# Patient Record
Sex: FEMALE | Race: WHITE | NOT HISPANIC OR LATINO | Employment: FULL TIME | URBAN - METROPOLITAN AREA
[De-identification: names, ages, dates, MRNs, and addresses within clinical notes are randomized per-mention and may not be internally consistent; named-entity substitution may affect disease eponyms.]

---

## 2019-02-05 ENCOUNTER — TRANSFERRED RECORDS (OUTPATIENT)
Dept: HEALTH INFORMATION MANAGEMENT | Facility: CLINIC | Age: 59
End: 2019-02-05

## 2020-06-03 ENCOUNTER — TRANSFERRED RECORDS (OUTPATIENT)
Dept: HEALTH INFORMATION MANAGEMENT | Facility: CLINIC | Age: 60
End: 2020-06-03

## 2022-05-09 ENCOUNTER — APPOINTMENT (OUTPATIENT)
Dept: OCCUPATIONAL MEDICINE | Facility: OTHER | Age: 62
End: 2022-05-09

## 2022-05-16 ENCOUNTER — HOSPITAL ENCOUNTER (OUTPATIENT)
Dept: GENERAL RADIOLOGY | Facility: HOSPITAL | Age: 62
Discharge: HOME | End: 2022-05-16
Attending: FAMILY MEDICINE | Admitting: FAMILY MEDICINE

## 2022-05-16 ENCOUNTER — APPOINTMENT (OUTPATIENT)
Dept: OCCUPATIONAL MEDICINE | Facility: OTHER | Age: 62
End: 2022-05-16

## 2022-05-16 DIAGNOSIS — R76.12 POSITIVE QUANTIFERON-TB GOLD TEST: ICD-10-CM

## 2022-05-16 PROCEDURE — 999N000029 XR CHEST 1 VIEW, JOB CARE

## 2022-06-22 ENCOUNTER — APPOINTMENT (OUTPATIENT)
Dept: OCCUPATIONAL MEDICINE | Facility: OTHER | Age: 62
End: 2022-06-22

## 2022-07-28 ENCOUNTER — TELEPHONE (OUTPATIENT)
Dept: FAMILY MEDICINE | Facility: OTHER | Age: 62
End: 2022-07-28

## 2022-07-28 NOTE — TELEPHONE ENCOUNTER
Pt is est care with Provider Monty.  Called to request  Med refills.  Writer instructed pt to contact pharmacy to request current orders be sent to [facility] clinic for review & Providers approval to refill  Pt relayed understanding.  No further concerns att the end of this call.

## 2022-08-02 DIAGNOSIS — I10 HTN (HYPERTENSION): ICD-10-CM

## 2022-08-02 DIAGNOSIS — I10 HYPERTENSION, UNSPECIFIED TYPE: ICD-10-CM

## 2022-08-02 DIAGNOSIS — F41.9 ANXIETY: Primary | ICD-10-CM

## 2022-08-02 NOTE — TELEPHONE ENCOUNTER
Will be establishing care with you 10/3/22.  In need of Elavil, Lisinopril, and Norvasc.  Elavil filled 6.30.33 #30, Lisinopril 7.14.22 #30, Norvasc 7.14.22 #30.

## 2022-08-03 RX ORDER — AMLODIPINE BESYLATE 10 MG/1
10 TABLET ORAL DAILY
Qty: 30 TABLET | Refills: 2 | Status: SHIPPED | OUTPATIENT
Start: 2022-08-03 | End: 2022-10-03

## 2022-08-03 RX ORDER — LISINOPRIL 40 MG/1
40 TABLET ORAL DAILY
Qty: 30 TABLET | Refills: 2 | Status: SHIPPED | OUTPATIENT
Start: 2022-08-03 | End: 2022-10-03 | Stop reason: DRUGHIGH

## 2022-08-12 DIAGNOSIS — I10 HYPERTENSION, UNSPECIFIED TYPE: ICD-10-CM

## 2022-08-12 DIAGNOSIS — F41.9 ANXIETY: ICD-10-CM

## 2022-08-16 RX ORDER — LISINOPRIL 40 MG/1
TABLET ORAL
Qty: 30 TABLET | Refills: 0 | OUTPATIENT
Start: 2022-08-16

## 2022-08-16 NOTE — TELEPHONE ENCOUNTER
Pt has establish care with Chandrika October 2022    Pharmacy request for Toprol XL and Hygroton.No on current [facility] med list.    Please advise.    Ann Marie Llanos RN

## 2022-08-17 RX ORDER — CHLORTHALIDONE 25 MG/1
TABLET ORAL
Qty: 15 TABLET | Refills: 0 | Status: SHIPPED | OUTPATIENT
Start: 2022-08-17 | End: 2022-09-16

## 2022-08-17 RX ORDER — METOPROLOL SUCCINATE 100 MG/1
TABLET, EXTENDED RELEASE ORAL
Qty: 30 TABLET | Refills: 0 | Status: SHIPPED | OUTPATIENT
Start: 2022-08-17 | End: 2022-09-16

## 2022-09-14 DIAGNOSIS — I10 HYPERTENSION, UNSPECIFIED TYPE: ICD-10-CM

## 2022-09-16 RX ORDER — CHLORTHALIDONE 25 MG/1
TABLET ORAL
Qty: 15 TABLET | Refills: 0 | Status: SHIPPED | OUTPATIENT
Start: 2022-09-16 | End: 2022-10-03

## 2022-09-16 RX ORDER — METOPROLOL SUCCINATE 100 MG/1
TABLET, EXTENDED RELEASE ORAL
Qty: 30 TABLET | Refills: 0 | Status: SHIPPED | OUTPATIENT
Start: 2022-09-16 | End: 2022-10-03

## 2022-09-16 NOTE — TELEPHONE ENCOUNTER
chlorthalidone      Last Written Prescription Date:  8/17/22  Last Fill Quantity: 15,   # refills: 0  Last Office Visit: 5/20/22  Future Office visit:       Routing refill request to provider for review/approval because:      Metoprolol succinate ER      Last Written Prescription Date:  8/17/22  Last Fill Quantity: 30,   # refills: 0  Last Office Visit: 5/20/22  Future Office visit:       Routing refill request to provider for review/approval because:

## 2022-09-30 NOTE — PROGRESS NOTES
Assessment & Plan   ESTABLISH CARE VISIT    ACCEPT INTO PRACTICE.     Visit for screening mammogram  She is due.    - MA SCREENING DIGITAL BILAT - Future  (s+30); Future    Screen for colon cancer  Due for this.   - Colonoscopy Screening  Referral; Future    Screening for HIV (human immunodeficiency virus)  She is due for this.   - HIV Antigen Antibody Combo; Future    Need for hepatitis C screening test  Never been screened.   - Hepatitis C Screen Reflex to HCV RNA Quant and Genotype; Future    Screening for hyperlipidemia  Due for fasting labs.   - Lipid panel reflex to direct LDL Non-fasting; Future    Hypertension, unspecified type  She is going to take Elavil for sleep and this does help bring her blood pressure down.  She is very diligent about blood pressure control.  DASH dietary changes are followed.  Needing a refill of all medications.  Weight loss discussion.   - amitriptyline (ELAVIL) 25 MG tablet; Take 1 tablet (25 mg) by mouth At Bedtime  - chlorthalidone (HYGROTON) 25 MG tablet; TAKE ONE-HALF TABLET (12.5MG) BY MOUTH DAILY  - metoprolol succinate ER (TOPROL XL) 100 MG 24 hr tablet; TAKE 1 TABLET BY MOUTH DAILY. DO NOT CRUSH OR CHEW.  - amLODIPine (NORVASC) 10 MG tablet; Take 1 tablet (10 mg) by mouth daily  - lisinopril (ZESTRIL) 40 MG tablet; Take 1 tablet (40 mg) by mouth daily  - CBC with platelets and differential; Future  - Basic metabolic panel; Future    Anxiety  Longstanding.     Mixed hyperlipidemia  She is going to be given refill. Needing labs. Has calcium score that needs to be done.  Had some changes on a long CT with arthrosclerosis .  This was done in [place] years ago.  We will recheck and do assessment for risk with other chronic disease.   - atorvastatin (LIPITOR) 20 MG tablet; Take 1 tablet (20 mg) by mouth At Bedtime    Pure hypercholesterolemia    - atorvastatin (LIPITOR) 20 MG tablet; Take 1 tablet (20 mg) by mouth At Bedtime    Primary insomnia  Elavil as well.   -  "hydrOXYzine (ATARAX) 25 MG tablet; Take 1 tablet (25 mg) by mouth 3 times daily as needed for anxiety    Allergic conjunctivitis, bilateral    - fluticasone (FLONASE) 50 MCG/ACT nasal spray; Spray 2 sprays in nostril daily  - olopatadine (PATANOL) 0.1 % ophthalmic solution; Place 1 drop into both eyes 2 times daily    Prediabetes    - metFORMIN (GLUCOPHAGE XR) 500 MG 24 hr tablet; Take 1 tablet (500 mg) by mouth daily (with dinner)    Gout, unspecified cause, unspecified chronicity, unspecified site  Hx of this in her big toe.   - Uric acid; Future      10 minutes spent on the date of the encounter doing chart review, history and exam, documentation and further activities per the note       BMI:   Estimated body mass index is 32.39 kg/m  as calculated from the following:    Height as of this encounter: 1.727 m (5' 8\").    Weight as of this encounter: 96.6 kg (213 lb).       See Patient Instructions    No follow-ups on file.    ISMAEL Van  [facility] - AMANDA Meneses is a 62 year old, presenting for the following health issues:  Establish Care      HPI     Hypertension Follow-up      Do you check your blood pressure regularly outside of the clinic? No     Are you following a low salt diet? Yes    Are your blood pressures ever more than 140 on the top number (systolic) OR more   than 90 on the bottom number (diastolic), for example 140/90? No    Hyperlipidemia Follow-Up      Are you regularly taking any medication or supplement to lower your cholesterol?   Yes- Lipitor    Are you having muscle aches or other side effects that you think could be caused by your cholesterol lowering medication?  No    Anxiety Follow-Up    How are you doing with your anxiety since your last visit? Improved    Are you having other symptoms that might be associated with anxiety? No    Have you had a significant life event? Relationship Concerns -  has HF    Are you feeling depressed? No    Do " "you have any concerns with your use of alcohol or other drugs? No    Social History     Tobacco Use     Smoking status: Former Smoker     Quit date: 1995     Years since quittin.7     Smokeless tobacco: Never Used   Substance Use Topics     Alcohol use: Yes     Comment: ocassion     Drug use: Never     No flowsheet data found.  No flowsheet data found.  No flowsheet data found.  No flowsheet data found.      How many servings of fruits and vegetables do you eat daily?  2-3    On average, how many sweetened beverages do you drink each day (Examples: soda, juice, sweet tea, etc.  Do NOT count diet or artificially sweetened beverages)?   0    How many days per week do you exercise enough to make your heart beat faster? 5    How many minutes a day do you exercise enough to make your heart beat faster? 30 - 60    How many days per week do you miss taking your medication? 0           Review of Systems   CONSTITUTIONAL: NEGATIVE for fever, chills, change in weight  INTEGUMENTARY/SKIN: NEGATIVE for worrisome rashes, moles or lesions  EYES: NEGATIVE for vision changes or irritation  ENT/MOUTH: NEGATIVE for ear, mouth and throat problems  RESP: NEGATIVE for significant cough or SOB  BREAST: NEGATIVE for masses, tenderness or discharge  CV: NEGATIVE for chest pain, palpitations or peripheral edema  GI: NEGATIVE for nausea, abdominal pain, heartburn, or change in bowel habits  : NEGATIVE for frequency, dysuria, or hematuria  MUSCULOSKELETAL: she has too many aches to mention.   NEURO: NEGATIVE for weakness, dizziness or paresthesias  ENDOCRINE: NEGATIVE for temperature intolerance, skin/hair changes  HEME: NEGATIVE for bleeding problems  PSYCHIATRIC: NEGATIVE for changes in mood or affect      Objective    /58 (BP Location: Right arm, Patient Position: Sitting, Cuff Size: Adult Regular)   Pulse 80   Temp 97.7  F (36.5  C) (Tympanic)   Ht 1.727 m (5' 8\")   Wt 96.6 kg (213 lb)   SpO2 96%   BMI 32.39 kg/m  "   Body mass index is 32.39 kg/m .  Physical Exam   GENERAL: healthy, alert and no distress  EYES: Eyes grossly normal to inspection, PERRL and conjunctivae and sclerae normal  HENT: ear canals and TM's normal, nose and mouth without ulcers or lesions  NECK: no adenopathy, no asymmetry, masses, or scars and thyroid normal to palpation  RESP: lungs clear to auscultation - no rales, rhonchi or wheezes  BREAST: normal without masses, tenderness or nipple discharge and no palpable axillary masses or adenopathy  CV: regular rate and rhythm, normal S1 S2, no S3 or S4, no murmur, click or rub, no peripheral edema and peripheral pulses strong  ABDOMEN: soft, nontender, no hepatosplenomegaly, no masses and bowel sounds normal   (female): normal female external genitalia, normal urethral meatus, vaginal mucosa pink, moist, well rugated, and normal cervix/adnexa/uterus without masses or discharge  MS: no gross musculoskeletal defects noted, no edema  SKIN: no suspicious lesions or rashes  NEURO: Normal strength and tone, mentation intact and speech normal  PSYCH: mentation appears normal, affect normal/bright  LYMPH: no cervical, supraclavicular, axillary, or inguinal adenopathy    No results found for any previous visit.

## 2022-10-03 ENCOUNTER — OFFICE VISIT (OUTPATIENT)
Dept: FAMILY MEDICINE | Facility: OTHER | Age: 62
End: 2022-10-03
Attending: PHYSICIAN ASSISTANT
Payer: COMMERCIAL

## 2022-10-03 VITALS
BODY MASS INDEX: 32.28 KG/M2 | HEART RATE: 80 BPM | HEIGHT: 68 IN | SYSTOLIC BLOOD PRESSURE: 102 MMHG | TEMPERATURE: 97.7 F | OXYGEN SATURATION: 96 % | WEIGHT: 213 LBS | DIASTOLIC BLOOD PRESSURE: 58 MMHG

## 2022-10-03 DIAGNOSIS — Z11.59 NEED FOR HEPATITIS C SCREENING TEST: ICD-10-CM

## 2022-10-03 DIAGNOSIS — E78.2 MIXED HYPERLIPIDEMIA: ICD-10-CM

## 2022-10-03 DIAGNOSIS — Z22.7 LATENT TUBERCULOSIS: ICD-10-CM

## 2022-10-03 DIAGNOSIS — H10.13 ALLERGIC CONJUNCTIVITIS, BILATERAL: ICD-10-CM

## 2022-10-03 DIAGNOSIS — M10.9 GOUT, UNSPECIFIED CAUSE, UNSPECIFIED CHRONICITY, UNSPECIFIED SITE: ICD-10-CM

## 2022-10-03 DIAGNOSIS — Z11.4 SCREENING FOR HIV (HUMAN IMMUNODEFICIENCY VIRUS): ICD-10-CM

## 2022-10-03 DIAGNOSIS — E78.00 PURE HYPERCHOLESTEROLEMIA: ICD-10-CM

## 2022-10-03 DIAGNOSIS — Z76.89 ESTABLISHING CARE WITH NEW DOCTOR, ENCOUNTER FOR: ICD-10-CM

## 2022-10-03 DIAGNOSIS — Z13.220 SCREENING FOR HYPERLIPIDEMIA: ICD-10-CM

## 2022-10-03 DIAGNOSIS — I10 HYPERTENSION, UNSPECIFIED TYPE: ICD-10-CM

## 2022-10-03 DIAGNOSIS — F51.01 PRIMARY INSOMNIA: ICD-10-CM

## 2022-10-03 DIAGNOSIS — Z12.31 VISIT FOR SCREENING MAMMOGRAM: ICD-10-CM

## 2022-10-03 DIAGNOSIS — F41.9 ANXIETY: Primary | ICD-10-CM

## 2022-10-03 DIAGNOSIS — R73.03 PREDIABETES: ICD-10-CM

## 2022-10-03 DIAGNOSIS — Z12.11 SCREEN FOR COLON CANCER: ICD-10-CM

## 2022-10-03 PROBLEM — G89.4: Status: ACTIVE | Noted: 2021-05-21

## 2022-10-03 PROBLEM — K58.9 IRRITABLE BOWEL SYNDROME: Status: ACTIVE | Noted: 2020-12-11

## 2022-10-03 PROBLEM — D86.0 PULMONARY SARCOIDOSIS (H): Status: ACTIVE | Noted: 2019-12-23

## 2022-10-03 PROBLEM — N60.19 DIFFUSE CYSTIC MASTOPATHY: Status: ACTIVE | Noted: 2020-12-11

## 2022-10-03 PROBLEM — D86.1 MEDIASTINAL LYMPHADENOPATHY DUE TO SARCOIDOSIS: Status: ACTIVE | Noted: 2019-11-05

## 2022-10-03 PROBLEM — M79.18: Status: ACTIVE | Noted: 2021-05-21

## 2022-10-03 PROBLEM — M54.12 CERVICAL RADICULOPATHY: Status: ACTIVE | Noted: 2021-03-08

## 2022-10-03 LAB
ALBUMIN SERPL-MCNC: 4.1 G/DL (ref 3.4–5)
ALP SERPL-CCNC: 82 U/L (ref 40–150)
ALT SERPL W P-5'-P-CCNC: 49 U/L (ref 0–50)
ANION GAP SERPL CALCULATED.3IONS-SCNC: 5 MMOL/L (ref 3–14)
AST SERPL W P-5'-P-CCNC: 32 U/L (ref 0–45)
BASOPHILS # BLD AUTO: 0.1 10E3/UL (ref 0–0.2)
BASOPHILS NFR BLD AUTO: 1 %
BILIRUB SERPL-MCNC: 0.4 MG/DL (ref 0.2–1.3)
BUN SERPL-MCNC: 21 MG/DL (ref 7–30)
CALCIUM SERPL-MCNC: 9.1 MG/DL (ref 8.5–10.1)
CHLORIDE BLD-SCNC: 102 MMOL/L (ref 94–109)
CO2 SERPL-SCNC: 30 MMOL/L (ref 20–32)
CREAT SERPL-MCNC: 0.98 MG/DL (ref 0.52–1.04)
EOSINOPHIL # BLD AUTO: 0.2 10E3/UL (ref 0–0.7)
EOSINOPHIL NFR BLD AUTO: 4 %
ERYTHROCYTE [DISTWIDTH] IN BLOOD BY AUTOMATED COUNT: 12.4 % (ref 10–15)
GFR SERPL CREATININE-BSD FRML MDRD: 65 ML/MIN/1.73M2
GLUCOSE BLD-MCNC: 108 MG/DL (ref 70–99)
HCT VFR BLD AUTO: 41.1 % (ref 35–47)
HGB BLD-MCNC: 14 G/DL (ref 11.7–15.7)
IMM GRANULOCYTES # BLD: 0 10E3/UL
IMM GRANULOCYTES NFR BLD: 0 %
LYMPHOCYTES # BLD AUTO: 2 10E3/UL (ref 0.8–5.3)
LYMPHOCYTES NFR BLD AUTO: 29 %
MCH RBC QN AUTO: 31.3 PG (ref 26.5–33)
MCHC RBC AUTO-ENTMCNC: 34.1 G/DL (ref 31.5–36.5)
MCV RBC AUTO: 92 FL (ref 78–100)
MONOCYTES # BLD AUTO: 0.6 10E3/UL (ref 0–1.3)
MONOCYTES NFR BLD AUTO: 9 %
NEUTROPHILS # BLD AUTO: 4 10E3/UL (ref 1.6–8.3)
NEUTROPHILS NFR BLD AUTO: 57 %
NRBC # BLD AUTO: 0 10E3/UL
NRBC BLD AUTO-RTO: 0 /100
PLATELET # BLD AUTO: 253 10E3/UL (ref 150–450)
POTASSIUM BLD-SCNC: 3.7 MMOL/L (ref 3.4–5.3)
PROT SERPL-MCNC: 8 G/DL (ref 6.8–8.8)
RBC # BLD AUTO: 4.47 10E6/UL (ref 3.8–5.2)
SODIUM SERPL-SCNC: 137 MMOL/L (ref 133–144)
URATE SERPL-MCNC: 8.3 MG/DL (ref 2.6–6)
WBC # BLD AUTO: 6.9 10E3/UL (ref 4–11)

## 2022-10-03 PROCEDURE — 87389 HIV-1 AG W/HIV-1&-2 AB AG IA: CPT | Performed by: PHYSICIAN ASSISTANT

## 2022-10-03 PROCEDURE — 85025 COMPLETE CBC W/AUTO DIFF WBC: CPT | Performed by: PHYSICIAN ASSISTANT

## 2022-10-03 PROCEDURE — 84550 ASSAY OF BLOOD/URIC ACID: CPT | Performed by: PHYSICIAN ASSISTANT

## 2022-10-03 PROCEDURE — 80053 COMPREHEN METABOLIC PANEL: CPT | Performed by: PHYSICIAN ASSISTANT

## 2022-10-03 PROCEDURE — 86803 HEPATITIS C AB TEST: CPT | Performed by: PHYSICIAN ASSISTANT

## 2022-10-03 PROCEDURE — 90472 IMMUNIZATION ADMIN EACH ADD: CPT | Performed by: PHYSICIAN ASSISTANT

## 2022-10-03 PROCEDURE — 36415 COLL VENOUS BLD VENIPUNCTURE: CPT | Performed by: PHYSICIAN ASSISTANT

## 2022-10-03 PROCEDURE — 90471 IMMUNIZATION ADMIN: CPT | Performed by: PHYSICIAN ASSISTANT

## 2022-10-03 PROCEDURE — 80061 LIPID PANEL: CPT | Performed by: PHYSICIAN ASSISTANT

## 2022-10-03 PROCEDURE — 90682 RIV4 VACC RECOMBINANT DNA IM: CPT | Performed by: PHYSICIAN ASSISTANT

## 2022-10-03 PROCEDURE — 90750 HZV VACC RECOMBINANT IM: CPT | Performed by: PHYSICIAN ASSISTANT

## 2022-10-03 PROCEDURE — 99204 OFFICE O/P NEW MOD 45 MIN: CPT | Mod: 25 | Performed by: PHYSICIAN ASSISTANT

## 2022-10-03 RX ORDER — OLOPATADINE HYDROCHLORIDE 1 MG/ML
1 SOLUTION/ DROPS OPHTHALMIC 2 TIMES DAILY
Qty: 5 ML | Refills: 1 | Status: SHIPPED | OUTPATIENT
Start: 2022-10-03 | End: 2023-08-28

## 2022-10-03 RX ORDER — METFORMIN HCL 500 MG
500 TABLET, EXTENDED RELEASE 24 HR ORAL
Qty: 30 TABLET | Refills: 3 | Status: SHIPPED | OUTPATIENT
Start: 2022-10-03 | End: 2022-11-10

## 2022-10-03 RX ORDER — CETIRIZINE HYDROCHLORIDE 10 MG/1
1 CAPSULE, LIQUID FILLED ORAL DAILY
COMMUNITY

## 2022-10-03 RX ORDER — CHLORTHALIDONE 25 MG/1
TABLET ORAL
Qty: 15 TABLET | Refills: 0 | Status: SHIPPED | OUTPATIENT
Start: 2022-10-03 | End: 2022-11-10

## 2022-10-03 RX ORDER — ATORVASTATIN CALCIUM 20 MG/1
20 TABLET, FILM COATED ORAL AT BEDTIME
Qty: 90 TABLET | Refills: 1 | Status: SHIPPED | OUTPATIENT
Start: 2022-10-03 | End: 2022-11-10

## 2022-10-03 RX ORDER — METOPROLOL SUCCINATE 100 MG/1
TABLET, EXTENDED RELEASE ORAL
Qty: 30 TABLET | Refills: 0 | Status: SHIPPED | OUTPATIENT
Start: 2022-10-03 | End: 2022-11-10

## 2022-10-03 RX ORDER — ATORVASTATIN CALCIUM 20 MG/1
1 TABLET, FILM COATED ORAL AT BEDTIME
COMMUNITY
Start: 2022-06-01 | End: 2022-10-03

## 2022-10-03 RX ORDER — HYDROXYZINE HYDROCHLORIDE 25 MG/1
25 TABLET, FILM COATED ORAL 3 TIMES DAILY PRN
COMMUNITY
End: 2022-10-03

## 2022-10-03 RX ORDER — OLOPATADINE HYDROCHLORIDE 1 MG/ML
1 SOLUTION/ DROPS OPHTHALMIC 2 TIMES DAILY
COMMUNITY
End: 2022-10-03

## 2022-10-03 RX ORDER — AMLODIPINE BESYLATE 10 MG/1
10 TABLET ORAL DAILY
Qty: 30 TABLET | Refills: 2 | Status: SHIPPED | OUTPATIENT
Start: 2022-10-03 | End: 2022-11-10

## 2022-10-03 RX ORDER — FLUTICASONE PROPIONATE 50 MCG
2 SPRAY, SUSPENSION (ML) NASAL
COMMUNITY
Start: 2022-05-20 | End: 2022-10-03

## 2022-10-03 RX ORDER — LISINOPRIL 40 MG/1
40 TABLET ORAL
COMMUNITY
Start: 2022-08-03 | End: 2022-10-03

## 2022-10-03 RX ORDER — LISINOPRIL 40 MG/1
40 TABLET ORAL DAILY
Qty: 90 TABLET | Refills: 1 | Status: SHIPPED | OUTPATIENT
Start: 2022-10-03 | End: 2022-11-10

## 2022-10-03 RX ORDER — HYDROXYZINE HYDROCHLORIDE 25 MG/1
25 TABLET, FILM COATED ORAL 3 TIMES DAILY PRN
Qty: 30 TABLET | Refills: 3 | Status: SHIPPED | OUTPATIENT
Start: 2022-10-03 | End: 2022-11-10

## 2022-10-03 RX ORDER — FLUTICASONE PROPIONATE 50 MCG
2 SPRAY, SUSPENSION (ML) NASAL DAILY
Qty: 18.2 ML | Refills: 11 | Status: SHIPPED | OUTPATIENT
Start: 2022-10-03 | End: 2023-08-28

## 2022-10-03 ASSESSMENT — PAIN SCALES - GENERAL: PAINLEVEL: NO PAIN (0)

## 2022-10-03 NOTE — PATIENT INSTRUCTIONS
Thank you for choosing [facility].   I have office hours 8:00 am to 4:30 pm on Monday's, Wednesday's, Thursday's and Friday's. My nurse and I are out of the office every Tuesday.    Following your visit, when your labs and diagnostic testing have returned, I will review then and you will be contacted by my nurse.  If you are on My Chart, you can also view results there.    For refills, notify your pharmacy regarding what you need and the pharmacy will generate a refill request. Do not call my nurse as she is unable to process refill request. Please plan ahead and allow 3-5 days for refill requests.    You will generally receive a reminder call the day prior to your appointment.  If you cannot attend your appointment, please cancel your appointment with as much notice as possible.  If there is a pattern of failure to present for your appointments, I cannot provide consistent, meaningful, ongoing care for you. It is very important to me that you come in for your care, so we can best assist you with your health care needs.    IMPORTANT:  Please note that it is my standard of practice to NOT participate in prescribing ongoing requested Narcotic Analgesic therapy, and/or participate in the prescribing of other controlled substances.  My nurse and I am happy to assist you with the process of referral for alternative pain management as needed, and other treatment modalities including but not limited to:  Physical Therapy, Physical Medicine and Rehab, Counseling, Chiropractic Care, Orthopedic Care, and non-narcotic medication management.     In the event that you need to be seen for emergent concerns and I am out of office,  please see one of my colleagues for acute concerns.  You may also present to  or ER.  I appreciate the opportunity to serve you and look forward to supporting your healthcare needs in the future. Please contact me with any questions or concerns that you may  have.    Sincerely,      Yecenia Millan RN, PA-C

## 2022-10-03 NOTE — NURSING NOTE
"Chief Complaint   Patient presents with     Establish Care       Initial /58 (BP Location: Right arm, Patient Position: Sitting, Cuff Size: Adult Regular)   Pulse 80   Temp 97.7  F (36.5  C) (Tympanic)   Ht 1.727 m (5' 8\")   Wt 96.6 kg (213 lb)   SpO2 96%   BMI 32.39 kg/m   Estimated body mass index is 32.39 kg/m  as calculated from the following:    Height as of this encounter: 1.727 m (5' 8\").    Weight as of this encounter: 96.6 kg (213 lb).  Medication Reconciliation: complete  Lorie Maloney LPN  "

## 2022-10-04 LAB
CHOLEST SERPL-MCNC: 189 MG/DL
HDLC SERPL-MCNC: 52 MG/DL
LDLC SERPL CALC-MCNC: 86 MG/DL
NONHDLC SERPL-MCNC: 137 MG/DL
TRIGL SERPL-MCNC: 257 MG/DL

## 2022-10-05 ENCOUNTER — E-CONSULT (OUTPATIENT)
Dept: INFECTIOUS DISEASES | Facility: CLINIC | Age: 62
End: 2022-10-05
Payer: COMMERCIAL

## 2022-10-05 DIAGNOSIS — Z22.7 LATENT TUBERCULOSIS INFECTION: Primary | ICD-10-CM

## 2022-10-05 LAB
HCV AB SERPL QL IA: NONREACTIVE
HIV 1+2 AB+HIV1 P24 AG SERPL QL IA: NONREACTIVE

## 2022-10-05 PROCEDURE — 99451 NTRPROF PH1/NTRNET/EHR 5/>: CPT | Performed by: INTERNAL MEDICINE

## 2022-10-05 NOTE — RESULT ENCOUNTER NOTE
LDL and HDL are good but the TGL are higher than we like.   Could be a glass of wine the night before. Ok uric acid is too high will be hard on your kidneys. Can start taking Uloric or Allopurinol.  I do not see this on your list of medications.  If you have this and currently not taking let me know.    And your glucose is prediabetic. So start on the Metformin.

## 2022-10-06 NOTE — PROGRESS NOTES
10/5/2022     E-Consult has been accepted.    Interprofessional consultation requested by:  Yecenia Millan PA      Clinical Question/Purpose:  If treatment of latent TB is needed?     Patient assessment and information reviewed:   There us a note that she had positive TB quantiferon 5/12/22 but I can't find the result. 5/16/22 CXR clear. PMH includes HTN, anxiety, mixed hyperlipidemia, prediabetes and gout.     Recommendations:   The decision to treat latent TB involves risk/benefit conversations with the patient regarding the medication side effect and risk for reactivation.  If latent TB is untreated, approximately 5%-10% of persons with LTBI progress to tuberculosis (TB) disease during their lifetime.   This is a calculator to determine % chance of reactivation. Https://www.txrbo2p.[employer]/en/calc.html.   This takes into account where she was born, BCG status, known contact, health history. Based on my review her risk of reactivation is relatively low. This would need to be considered when comparing it to the risk of side effects of the medication.   Immunosuppression would increase the risk of reactivation. If there are plans for increased immunosuppression for any reason then you would want to treat.  Asking your patient why she was tested (? Occupational test) and her risk factors for TB is important to determine the validity of the test. If there are no risk factors you may want to consider repeating the TB quantiferon. I can't find the result so you may want to reconsider checking it for this reason as well if you don't have access to result. Another consideration is if there is ongoing travel to countries with high rates of TB the decision to not treat may be based on the risk of re-exposure.  Higher risk factors for reactivation include HIV, transplant, lymphoma, renal failure, TNF alpha blockers; intermediate risk factors are diabetes, prolonged steroids; lower risk smoking tobacco, granuloma on CXR,  and individuals born in countries with high incidence of of TB. HIV negative.  If the decision is made to treat after conversation with the patient then the optimal regimen options include (in order):   1. Weekly Rifapentine 900 mg (>50 kg) + weekly Isoniazid (15 mg/kg, max 900 mg) + pyridoxine 50 mg daily. Duration is 12 weeks. Please check for drug drug interactions.  2. Rifampin daily (10 mg/kg, max 600 mg) x 4 months   3. If further alternate regimens are considered then may want to consider referral to ID.     You may find this reference helpful when discussing treatment side effects. Https://www.cdc.gov/tb/publications/ltbi/pdf/QNZUzcjsxsr830.pdf.    LFTs are normal at baseline but CBC with diff and CMP should be checked monthly while on treatment.     The recommendations provided in this E-Consult are based on a review of clinical data pertinent to the clinical question presented, without a review of the patient's complete medical record or, the benefit of a comprehensive in-person or virtual patient evaluation. This consultation should not replace the clinical judgement and evaluation of the provider ordering this E-Consult. Any new clinical issues, or changes in patient status since the filing of this E-Consult will need to be taken into account when assessing these recommendations. Please contact me if you have further questions.    My total time spent reviewing clinical information and formulating assessment was 20 minutes.        Dionna Dhillon DO

## 2022-10-28 ENCOUNTER — HOSPITAL ENCOUNTER (OUTPATIENT)
Facility: HOSPITAL | Age: 62
End: 2022-10-28
Attending: SURGERY | Admitting: SURGERY
Payer: COMMERCIAL

## 2022-10-28 ENCOUNTER — VIRTUAL VISIT (OUTPATIENT)
Dept: SURGERY | Facility: OTHER | Age: 62
End: 2022-10-28
Attending: PHYSICIAN ASSISTANT
Payer: COMMERCIAL

## 2022-10-28 ENCOUNTER — TELEPHONE (OUTPATIENT)
Dept: SURGERY | Facility: OTHER | Age: 62
End: 2022-10-28

## 2022-10-28 ENCOUNTER — PREP FOR PROCEDURE (OUTPATIENT)
Dept: SURGERY | Facility: OTHER | Age: 62
End: 2022-10-28

## 2022-10-28 DIAGNOSIS — Z12.11 SCREENING FOR COLON CANCER: Primary | ICD-10-CM

## 2022-10-28 DIAGNOSIS — Z12.11 SCREEN FOR COLON CANCER: ICD-10-CM

## 2022-10-28 RX ORDER — BISACODYL 5 MG/1
TABLET, DELAYED RELEASE ORAL
Qty: 4 TABLET | Refills: 0 | Status: SHIPPED | OUTPATIENT
Start: 2022-10-28 | End: 2022-12-14

## 2022-10-28 NOTE — PATIENT INSTRUCTIONS
Thank you for allowing our surgical team to participate in your care. Please call our health unit coordinator at [phone] with scheduling questions or the nurse at [phone] with any other questions or concerns.        You have been scheduled for Colonoscopy with  on 12/16/22.   You will use [facility] bowel prep.  Please see handout for additional instruction.  You don't need a pre-operative appointment with your primary care provider.  You may call [phone] or [phone] with any questions.     COVID-19 test is needed prior to procedure, even if you've been vaccinated.   If you are going home on the same day as your procedure (surgery):    1-2 days before your procedure, take an at-home, rapid antigen test. You can buy these at many stores. If you can't find an at-home antigen test, please schedule a PCR test with [facility] by calling [phone], or visiting [email or web address]. We can't accept tests that are more than 4 days old.    If your test is NEGATIVE, please take a photo of the test. Show the photo to the nurse when you come in for your procedure (surgery)    If your test is POSITIVE, please contact the pre-Admission office at [phone]. If you are calling after 5 PM on weekdays, and on the weekend, please call [phone] and ask to speak with the House Supervisor.   If you are staying overnight in the hospital you will need to get a PCR covid test 2-4 days prior to procedure (surgery).

## 2022-10-28 NOTE — PROGRESS NOTES
Referral received for colonoscopy for screening for colon cancer.   This patient was approved by Angeli, surgery education RN for meet and miguelinaet colonoscopy without preop appointment.   Patient scheduled for colonoscopy on 12/16/22 with Dr. Lr at [facility]   Instructions given via phone and sent to patient via mail, upper registration desk.   COVID-19 test: at home  Preop: not needed  Sangeetha Gold LPN

## 2022-11-09 ENCOUNTER — HOSPITAL ENCOUNTER (OUTPATIENT)
Dept: CT IMAGING | Facility: HOSPITAL | Age: 62
Discharge: HOME | End: 2022-11-09
Attending: PHYSICIAN ASSISTANT
Payer: COMMERCIAL

## 2022-11-09 ENCOUNTER — TELEPHONE (OUTPATIENT)
Dept: MAMMOGRAPHY | Facility: OTHER | Age: 62
End: 2022-11-09

## 2022-11-09 ENCOUNTER — ANCILLARY PROCEDURE (OUTPATIENT)
Dept: MAMMOGRAPHY | Facility: OTHER | Age: 62
End: 2022-11-09
Attending: PHYSICIAN ASSISTANT
Payer: COMMERCIAL

## 2022-11-09 DIAGNOSIS — Z12.31 VISIT FOR SCREENING MAMMOGRAM: ICD-10-CM

## 2022-11-09 DIAGNOSIS — E78.2 MIXED HYPERLIPIDEMIA: ICD-10-CM

## 2022-11-09 PROCEDURE — 75571 CT HRT W/O DYE W/CA TEST: CPT

## 2022-11-09 PROCEDURE — 77063 BREAST TOMOSYNTHESIS BI: CPT | Mod: TC | Performed by: RADIOLOGY

## 2022-11-09 PROCEDURE — 77067 SCR MAMMO BI INCL CAD: CPT | Mod: TC | Performed by: RADIOLOGY

## 2022-11-09 NOTE — TELEPHONE ENCOUNTER
Attempted to call with results, straight to voicemail and unable to leave voicemail at this time. RC

## 2022-11-09 NOTE — RESULT ENCOUNTER NOTE
Aggressive weight , cholesterol and glucose and blood pressure management due to moderate risk on her coronary calcium score.   She has an appointment in December.  Recommend she look up sources to help her with her improved exercise and diet.   Return to Work letter prepped per Dr. Trejo's verbal request, approved by Dr. Trejo, and given to patient.

## 2022-11-10 DIAGNOSIS — R73.03 PREDIABETES: ICD-10-CM

## 2022-11-10 DIAGNOSIS — E78.2 MIXED HYPERLIPIDEMIA: ICD-10-CM

## 2022-11-10 DIAGNOSIS — I10 HYPERTENSION, UNSPECIFIED TYPE: ICD-10-CM

## 2022-11-10 DIAGNOSIS — F51.01 PRIMARY INSOMNIA: ICD-10-CM

## 2022-11-10 DIAGNOSIS — E78.00 PURE HYPERCHOLESTEROLEMIA: ICD-10-CM

## 2022-11-10 RX ORDER — METOPROLOL SUCCINATE 100 MG/1
TABLET, EXTENDED RELEASE ORAL
Qty: 30 TABLET | Refills: 0 | Status: SHIPPED | OUTPATIENT
Start: 2022-11-10 | End: 2022-12-08

## 2022-11-10 RX ORDER — CHLORTHALIDONE 25 MG/1
TABLET ORAL
Qty: 15 TABLET | Refills: 0 | Status: SHIPPED | OUTPATIENT
Start: 2022-11-10 | End: 2022-12-08

## 2022-11-10 RX ORDER — HYDROXYZINE HYDROCHLORIDE 25 MG/1
25 TABLET, FILM COATED ORAL 3 TIMES DAILY PRN
Qty: 30 TABLET | Refills: 3 | Status: SHIPPED | OUTPATIENT
Start: 2022-11-10 | End: 2023-12-22

## 2022-11-10 RX ORDER — ATORVASTATIN CALCIUM 20 MG/1
20 TABLET, FILM COATED ORAL AT BEDTIME
Qty: 90 TABLET | Refills: 1 | Status: SHIPPED | OUTPATIENT
Start: 2022-11-10 | End: 2023-02-01

## 2022-11-10 RX ORDER — AMLODIPINE BESYLATE 10 MG/1
10 TABLET ORAL DAILY
Qty: 30 TABLET | Refills: 2 | Status: SHIPPED | OUTPATIENT
Start: 2022-11-10 | End: 2023-03-01

## 2022-11-10 RX ORDER — LISINOPRIL 40 MG/1
40 TABLET ORAL DAILY
Qty: 90 TABLET | Refills: 1 | Status: SHIPPED | OUTPATIENT
Start: 2022-11-10 | End: 2023-08-28

## 2022-11-10 RX ORDER — METFORMIN HCL 500 MG
500 TABLET, EXTENDED RELEASE 24 HR ORAL
Qty: 30 TABLET | Refills: 3 | Status: SHIPPED | OUTPATIENT
Start: 2022-11-10 | End: 2022-12-14

## 2022-11-18 ENCOUNTER — APPOINTMENT (OUTPATIENT)
Dept: OCCUPATIONAL MEDICINE | Facility: OTHER | Age: 62
End: 2022-11-18

## 2022-11-28 ENCOUNTER — OFFICE VISIT (OUTPATIENT)
Dept: FAMILY MEDICINE | Facility: OTHER | Age: 62
End: 2022-11-28
Attending: NURSE PRACTITIONER
Payer: COMMERCIAL

## 2022-11-28 VITALS
DIASTOLIC BLOOD PRESSURE: 70 MMHG | TEMPERATURE: 98.3 F | HEART RATE: 94 BPM | OXYGEN SATURATION: 97 % | WEIGHT: 210 LBS | SYSTOLIC BLOOD PRESSURE: 120 MMHG | BODY MASS INDEX: 31.93 KG/M2

## 2022-11-28 DIAGNOSIS — J06.9 VIRAL URI WITH COUGH: Primary | ICD-10-CM

## 2022-11-28 LAB
FLUAV RNA SPEC QL NAA+PROBE: NEGATIVE
FLUBV RNA RESP QL NAA+PROBE: NEGATIVE
GROUP A STREP BY PCR: NOT DETECTED
RSV RNA SPEC NAA+PROBE: NEGATIVE
SARS-COV-2 RNA RESP QL NAA+PROBE: NEGATIVE

## 2022-11-28 PROCEDURE — 87637 SARSCOV2&INF A&B&RSV AMP PRB: CPT | Performed by: NURSE PRACTITIONER

## 2022-11-28 PROCEDURE — 99213 OFFICE O/P EST LOW 20 MIN: CPT | Performed by: NURSE PRACTITIONER

## 2022-11-28 PROCEDURE — 87651 STREP A DNA AMP PROBE: CPT | Performed by: NURSE PRACTITIONER

## 2022-11-28 ASSESSMENT — PAIN SCALES - GENERAL: PAINLEVEL: MODERATE PAIN (5)

## 2022-11-28 NOTE — PROGRESS NOTES
Assessment & Plan     (J06.9) Viral URI with cough  (primary encounter diagnosis)  Comment: She has had viral URI symptoms for 10 days. Likely lingering viral illness, but will test for strep, influenza, and Covid and call patient with results. She was advised to rest, hydrate, and call office if any symptoms worsen, new fever, or with any difficulty staying hydrated.   Plan: Group A Streptococcus PCR Throat Swab ([place]         ONLY), Symptomatic; Unknown Influenza A/B &         SARS-CoV2 (COVID-19) Virus PCR Multiplex          Negative Strep   COVID/influenza and RSV pending    Ordering of each unique test         See Patient Instructions    No follow-ups on file.    JAMIE Granados  [facility]      I was present with the nurse practitioner student who participated in the service and in the documentation of the note. I have verified the history and personally performed the physical exam and medical decision making. I agree with the assessment and plan of care as documented in the note.     Evy CARLIN [facility]  Family Nurse Practitioner          Saritha Meneses is a 62 year old accompanied by her self, presenting for the following health issues:  Cough      HPI     Acute Illness  Acute illness concerns: cough sore throat  Onset/Duration: sore throat for over 10 days cough for couple days  Symptoms:  Fever: No  Chills/Sweats: YES  Headache (location?): YES  Sinus Pressure: No  Conjunctivitis:  No  Ear Pain: YES: Right ear   Rhinorrhea: No  Congestion: No  Sore Throat: YES  Cough: YES-non-productive  Wheeze: No  Decreased Appetite: yes  Nausea: No  Vomiting: No  Diarrhea: No  Dysuria/Freq.: No  Dysuria or Hematuria: No  Fatigue/Achiness: No  Sick/Strep Exposure: No  Therapies tried and outcome: None, ibuprofen this morning. No cough or flu medications.       Review of Systems   CONSTITUTIONAL:Hot and cold feeling. Not sleeping well due to symptoms  ENT/MOUTH: Sore throat for  10 days. Pain in right ear. No congestion, post nasal drip or difficulty swallowing.   RESP:NEGATIVE for hemoptysis, SOB/dyspnea and wheezing and POSITIVE for  cough-non productive  CV: NEGATIVE for chest pain, palpitations or peripheral edema  GI: POSITIVE for 2-3 days of abdominal pain generalized and poor appetite and diarrhea and NEGATIVE for nausea and vomiting      Objective    /70   Pulse 94   Temp 98.3  F (36.8  C) (Tympanic)   Wt 95.3 kg (210 lb)   SpO2 97%   BMI 31.93 kg/m    Body mass index is 31.93 kg/m .  Physical Exam   GENERAL: alert and no distress, appears tired.   EYES: Eyes grossly normal to inspection, PERRL and conjunctivae and sclerae normal  HENT: ear canals and TM's normal, nose and mouth without ulcers or lesions. Tonsils have erythema and swelling bilaterally.   NECK: no adenopathy, no asymmetry, masses, or scars.  RESP: lungs clear to auscultation - no rales, rhonchi or wheezes  CV: regular rate and rhythm, normal S1 S2, no S3 or S4, no murmur, click or rub  ABDOMEN: soft, nontender, no hepatosplenomegaly, no masses and bowel sounds normal  SKIN: no suspicious lesions or rashes    Results for orders placed or performed in visit on 11/28/22   Group A Streptococcus PCR Throat Swab ([place] ONLY)     Status: Normal    Specimen: Throat; Swab   Result Value Ref Range    Group A strep by PCR Not Detected Not Detected    Narrative    The Xpert Xpress Strep A test, performed on the [employer]ument Systems, is a rapid, qualitative in vitro diagnostic test for the detection of Streptococcus pyogenes (Group A ß-hemolytic Streptococcus, Strep A) in throat swab specimens from patients with signs and symptoms of pharyngitis. The Xpert Xpress Strep A test can be used as an aid in the diagnosis of Group A Streptococcal pharyngitis. The assay is not intended to monitor treatment for Group A Streptococcus infections. The Xpert Xpress Strep A test utilizes an automated real-time polymerase  chain reaction (PCR) to detect Streptococcus pyogenes DNA.

## 2022-11-29 ENCOUNTER — TELEPHONE (OUTPATIENT)
Dept: FAMILY MEDICINE | Facility: OTHER | Age: 62
End: 2022-11-29

## 2022-11-29 NOTE — TELEPHONE ENCOUNTER
----- Message from TESFAYE Amos CNP sent at 11/29/2022  7:05 AM CST -----  Negative for strep, influenza, RSV and COVID

## 2022-12-08 ENCOUNTER — MYC REFILL (OUTPATIENT)
Dept: FAMILY MEDICINE | Facility: OTHER | Age: 62
End: 2022-12-08

## 2022-12-08 DIAGNOSIS — I10 HYPERTENSION, UNSPECIFIED TYPE: ICD-10-CM

## 2022-12-08 RX ORDER — SODIUM CHLORIDE, SODIUM LACTATE, POTASSIUM CHLORIDE, CALCIUM CHLORIDE 600; 310; 30; 20 MG/100ML; MG/100ML; MG/100ML; MG/100ML
INJECTION, SOLUTION INTRAVENOUS CONTINUOUS
Status: CANCELLED | OUTPATIENT
Start: 2022-12-08

## 2022-12-08 RX ORDER — METOPROLOL SUCCINATE 100 MG/1
TABLET, EXTENDED RELEASE ORAL
Qty: 30 TABLET | Refills: 3 | Status: SHIPPED | OUTPATIENT
Start: 2022-12-08 | End: 2023-02-10

## 2022-12-08 RX ORDER — MEPERIDINE HYDROCHLORIDE 25 MG/ML
12.5 INJECTION INTRAMUSCULAR; INTRAVENOUS; SUBCUTANEOUS
Status: CANCELLED | OUTPATIENT
Start: 2022-12-08

## 2022-12-08 RX ORDER — CHLORTHALIDONE 25 MG/1
TABLET ORAL
Qty: 15 TABLET | Refills: 0 | Status: SHIPPED | OUTPATIENT
Start: 2022-12-08 | End: 2023-02-10

## 2022-12-08 RX ORDER — HYDRALAZINE HYDROCHLORIDE 20 MG/ML
5-10 INJECTION INTRAMUSCULAR; INTRAVENOUS EVERY 10 MIN PRN
Status: CANCELLED | OUTPATIENT
Start: 2022-12-08

## 2022-12-08 RX ORDER — FENTANYL CITRATE 50 UG/ML
25 INJECTION, SOLUTION INTRAMUSCULAR; INTRAVENOUS
Status: CANCELLED | OUTPATIENT
Start: 2022-12-08

## 2022-12-08 RX ORDER — CHLORTHALIDONE 25 MG/1
TABLET ORAL
Qty: 15 TABLET | Refills: 0 | Status: SHIPPED | OUTPATIENT
Start: 2022-12-08 | End: 2022-12-14

## 2022-12-08 RX ORDER — ONDANSETRON 4 MG/1
4 TABLET, ORALLY DISINTEGRATING ORAL EVERY 30 MIN PRN
Status: CANCELLED | OUTPATIENT
Start: 2022-12-08

## 2022-12-08 RX ORDER — HYDROMORPHONE HYDROCHLORIDE 1 MG/ML
0.2 INJECTION, SOLUTION INTRAMUSCULAR; INTRAVENOUS; SUBCUTANEOUS EVERY 5 MIN PRN
Status: CANCELLED | OUTPATIENT
Start: 2022-12-08

## 2022-12-08 RX ORDER — FENTANYL CITRATE 50 UG/ML
50 INJECTION, SOLUTION INTRAMUSCULAR; INTRAVENOUS EVERY 5 MIN PRN
Status: CANCELLED | OUTPATIENT
Start: 2022-12-08

## 2022-12-08 RX ORDER — ONDANSETRON 2 MG/ML
4 INJECTION INTRAMUSCULAR; INTRAVENOUS EVERY 30 MIN PRN
Status: CANCELLED | OUTPATIENT
Start: 2022-12-08

## 2022-12-08 RX ORDER — LIDOCAINE 40 MG/G
CREAM TOPICAL
Status: CANCELLED | OUTPATIENT
Start: 2022-12-08

## 2022-12-08 RX ORDER — METOPROLOL SUCCINATE 100 MG/1
TABLET, EXTENDED RELEASE ORAL
Qty: 30 TABLET | Refills: 0 | Status: SHIPPED | OUTPATIENT
Start: 2022-12-08 | End: 2022-12-14

## 2022-12-08 RX ORDER — FENTANYL CITRATE 50 UG/ML
25 INJECTION, SOLUTION INTRAMUSCULAR; INTRAVENOUS EVERY 5 MIN PRN
Status: CANCELLED | OUTPATIENT
Start: 2022-12-08

## 2022-12-08 RX ORDER — HYDROMORPHONE HYDROCHLORIDE 1 MG/ML
0.4 INJECTION, SOLUTION INTRAMUSCULAR; INTRAVENOUS; SUBCUTANEOUS EVERY 5 MIN PRN
Status: CANCELLED | OUTPATIENT
Start: 2022-12-08

## 2022-12-08 RX ORDER — HALOPERIDOL 5 MG/ML
0.5 INJECTION INTRAMUSCULAR
Status: CANCELLED | OUTPATIENT
Start: 2022-12-08

## 2022-12-08 NOTE — TELEPHONE ENCOUNTER
chlorthalidone      Last Written Prescription Date:  11/10/22  Last Fill Quantity: 15,   # refills: 0  Last Office Visit: 11/28/22  Future Office visit:    Next 5 appointments (look out 90 days)    Dec 14, 2022  3:30 PM  (Arrive by 3:15 PM)  SHORT with ISMAEL Jeffries  [facility] [place] ([facility] [place] ) [address]  [place] [address]  [phone]           Routing refill request to provider for review/approval because:

## 2022-12-08 NOTE — TELEPHONE ENCOUNTER
chlorthalidone      Last Written Prescription Date:  11/10/22  Last Fill Quantity: 15,   # refills: 0  Last Office Visit: 11/28/22  Future Office visit:    Next 5 appointments (look out 90 days)    Dec 14, 2022  3:30 PM  (Arrive by 3:15 PM)  SHORT with ISMAEL Jeffries  [facility] [place] ([facility] [place] ) [address]  [place] [address]  [phone]           Routing refill request to provider for review/approval because:    Metoprolol succinate ER      Last Written Prescription Date:  11/10/22  Last Fill Quantity: 30,   # refills: 0  Last Office Visit: 11/28/22  Future Office visit:    Next 5 appointments (look out 90 days)    Dec 14, 2022  3:30 PM  (Arrive by 3:15 PM)  SHORT with ISMAEL Jeffries  [facility] [place] ([facility] [place] ) [address]  [place] [address]  [phone]           Routing refill request to provider for review/approval because:

## 2022-12-14 ENCOUNTER — OFFICE VISIT (OUTPATIENT)
Dept: FAMILY MEDICINE | Facility: OTHER | Age: 62
End: 2022-12-14
Attending: PHYSICIAN ASSISTANT
Payer: COMMERCIAL

## 2022-12-14 VITALS
SYSTOLIC BLOOD PRESSURE: 98 MMHG | DIASTOLIC BLOOD PRESSURE: 60 MMHG | TEMPERATURE: 98.7 F | OXYGEN SATURATION: 96 % | HEART RATE: 70 BPM | RESPIRATION RATE: 16 BRPM | WEIGHT: 210 LBS | BODY MASS INDEX: 31.93 KG/M2

## 2022-12-14 DIAGNOSIS — I10 ESSENTIAL HYPERTENSION: ICD-10-CM

## 2022-12-14 DIAGNOSIS — R73.03 PREDIABETES: Primary | ICD-10-CM

## 2022-12-14 DIAGNOSIS — Z23 ENCOUNTER FOR IMMUNIZATION: ICD-10-CM

## 2022-12-14 DIAGNOSIS — E78.2 MIXED HYPERLIPIDEMIA: ICD-10-CM

## 2022-12-14 DIAGNOSIS — M1A.09X0 IDIOPATHIC CHRONIC GOUT OF MULTIPLE SITES WITHOUT TOPHUS: ICD-10-CM

## 2022-12-14 PROCEDURE — 90471 IMMUNIZATION ADMIN: CPT | Performed by: PHYSICIAN ASSISTANT

## 2022-12-14 PROCEDURE — 99214 OFFICE O/P EST MOD 30 MIN: CPT | Mod: 25 | Performed by: PHYSICIAN ASSISTANT

## 2022-12-14 PROCEDURE — 90750 HZV VACC RECOMBINANT IM: CPT | Performed by: PHYSICIAN ASSISTANT

## 2022-12-14 RX ORDER — METFORMIN HCL 500 MG
500 TABLET, EXTENDED RELEASE 24 HR ORAL 2 TIMES DAILY WITH MEALS
Qty: 60 TABLET | Refills: 3 | Status: SHIPPED | OUTPATIENT
Start: 2022-12-14 | End: 2024-01-16

## 2022-12-14 RX ORDER — ALLOPURINOL 100 MG/1
100 TABLET ORAL DAILY
Qty: 90 TABLET | Refills: 3 | Status: SHIPPED | OUTPATIENT
Start: 2022-12-14 | End: 2023-08-28

## 2022-12-14 ASSESSMENT — PAIN SCALES - GENERAL: PAINLEVEL: NO PAIN (0)

## 2022-12-14 NOTE — PROGRESS NOTES
Assessment & Plan     Prediabetes  Doing well on metformin. Increased dose.   - metFORMIN (GLUCOPHAGE XR) 500 MG 24 hr tablet; Take 1 tablet (500 mg) by mouth 2 times daily (with meals)    Essential hypertension  Blood pressure was good today. Working on low salt diet and increasing physical activity     Mixed hyperlipidemia  Tolerating statin. Increasing physical activity.    Idiopathic chronic gout of multiple sites without tophus  Had elevated uric acid on last labs. Going to start allopurinol  - allopurinol (ZYLOPRIM) 100 MG tablet; Take 1 tablet (100 mg) by mouth daily    Encounter for immunization  Received today.   - ZOSTER VACCINE RECOMBINANT ADJUVANTED IM NJX    Ordering of each unique test  10 minutes spent on the date of the encounter doing chart review, history and exam, documentation and further activities per the note       See Patient Instructions    No follow-ups on file.  ISMAEL Granados-S  [facility]   I saw and examined this patient.  I have read through the note and made appropriate changes and agree with the assessment and plan.     ISMAEL Van  [facility] - AMANDA Meneses is a 62 year old, presenting for the following health issues:  Follow Up      HPI     Medication Followup of metformin     Taking Medication as prescribed: yes    Side Effects:  None    Medication Helping Symptoms:  Not sure     Tolerating the metformin well, would like to discuss increasing the dose.     Increasing physical activity and watching diet, really motivated to lower cardiac risks.         Review of Systems   CONSTITUTIONAL:NEGATIVE for fever, chills, change in weight  EYES: vision changes going to see eye doctor, has had floaters in left eye   ENT/MOUTH: NEGATIVE for ear, mouth and throat problems  RESP:NEGATIVE for significant cough POSTIVE SOB with exertion, going up flight of stairs  CV: NEGATIVE for chest pain, palpitations or peripheral edema  GI:  NEGATIVE for nausea, abdominal pain, heartburn, or change in bowel habits  ENDOCRINE: NEGATIVE for temperature intolerance, skin/hair changes  SKIN: two skin lesions on back on bra line      Objective    BP 98/60 (BP Location: Right arm, Patient Position: Sitting, Cuff Size: Adult Large)   Pulse 70   Temp 98.7  F (37.1  C) (Tympanic)   Resp 16   Wt 95.3 kg (210 lb)   SpO2 96%   BMI 31.93 kg/m    Body mass index is 31.93 kg/m .  Physical Exam   GENERAL: healthy, alert and no distress  HENT: ear canals and TM's normal, nose and mouth without ulcers or lesions  NECK: no adenopathy, no asymmetry, masses, or scars and thyroid normal to palpation  RESP: lungs clear to auscultation - no rales, rhonchi or wheezes  CV: regular rate and rhythm, normal S1 S2, no S3 or S4, no murmur, click or rub, no peripheral edema and peripheral pulses strong  ABDOMEN: soft, nontender, no hepatosplenomegaly, no masses and bowel sounds normal  MS: no gross musculoskeletal defects noted, no edema  SKIN: two lesions on back, benign in appearance   NEURO: Normal strength and tone, mentation intact and speech normal  PSYCH: mentation appears normal, affect normal/bright

## 2023-02-01 ENCOUNTER — MYC MEDICAL ADVICE (OUTPATIENT)
Dept: FAMILY MEDICINE | Facility: OTHER | Age: 63
End: 2023-02-01

## 2023-02-01 DIAGNOSIS — E78.00 PURE HYPERCHOLESTEROLEMIA: ICD-10-CM

## 2023-02-01 DIAGNOSIS — I10 HYPERTENSION, UNSPECIFIED TYPE: ICD-10-CM

## 2023-02-01 DIAGNOSIS — E78.2 MIXED HYPERLIPIDEMIA: ICD-10-CM

## 2023-02-01 RX ORDER — ATORVASTATIN CALCIUM 20 MG/1
20 TABLET, FILM COATED ORAL AT BEDTIME
Qty: 90 TABLET | Refills: 1 | Status: SHIPPED | OUTPATIENT
Start: 2023-02-01 | End: 2023-08-28

## 2023-02-07 DIAGNOSIS — I10 HYPERTENSION, UNSPECIFIED TYPE: ICD-10-CM

## 2023-02-10 RX ORDER — CHLORTHALIDONE 25 MG/1
TABLET ORAL
Qty: 15 TABLET | Refills: 9 | Status: SHIPPED | OUTPATIENT
Start: 2023-02-10 | End: 2023-08-17

## 2023-02-10 RX ORDER — METOPROLOL SUCCINATE 100 MG/1
TABLET, EXTENDED RELEASE ORAL
Qty: 30 TABLET | Refills: 9 | Status: SHIPPED | OUTPATIENT
Start: 2023-02-10 | End: 2023-08-17

## 2023-02-10 NOTE — TELEPHONE ENCOUNTER
metoprolol succinate ER (TOPROL XL) 100 MG 24 hr tablet 30 tablet 3 12/8/2022     chlorthalidone (HYGROTON) 25 MG tablet 15 tablet 0 12/8/2022     lov 12/14/22

## 2023-02-12 ENCOUNTER — HEALTH MAINTENANCE LETTER (OUTPATIENT)
Age: 63
End: 2023-02-12

## 2023-02-21 DIAGNOSIS — I10 HYPERTENSION, UNSPECIFIED TYPE: ICD-10-CM

## 2023-02-24 NOTE — TELEPHONE ENCOUNTER
metoprolol succinate ER (TOPROL XL) 100 MG 24 hr tablet   Last Written Prescription Date:  2/10/2023  Last Fill Quantity: 30,   # refills: 9  Last Office Visit: 12/14/2022          amLODIPine (NORVASC) 10 MG tablet      Last Written Prescription Date:  11/10/2022  Last Fill Quantity: 30,   # refills: 2

## 2023-03-01 RX ORDER — METOPROLOL SUCCINATE 100 MG/1
TABLET, EXTENDED RELEASE ORAL
Qty: 30 TABLET | Refills: 0 | OUTPATIENT
Start: 2023-03-01

## 2023-03-01 RX ORDER — AMLODIPINE BESYLATE 10 MG/1
TABLET ORAL
Qty: 30 TABLET | Refills: 5 | Status: SHIPPED | OUTPATIENT
Start: 2023-03-01 | End: 2023-08-28

## 2023-08-03 DIAGNOSIS — K21.9 GASTROESOPHAGEAL REFLUX DISEASE: Primary | ICD-10-CM

## 2023-08-07 NOTE — TELEPHONE ENCOUNTER
Omeprazole      Last Written Prescription Date:  Patient Reported  Last Fill Quantity: NA,   # refills: NA  Last Office Visit: 12/14/22  Future Office visit:    Next 5 appointments (look out 90 days)      Aug 08, 2023  1:00 PM  (Arrive by 12:45 PM)  Office Visit with Shady Meyers DC  [facility] ([facility] ) [address]  [place] [address]  [phone]     Aug 28, 2023  9:00 AM  (Arrive by 8:45 AM)  SHORT with TESFAYE Amos CNP  [facility] ([facility] ) [address]  [place] [address]  [phone]             Routing refill request to provider for review/approval because:  Drug not active on patient's medication list

## 2023-08-08 ENCOUNTER — OFFICE VISIT (OUTPATIENT)
Dept: FAMILY MEDICINE | Facility: OTHER | Age: 63
End: 2023-08-08
Attending: CHIROPRACTOR
Payer: COMMERCIAL

## 2023-08-08 ENCOUNTER — ANCILLARY PROCEDURE (OUTPATIENT)
Dept: GENERAL RADIOLOGY | Facility: OTHER | Age: 63
End: 2023-08-08
Attending: CHIROPRACTOR
Payer: COMMERCIAL

## 2023-08-08 VITALS
OXYGEN SATURATION: 96 % | WEIGHT: 215.1 LBS | TEMPERATURE: 98.3 F | BODY MASS INDEX: 32.71 KG/M2 | DIASTOLIC BLOOD PRESSURE: 75 MMHG | SYSTOLIC BLOOD PRESSURE: 114 MMHG | HEART RATE: 83 BPM

## 2023-08-08 DIAGNOSIS — M50.30 DDD (DEGENERATIVE DISC DISEASE), CERVICAL: ICD-10-CM

## 2023-08-08 DIAGNOSIS — M50.30 DDD (DEGENERATIVE DISC DISEASE), CERVICAL: Primary | ICD-10-CM

## 2023-08-08 DIAGNOSIS — M51.369 DDD (DEGENERATIVE DISC DISEASE), LUMBAR: ICD-10-CM

## 2023-08-08 PROCEDURE — 99215 OFFICE O/P EST HI 40 MIN: CPT | Performed by: CHIROPRACTOR

## 2023-08-08 PROCEDURE — 72100 X-RAY EXAM L-S SPINE 2/3 VWS: CPT | Mod: TC | Performed by: RADIOLOGY

## 2023-08-08 PROCEDURE — 72170 X-RAY EXAM OF PELVIS: CPT | Mod: TC | Performed by: RADIOLOGY

## 2023-08-08 PROCEDURE — 72040 X-RAY EXAM NECK SPINE 2-3 VW: CPT | Mod: TC | Performed by: RADIOLOGY

## 2023-08-08 ASSESSMENT — PAIN SCALES - GENERAL: PAINLEVEL: MODERATE PAIN (5)

## 2023-08-08 NOTE — PROGRESS NOTES
CHIEF COMPLAINT:   Chief Complaint   Patient presents with    Back Pain       HISTORY OF PRESENTING INJURY     Zaira is a new patient to me, self-referred, for consideration of our Spine Program. She has a long history of chronic neck and low back pain.      Cervical Spine: fusion performed 2006 in [place], [place].  She mentions C6-7.  She has chronic headaches, sub-occipital/upper cervical tightness worse in the morning, tingling sensation at the base of the neck.  She takes 600 mg of ibuprofen daily for pain.  No current treatment plan, but she states quite active with different activities.  She works in [employer] here at [employer] and sits primarily most of her working day.  This severely affects her sleep.    Lumbar spine: microdiscectomy performed 2000 in [place], [place].  Her lower back was good for many years and she stayed active with exercise.  Around start of Covid, her back started to worsen and it is very painful today, mostly at the left groin to hip region.  This affects her leg strength and prevents her from sleeping.  She will have a good night of sleep on occasion, but this is rare.  Denies bowel/bladder changes or saddle anesthesia.      Oswestry (TONO) Questionnaire        8/8/2023    12:43 PM   OSWESTRY DISABILITY INDEX   Count 6   Sum 13   Oswestry Score (%) 43.33 %        PAST MEDICAL HISTORY:  No past medical history on file.    PAST SURGICAL HISTORY:  No past surgical history on file.    ALLERGIES:  Allergies   Allergen Reactions    Cats     Seasonal Allergies     Thimerosal     Hydromorphone Nausea    Ketorolac Tromethamine Nausea       CURRENT MEDICATIONS:  Current Outpatient Medications   Medication Sig Dispense Refill    allopurinol (ZYLOPRIM) 100 MG tablet Take 1 tablet (100 mg) by mouth daily 90 tablet 3    amitriptyline (ELAVIL) 25 MG tablet Take 1 tablet (25 mg) by mouth At Bedtime 90 tablet 1    amLODIPine (NORVASC) 10 MG tablet TAKE 1 TABLET BY MOUTH DAILY 30 tablet 5    atorvastatin (LIPITOR)  20 MG tablet Take 1 tablet (20 mg) by mouth At Bedtime 90 tablet 1    cetirizine HCl (ZYRTEC ALLERGY) 10 MG CAPS Take 1 tablet by mouth daily      chlorthalidone (HYGROTON) 25 MG tablet TAKE 1/2 TABLET (12.5MG) BY MOUTH DAILY 15 tablet 9    fluticasone (FLONASE) 50 MCG/ACT nasal spray Spray 2 sprays in nostril daily 18.2 mL 11    lisinopril (ZESTRIL) 40 MG tablet Take 1 tablet (40 mg) by mouth daily 90 tablet 1    metFORMIN (GLUCOPHAGE XR) 500 MG 24 hr tablet Take 1 tablet (500 mg) by mouth 2 times daily (with meals) 60 tablet 3    metoprolol succinate ER (TOPROL XL) 100 MG 24 hr tablet TAKE 1 TABLET BY MOUTH DAILY. DO NOT CRUSH OR CHEW. 30 tablet 9    omeprazole (PRILOSEC) 20 MG DR capsule TAKE 1 CAPSULE BY MOUTH DAILY BEFORE A MEAL. DO NOT CRUSH.      hydrOXYzine (ATARAX) 25 MG tablet Take 1 tablet (25 mg) by mouth 3 times daily as needed for anxiety (Patient not taking: Reported on 8/8/2023) 30 tablet 3    mupirocin (BACTROBAN) 2 % nasal ointment  (Patient not taking: Reported on 8/8/2023)      olopatadine (PATANOL) 0.1 % ophthalmic solution Place 1 drop into both eyes 2 times daily (Patient not taking: Reported on 8/8/2023) 5 mL 1       SOCIAL HISTORY:  Unremarkable     FAMILY HISTORY:  No family history on file.    REVIEW OF SYSTEMS:    Unremarkable other than chief complaint      PHYSICAL EXAM:   /75 (BP Location: Left arm, Patient Position: Sitting, Cuff Size: Adult Large)   Pulse 83   Temp 98.3  F (36.8  C) (Tympanic)   Wt 97.6 kg (215 lb 1.6 oz)   SpO2 96%   BMI 32.71 kg/m   Body mass index is 32.71 kg/m . General Appearance: No acute distress, very pleasant.    Cervical Spine:  Range of motion using Dual Inclinometers:   Flexion (50): 43   Extension (60): 51   Right Lateral Flexion (45): 31   Left Lateral Flexion (45): 44   Right Rotation (80): 63   Left Rotation (80): 80    Lumbar Spine:   L2: Hip Flexion: 5/5 bilaterally   L3: Knee Extension: 5/5 bilaterally   L4: Ankle Dorsiflexion: 5/5  bilaterally   L5: Great Toe Extension: 5/5 bilaterally   S1: Ankle Plantar Flexion, Ankle Eversion, Hip Extension: 5/5 bilaterally   S2: Knee Flexion: 5/5 bilaterally  Reflexes:   L3-L4: Patellar: 2+ right, 1+ left  Straight Leg Raiser Test: negative to 45 degrees bilaterally  Fabere Test: positive left  Ely's Test: positive left  Nachlas Test: positive left    New imaging performed for further analysis:        IMPRESSION/PLAN:    She is not a candidate for our Spine Program for her low back.  I am ordering an MRI and consult to be performed with Dr. Henderson of [facility].  Though I believe most of her pain is originating from her L5-S1, she does have evidence of left hip osteoarthritis that is contributing to her groin pain and that may need consult with ortho, should Dr. Henderson forego any management on this case after receiving MR results.    With regard to her cervical spine, there is retrolisthesis above her fusion and hypolordosis of the cervical spine that is contributing to her sub-occipital spasm and headaches.  She is a candidate for therapeutic management for this.  Order placed for our program for this.  Follow up with me in approximately 4-6 weeks.     She may continue her zoomba and yoga classes, but I advised no overhead activity or heavy lifting.    Total time spent today in chart review/preparation: 16 minutes  Total time spent today in face to face evaluation: 37 minutes  Total time spent today in documentation and care coordination: 11 minutes.        Shady Meyers DC, LA, MISTY  Director - Occupational Medicine Department  Diplomate of the American Board of Forensic Professionals  Board Certified - American Board of Independent Medical Examiners    1:23 PM 8/8/2023

## 2023-08-08 NOTE — NURSING NOTE
Patient presents to clinic today with chronic low back pain that radiates into the left hip. She had low back surgery (L5S1) in 2000. Patient states that the pain is gradually worse over the last couple years. Patient saw advertisement on Facebook page and called to schedule appointment.

## 2023-08-16 DIAGNOSIS — I10 HYPERTENSION, UNSPECIFIED TYPE: ICD-10-CM

## 2023-08-17 ENCOUNTER — MYC MEDICAL ADVICE (OUTPATIENT)
Dept: FAMILY MEDICINE | Facility: OTHER | Age: 63
End: 2023-08-17

## 2023-08-17 RX ORDER — METOPROLOL SUCCINATE 100 MG/1
TABLET, EXTENDED RELEASE ORAL
Qty: 90 TABLET | Refills: 0 | Status: SHIPPED | OUTPATIENT
Start: 2023-08-17 | End: 2023-08-28

## 2023-08-17 RX ORDER — CHLORTHALIDONE 25 MG/1
TABLET ORAL
Qty: 45 TABLET | Refills: 0 | Status: SHIPPED | OUTPATIENT
Start: 2023-08-17 | End: 2023-08-28

## 2023-08-17 NOTE — TELEPHONE ENCOUNTER
Toprol      Last Written Prescription Date:  02/10/23  Last Fill Quantity: 30,   # refills: 9  Last Office Visit: 12/14/22  Future Office visit:    Next 5 appointments (look out 90 days)      Aug 28, 2023  9:00 AM  (Arrive by 8:45 AM)  SHORT with TESFAYE Amos CNP  [facility] [place] ([facility] [place] ) [address]  [place] [address]  [phone]     Sep 19, 2023  1:00 PM  (Arrive by 12:45 PM)  Office Visit with Shady Meyers DC  [facility] [place] ([facility] [place] ) [address]  [place] [address]  [phone]             Chlorthalidone      Last Written Prescription Date:  02/10/23  Last Fill Quantity: 15,   # refills: 9  Last Office Visit: 12/14/22  Future Office visit:    Next 5 appointments (look out 90 days)      Aug 28, 2023  9:00 AM  (Arrive by 8:45 AM)  SHORT with TESFAYE Amos CNP  [facility] [place] ([facility] [place] ) [address]  [place] [address]  [phone]     Sep 19, 2023  1:00 PM  (Arrive by 12:45 PM)  Office Visit with Shady Meyers DC  [facility] [place] ([facility] [place] ) [address]  [place] [address]  [phone]

## 2023-08-23 DIAGNOSIS — I10 HYPERTENSION, UNSPECIFIED TYPE: ICD-10-CM

## 2023-08-24 NOTE — TELEPHONE ENCOUNTER
amitriptyline (ELAVIL) 25 MG tablet       Last Written Prescription Date:  2/01/2023  Last Fill Quantity: 90,   # refills: 1  Last Office Visit: 12/14/2022  Future Office visit:    Next 5 appointments (look out 90 days)      Aug 28, 2023  9:00 AM  (Arrive by 8:45 AM)  SHORT with TESFAYE Amos CNP  [facility] [place] ([facility] [place] ) [address]  [place] [address]  [phone]     Sep 19, 2023  1:00 PM  (Arrive by 12:45 PM)  Office Visit with Shady Meyers DC  [facility] [place] ([facility] [place] ) [address]  [place] [address]  [phone]

## 2023-08-25 NOTE — PROGRESS NOTES
Assessment & Plan     Prediabetes  A1c normal - does not have prediabetes at this time   Glucose slightly elevated possible some mild insulin resistance   Work on eating a healthy diet, staying active and work on weight loss  Can continue with metformin to help with insulin resistance     Essential hypertension  - continue current plan of care   -sodium is slightly low may need to consider decreasing chlorthalidone if remains low     Mixed hyperlipidemia  Encouraged to continue statin due to moderate risk CT calcium score of 170  - atorvastatin (LIPITOR) 20 MG tablet; Take 1 tablet (20 mg) by mouth At Bedtime    Idiopathic chronic gout of multiple sites without tophus  Increase allopurinol due to continued elevated uric acid level  Can repeat in a month or at next visit   - Uric acid; Future  - allopurinol (ZYLOPRIM) 100 MG tablet; Take 2 tablet (100 mg) by mouth daily    Hypertension, unspecified type  Stable blood pressure continue current medication   - amLODIPine (NORVASC) 10 MG tablet; Take 1 tablet (10 mg) by mouth daily  - chlorthalidone (HYGROTON) 25 MG tablet; 1/2 tablet daily  - lisinopril (ZESTRIL) 40 MG tablet; Take 1 tablet (40 mg) by mouth daily  - metoprolol succinate ER (TOPROL XL) 100 MG 24 hr tablet; TAKE 1 TABLET BY MOUTH DAILY. DO NOT CRUSH OR CHEW  - TSH with free T4 reflex; Future    Pure hypercholesterolemia  Refilled   - atorvastatin (LIPITOR) 20 MG tablet; Take 1 tablet (20 mg) by mouth At Bedtime    Allergic conjunctivitis, bilateral  Refilled   - fluticasone (FLONASE) 50 MCG/ACT nasal spray; Spray 2 sprays in nostril daily  - olopatadine (PATANOL) 0.1 % ophthalmic solution; Place 1 drop into both eyes 2 times daily    Gastroesophageal reflux disease without esophagitis  Refilled  She does continue to have some breath through acid reflux   - omeprazole (PRILOSEC) 20 MG DR capsule; TAKE 1 CAPSULE BY MOUTH DAILY BEFORE A MEAL. DO NOT CRUSH.    Screen for colon cancer  Due for screening   -  "Colonoscopy Screening  Referral; Future    Cervical cancer screening  Will schedule     Pulmonary sarcoidosis (H)  Due for updated chest CT   - CT Chest w Contrast; Future    Skin lesion  Warty like stuck on lesion under bra  She would like area removed   She will schedule removal.    Consider shaving off due to irritation.  We did discuss this will mostly return     Intermittent chest pain  Zaira has had intermittent chest pain with a moderate risk on CT coronary calcium   Normal TSH   Plan to check stress test   Consider referral to cardiology   - NM Lexiscan stress test; Future  - TSH with free T4 reflex; Future    Female stress incontinence  Zaira has stress incontinence. She has tried home pelvic therapy without any improvement.  She would like to try pelvic therapy   - Physical Therapy Referral; Future    Neck pain  Chronic bilateral low back pain with left-sided sciatica  Chronic neck pain work ing with Shady Meyers with occupational medicine   She has started a program for her neck and was referred to neurosurgery for her lower back       Ordering of each unique test  I spent a total of 44 minutes on the day of the visit.   Time spent by me doing chart review, history and exam, documentation and further activities per the note       BMI:   Estimated body mass index is 32.27 kg/m  as calculated from the following:    Height as of this encounter: 1.727 m (5' 8\").    Weight as of this encounter: 96.3 kg (212 lb 4 oz).   Weight management plan: Discussed healthy diet and exercise guidelines    See Patient Instructions    No follow-ups on file.    TESFAYE [employer] [employer] - AMANDA    Subjective   Zaira is a 63 year old, presenting for the following health issues:  Lesion, Arthritis, Hypertension, and Lipids        8/28/2023     8:48 AM   Additional Questions   Roomed by Lakeisha   Accompanied by self       HPI       Colon screening due for screening   PAP will schedule "     Skin Lesion     Duration: About a year ago   Description (location/character/radiation): On back. Red, and itchy.   Intensity:  moderate  Accompanying signs and symptoms: Itching   History (similar episodes/previous evaluation): Had the problem before, but it was removed an came back abut a year ago.   Precipitating or alleviating factors: None  Therapies tried and outcome: None        Prediabetes   Treatment with metformin for prediabetes   She is having a hard time losing weight   80% of weight loss is diet.  Encouraged to evaluate her diet     Hyperlipidemia Follow-Up    Are you regularly taking any medication or supplement to lower your cholesterol?   Yes- Atorvastatin   Are you having muscle aches or other side effects that you think could be caused by your cholesterol lowering medication?  Yes- Joints ache. Unsure why     Hypertension Follow-up    Do you check your blood pressure regularly outside of the clinic? No   Are you following a low salt diet? Yes  Are your blood pressures ever more than 140 on the top number (systolic) OR more   than 90 on the bottom number (diastolic), for example 140/90? No    Gout  No resent gout flair up chronic swelling to proximal joint right great toe  Continues with daily allopurinol           Review of Systems   CONSTITUTIONAL: NEGATIVE for fever, chills, change in weight  INTEGUMENTARY/SKIN: raised lesion under bra  EYES: worsening vision   ENT/MOUTH: NEGATIVE for ear, mouth and throat problems  RESP: NEGATIVE for significant cough or SOB  CV: has had some intermittent chest pain and has an elevated calcium score and fatigues easier   GI: loose stools and heartburn or reflux  : denies dysuria, stress incontinence   MUSCULOSKELETAL: multiple joint pain - going to chiropractor - chronic back pain   NEURO: NEGATIVE for weakness, dizziness or paresthesias  ENDOCRINE: NEGATIVE for temperature intolerance, skin/hair changes  PSYCHIATRIC: NEGATIVE for changes in mood or  "affect      Objective    /62 (BP Location: Left arm, Patient Position: Sitting, Cuff Size: Adult Large)   Pulse 76   Temp 98.6  F (37  C) (Tympanic)   Ht 1.727 m (5' 8\")   Wt 96.3 kg (212 lb 4 oz)   SpO2 96%   BMI 32.27 kg/m    Body mass index is 32.27 kg/m .  Physical Exam   GENERAL: alert, no distress, and obese  NECK: no adenopathy, no asymmetry, masses, or scars and thyroid normal to palpation  RESP: lungs clear to auscultation - no rales, rhonchi or wheezes  CV: regular rate and rhythm, normal S1 S2, no S3 or S4, no murmur, click or rub, no peripheral edema and peripheral pulses strong  ABDOMEN: soft, nontender, no hepatosplenomegaly, no masses and bowel sounds normal  SKIN: wartly like suck on lesion under bra causing irritation   PSYCH: mentation appears normal, affect normal/bright    Results for orders placed or performed in visit on 08/28/23 (from the past 24 hour(s))   Uric acid   Result Value Ref Range    Uric Acid 7.0 (H) 2.4 - 5.7 mg/dL     Lab Results   Component Value Date    A1C 5.4 08/28/2023     Last Comprehensive Metabolic Panel:  Sodium   Date Value Ref Range Status   08/28/2023 135 (L) 136 - 145 mmol/L Final     Potassium   Date Value Ref Range Status   08/28/2023 4.1 3.4 - 5.3 mmol/L Final   10/03/2022 3.7 3.4 - 5.3 mmol/L Final     Chloride   Date Value Ref Range Status   08/28/2023 100 98 - 107 mmol/L Final   10/03/2022 102 94 - 109 mmol/L Final     Carbon Dioxide (CO2)   Date Value Ref Range Status   08/28/2023 25 22 - 29 mmol/L Final   10/03/2022 30 20 - 32 mmol/L Final     Anion Gap   Date Value Ref Range Status   08/28/2023 10 7 - 15 mmol/L Final   10/03/2022 5 3 - 14 mmol/L Final     Glucose   Date Value Ref Range Status   08/28/2023 107 (H) 70 - 99 mg/dL Final   10/03/2022 108 (H) 70 - 99 mg/dL Final     Urea Nitrogen   Date Value Ref Range Status   08/28/2023 14.9 8.0 - 23.0 mg/dL Final   10/03/2022 21 7 - 30 mg/dL Final     Creatinine   Date Value Ref Range Status "   08/28/2023 0.88 0.51 - 0.95 mg/dL Final     GFR Estimate   Date Value Ref Range Status   08/28/2023 73 >60 mL/min/1.73m2 Final     Calcium   Date Value Ref Range Status   08/28/2023 9.4 8.8 - 10.2 mg/dL Final     Bilirubin Total   Date Value Ref Range Status   08/28/2023 0.5 <=1.2 mg/dL Final     Alkaline Phosphatase   Date Value Ref Range Status   08/28/2023 82 35 - 104 U/L Final     ALT   Date Value Ref Range Status   08/28/2023 43 0 - 50 U/L Final     Comment:     Reference intervals for this test were updated on 6/12/2023 to more accurately reflect our healthy population. There may be differences in the flagging of prior results with similar values performed with this method. Interpretation of those prior results can be made in the context of the updated reference intervals.       AST   Date Value Ref Range Status   08/28/2023 38 0 - 45 U/L Final     Comment:     Reference intervals for this test were updated on 6/12/2023 to more accurately reflect our healthy population. There may be differences in the flagging of prior results with similar values performed with this method. Interpretation of those prior results can be made in the context of the updated reference intervals.

## 2023-08-28 ENCOUNTER — OFFICE VISIT (OUTPATIENT)
Dept: FAMILY MEDICINE | Facility: OTHER | Age: 63
End: 2023-08-28
Attending: NURSE PRACTITIONER
Payer: COMMERCIAL

## 2023-08-28 ENCOUNTER — LAB (OUTPATIENT)
Dept: LAB | Facility: OTHER | Age: 63
End: 2023-08-28
Payer: COMMERCIAL

## 2023-08-28 VITALS
SYSTOLIC BLOOD PRESSURE: 110 MMHG | OXYGEN SATURATION: 96 % | HEART RATE: 76 BPM | WEIGHT: 212.25 LBS | BODY MASS INDEX: 32.17 KG/M2 | TEMPERATURE: 98.6 F | DIASTOLIC BLOOD PRESSURE: 62 MMHG | HEIGHT: 68 IN

## 2023-08-28 DIAGNOSIS — H10.13 ALLERGIC CONJUNCTIVITIS, BILATERAL: ICD-10-CM

## 2023-08-28 DIAGNOSIS — R07.9 INTERMITTENT CHEST PAIN: ICD-10-CM

## 2023-08-28 DIAGNOSIS — R73.03 PREDIABETES: Primary | ICD-10-CM

## 2023-08-28 DIAGNOSIS — M1A.09X0 IDIOPATHIC CHRONIC GOUT OF MULTIPLE SITES WITHOUT TOPHUS: ICD-10-CM

## 2023-08-28 DIAGNOSIS — D86.0 PULMONARY SARCOIDOSIS (H): ICD-10-CM

## 2023-08-28 DIAGNOSIS — Z12.11 SCREEN FOR COLON CANCER: ICD-10-CM

## 2023-08-28 DIAGNOSIS — K21.9 GASTROESOPHAGEAL REFLUX DISEASE WITHOUT ESOPHAGITIS: ICD-10-CM

## 2023-08-28 DIAGNOSIS — I10 HYPERTENSION, UNSPECIFIED TYPE: ICD-10-CM

## 2023-08-28 DIAGNOSIS — M54.2 NECK PAIN: ICD-10-CM

## 2023-08-28 DIAGNOSIS — L98.9 SKIN LESION: ICD-10-CM

## 2023-08-28 DIAGNOSIS — G89.29 CHRONIC BILATERAL LOW BACK PAIN WITH LEFT-SIDED SCIATICA: ICD-10-CM

## 2023-08-28 DIAGNOSIS — M54.42 CHRONIC BILATERAL LOW BACK PAIN WITH LEFT-SIDED SCIATICA: ICD-10-CM

## 2023-08-28 DIAGNOSIS — E78.2 MIXED HYPERLIPIDEMIA: ICD-10-CM

## 2023-08-28 DIAGNOSIS — I10 ESSENTIAL HYPERTENSION: ICD-10-CM

## 2023-08-28 DIAGNOSIS — Z12.4 CERVICAL CANCER SCREENING: ICD-10-CM

## 2023-08-28 DIAGNOSIS — N39.3 FEMALE STRESS INCONTINENCE: ICD-10-CM

## 2023-08-28 DIAGNOSIS — E78.00 PURE HYPERCHOLESTEROLEMIA: ICD-10-CM

## 2023-08-28 LAB — URATE SERPL-MCNC: 7 MG/DL (ref 2.4–5.7)

## 2023-08-28 PROCEDURE — 99215 OFFICE O/P EST HI 40 MIN: CPT | Performed by: NURSE PRACTITIONER

## 2023-08-28 PROCEDURE — 84550 ASSAY OF BLOOD/URIC ACID: CPT

## 2023-08-28 RX ORDER — LISINOPRIL 40 MG/1
40 TABLET ORAL DAILY
Qty: 90 TABLET | Refills: 3 | Status: SHIPPED | OUTPATIENT
Start: 2023-08-28 | End: 2024-09-16

## 2023-08-28 RX ORDER — OLOPATADINE HYDROCHLORIDE 1 MG/ML
1 SOLUTION/ DROPS OPHTHALMIC 2 TIMES DAILY
Qty: 5 ML | Refills: 1 | Status: SHIPPED | OUTPATIENT
Start: 2023-08-28

## 2023-08-28 RX ORDER — AMLODIPINE BESYLATE 10 MG/1
10 TABLET ORAL DAILY
Qty: 90 TABLET | Refills: 3 | Status: SHIPPED | OUTPATIENT
Start: 2023-08-28 | End: 2024-09-16

## 2023-08-28 RX ORDER — METOPROLOL SUCCINATE 100 MG/1
TABLET, EXTENDED RELEASE ORAL
Qty: 90 TABLET | Refills: 3 | Status: SHIPPED | OUTPATIENT
Start: 2023-08-28 | End: 2024-09-16

## 2023-08-28 RX ORDER — ALLOPURINOL 100 MG/1
200 TABLET ORAL DAILY
Qty: 180 TABLET | Refills: 3 | Status: SHIPPED | OUTPATIENT
Start: 2023-08-28 | End: 2024-01-16

## 2023-08-28 RX ORDER — ALLOPURINOL 100 MG/1
100 TABLET ORAL DAILY
Qty: 90 TABLET | Refills: 3 | Status: SHIPPED | OUTPATIENT
Start: 2023-08-28 | End: 2023-08-28

## 2023-08-28 RX ORDER — CHLORTHALIDONE 25 MG/1
TABLET ORAL
Qty: 45 TABLET | Refills: 3 | Status: SHIPPED | OUTPATIENT
Start: 2023-08-28 | End: 2024-09-16

## 2023-08-28 RX ORDER — ATORVASTATIN CALCIUM 20 MG/1
20 TABLET, FILM COATED ORAL AT BEDTIME
Qty: 90 TABLET | Refills: 1 | Status: SHIPPED | OUTPATIENT
Start: 2023-08-28 | End: 2024-04-02

## 2023-08-28 RX ORDER — FLUTICASONE PROPIONATE 50 MCG
2 SPRAY, SUSPENSION (ML) NASAL DAILY
Qty: 18.2 ML | Refills: 11 | Status: SHIPPED | OUTPATIENT
Start: 2023-08-28 | End: 2024-09-16

## 2023-08-28 ASSESSMENT — PAIN SCALES - GENERAL: PAINLEVEL: NO PAIN (0)

## 2023-08-28 NOTE — PATIENT INSTRUCTIONS
LIFESTYLE CHANGES    Eat a Healthy diet  Eat more vegetables/plants in your diet  Eat health fats  [place] oil  avocados  Eat healthy proteins  Poultry without the skin  Fish  Limit red meat  Nuts - limit to 1/4 cup per day   Increase physical activity  Slowly work up to 30 minutes of physical activity most days of the week  Aerobic activity 150 minute a week  2 days of resistance exercising       Try daily yoga and meditation

## 2023-08-31 ENCOUNTER — TELEPHONE (OUTPATIENT)
Dept: FAMILY MEDICINE | Facility: OTHER | Age: 63
End: 2023-08-31

## 2023-09-26 ENCOUNTER — TELEPHONE (OUTPATIENT)
Dept: NUCLEAR MEDICINE | Facility: HOSPITAL | Age: 63
End: 2023-09-26

## 2023-09-26 NOTE — TELEPHONE ENCOUNTER
Attempted to perform appointment reminder    General voicemail left with my direct phone number, scheduling phone number, and our hours.

## 2023-10-04 ENCOUNTER — HOSPITAL ENCOUNTER (OUTPATIENT)
Dept: NUCLEAR MEDICINE | Facility: HOSPITAL | Age: 63
Setting detail: NUCLEAR MEDICINE
Discharge: HOME | End: 2023-10-04
Attending: NURSE PRACTITIONER
Payer: COMMERCIAL

## 2023-10-04 ENCOUNTER — HOSPITAL ENCOUNTER (OUTPATIENT)
Dept: CARDIOLOGY | Facility: HOSPITAL | Age: 63
Setting detail: NUCLEAR MEDICINE
Discharge: HOME | End: 2023-10-04
Attending: NURSE PRACTITIONER
Payer: COMMERCIAL

## 2023-10-04 DIAGNOSIS — R07.9 INTERMITTENT CHEST PAIN: ICD-10-CM

## 2023-10-04 LAB
CV BLOOD PRESSURE: 69 MMHG
CV STRESS MAX HR HE: 101
NUC STRESS EJECTION FRACTION: 75 %
RATE PRESSURE PRODUCT: NORMAL
STRESS ECHO BASELINE DIASTOLIC HE: 76
STRESS ECHO BASELINE HR: 74 BPM
STRESS ECHO BASELINE SYSTOLIC BP: 132
STRESS ECHO CALCULATED PERCENT HR: 64 %
STRESS ECHO LAST STRESS DIASTOLIC BP: 74
STRESS ECHO LAST STRESS SYSTOLIC BP: 128
STRESS ECHO TARGET HR: 157

## 2023-10-04 PROCEDURE — 93017 CV STRESS TEST TRACING ONLY: CPT

## 2023-10-04 PROCEDURE — A9500 TC99M SESTAMIBI: HCPCS | Performed by: RADIOLOGY

## 2023-10-04 PROCEDURE — 93018 CV STRESS TEST I&R ONLY: CPT | Performed by: INTERNAL MEDICINE

## 2023-10-04 PROCEDURE — 343N000001 HC RX 343: Performed by: RADIOLOGY

## 2023-10-04 PROCEDURE — 78452 HT MUSCLE IMAGE SPECT MULT: CPT

## 2023-10-04 PROCEDURE — 250N000011 HC RX IP 250 OP 636: Performed by: INTERNAL MEDICINE

## 2023-10-04 PROCEDURE — 93016 CV STRESS TEST SUPVJ ONLY: CPT | Performed by: INTERNAL MEDICINE

## 2023-10-04 RX ORDER — REGADENOSON 0.08 MG/ML
0.4 INJECTION, SOLUTION INTRAVENOUS ONCE
Status: COMPLETED | OUTPATIENT
Start: 2023-10-04 | End: 2023-10-04

## 2023-10-04 RX ADMIN — REGADENOSON 0.4 MG: 0.08 INJECTION, SOLUTION INTRAVENOUS at 09:20

## 2023-10-04 RX ADMIN — Medication 10.7 MILLICURIE: at 07:21

## 2023-10-04 RX ADMIN — Medication 32.1 MILLICURIE: at 09:20

## 2023-11-01 ENCOUNTER — APPOINTMENT (OUTPATIENT)
Dept: OCCUPATIONAL MEDICINE | Facility: OTHER | Age: 63
End: 2023-11-01

## 2023-11-09 ENCOUNTER — OFFICE VISIT (OUTPATIENT)
Dept: FAMILY MEDICINE | Facility: OTHER | Age: 63
End: 2023-11-09
Attending: FAMILY MEDICINE
Payer: COMMERCIAL

## 2023-11-09 VITALS
SYSTOLIC BLOOD PRESSURE: 111 MMHG | OXYGEN SATURATION: 95 % | HEIGHT: 68 IN | BODY MASS INDEX: 32.58 KG/M2 | DIASTOLIC BLOOD PRESSURE: 72 MMHG | HEART RATE: 81 BPM | WEIGHT: 215 LBS | TEMPERATURE: 97.8 F

## 2023-11-09 DIAGNOSIS — R05.8 PRODUCTIVE COUGH: ICD-10-CM

## 2023-11-09 DIAGNOSIS — J20.9 ACUTE BRONCHITIS, UNSPECIFIED ORGANISM: Primary | ICD-10-CM

## 2023-11-09 PROCEDURE — 99213 OFFICE O/P EST LOW 20 MIN: CPT | Performed by: FAMILY MEDICINE

## 2023-11-09 RX ORDER — PREDNISONE 20 MG/1
60 TABLET ORAL DAILY
Qty: 15 TABLET | Refills: 0 | Status: SHIPPED | OUTPATIENT
Start: 2023-11-09 | End: 2023-11-14

## 2023-11-09 RX ORDER — ALBUTEROL SULFATE 90 UG/1
2 AEROSOL, METERED RESPIRATORY (INHALATION) EVERY 6 HOURS PRN
Qty: 18 G | Refills: 0 | Status: SHIPPED | OUTPATIENT
Start: 2023-11-09 | End: 2024-09-18

## 2023-11-09 RX ORDER — DOXYCYCLINE HYCLATE 100 MG
100 TABLET ORAL 2 TIMES DAILY
Qty: 14 TABLET | Refills: 0 | Status: SHIPPED | OUTPATIENT
Start: 2023-11-09 | End: 2023-11-16

## 2023-11-09 NOTE — PROGRESS NOTES
Assessment & Plan     Acute bronchitis, unspecified organism  - doxycycline hyclate (VIBRA-TABS) 100 MG tablet; Take 1 tablet (100 mg) by mouth 2 times daily for 7 days  - predniSONE (DELTASONE) 20 MG tablet; Take 3 tablets (60 mg) by mouth daily for 5 days  - albuterol (PROAIR HFA/PROVENTIL HFA/VENTOLIN HFA) 108 (90 Base) MCG/ACT inhaler; Inhale 2 puffs into the lungs every 6 hours as needed for shortness of breath, wheezing or cough    Productive cough      Follow-up if worsening, not improving as anticipated/discussed, or any new symptoms/concerns.     ALEXANDER ANGEL, [facility] - AMANDA Meneses is a 63 year old, presenting for the following health issues:  URI        11/9/2023     4:10 PM   Additional Questions   Roomed by Jennifer seth   Accompanied by none         11/9/2023     4:10 PM   Patient Reported Additional Medications   Patient reports taking the following new medications none       HPI     Cough x3+ weeks.  Gunk in chest, sometimes productive  Intermittent sinus drainage.  Ibuprofen helps.  Does feel a little winded going up stairs at work, but no issues at [employer] class.  Sore throat from cough  Sweats the past few days.  Tired, achy.  Inactive sarcoidosis  Latent TB - never treated  Quit smoking 33-34 years ago        Review of Systems   Constitutional:  Negative for fever.   Cardiovascular:  Negative for peripheral edema.            Objective    /72 (BP Location: Left arm, Patient Position: Sitting, Cuff Size: Adult Regular)   Pulse 81   Temp 97.8  F (36.6  C) (Tympanic)   Wt 97.5 kg (215 lb)   SpO2 95%   BMI 32.69 kg/m    Body mass index is 32.69 kg/m .  Physical Exam  Constitutional:       General: She is not in acute distress.     Appearance: Normal appearance.   HENT:      Head: Normocephalic and atraumatic.      Right Ear: Tympanic membrane normal.      Left Ear: Tympanic membrane normal.      Mouth/Throat:      Mouth: Mucous membranes are moist.       Pharynx: Oropharynx is clear.   Eyes:      Conjunctiva/sclera: Conjunctivae normal.      Pupils: Pupils are equal, round, and reactive to light.   Cardiovascular:      Rate and Rhythm: Normal rate and regular rhythm.      Heart sounds: Normal heart sounds. No murmur heard.  Pulmonary:      Effort: Pulmonary effort is normal.      Breath sounds: Normal breath sounds. No wheezing, rhonchi or rales.   Musculoskeletal:      Right lower leg: No edema.      Left lower leg: No edema.   Lymphadenopathy:      Cervical: No cervical adenopathy.   Neurological:      Mental Status: She is alert and oriented to person, place, and time.

## 2023-11-15 ASSESSMENT — ENCOUNTER SYMPTOMS: FEVER: 0

## 2023-11-20 ENCOUNTER — MYC REFILL (OUTPATIENT)
Dept: FAMILY MEDICINE | Facility: OTHER | Age: 63
End: 2023-11-20

## 2023-11-20 DIAGNOSIS — I10 HYPERTENSION, UNSPECIFIED TYPE: ICD-10-CM

## 2023-11-20 NOTE — TELEPHONE ENCOUNTER
Elavil      Last Written Prescription Date:  08/24/23  Last Fill Quantity: 90,   # refills: 0  Last Office Visit: 08/28/23  Future Office visit:

## 2023-12-04 ENCOUNTER — MYC MEDICAL ADVICE (OUTPATIENT)
Dept: FAMILY MEDICINE | Facility: OTHER | Age: 63
End: 2023-12-04

## 2023-12-20 DIAGNOSIS — F51.01 PRIMARY INSOMNIA: ICD-10-CM

## 2023-12-21 NOTE — TELEPHONE ENCOUNTER
Hydroxyzine      Last Written Prescription Date:  11/10/22  Last Fill Quantity: 30,   # refills: 3  Last Office Visit: 11/9/23  Future Office visit:       Routing refill request to provider for review/approval because:

## 2023-12-22 RX ORDER — HYDROXYZINE HYDROCHLORIDE 25 MG/1
25 TABLET, FILM COATED ORAL 3 TIMES DAILY PRN
Qty: 30 TABLET | Refills: 4 | Status: SHIPPED | OUTPATIENT
Start: 2023-12-22 | End: 2024-09-18

## 2024-01-02 ENCOUNTER — OFFICE VISIT (OUTPATIENT)
Dept: FAMILY MEDICINE | Facility: OTHER | Age: 64
End: 2024-01-02
Attending: CHIROPRACTOR
Payer: COMMERCIAL

## 2024-01-02 VITALS
BODY MASS INDEX: 32.52 KG/M2 | HEART RATE: 77 BPM | TEMPERATURE: 98.6 F | OXYGEN SATURATION: 97 % | HEIGHT: 68 IN | SYSTOLIC BLOOD PRESSURE: 120 MMHG | DIASTOLIC BLOOD PRESSURE: 60 MMHG | WEIGHT: 214.6 LBS

## 2024-01-02 DIAGNOSIS — M51.369 DDD (DEGENERATIVE DISC DISEASE), LUMBAR: Primary | ICD-10-CM

## 2024-01-02 DIAGNOSIS — M50.30 DDD (DEGENERATIVE DISC DISEASE), CERVICAL: ICD-10-CM

## 2024-01-02 PROCEDURE — 99213 OFFICE O/P EST LOW 20 MIN: CPT | Performed by: CHIROPRACTOR

## 2024-01-02 ASSESSMENT — PAIN SCALES - GENERAL: PAINLEVEL: EXTREME PAIN (8)

## 2024-01-02 NOTE — PROGRESS NOTES
"CHIEF COMPLAINT:   Chief Complaint   Patient presents with    RECHECK       HISTORY OF PRESENTING INJURY     Zaira returns today to restart her cervical spine therapy program as well as reinitiate referral to Dr. Henderson along with MRI for consideration of worsening low back pain.  She states her low back is \"the worst its ever been\".  She needed to reschedule the previous scheduled plan due to her  having open heart surgery.  She denies any new symptoms, signs/symptoms of cauda equina, new motor loss.  She does have worsening of the acute left-sided hip pain again that travels to the groin.      PAST MEDICAL HISTORY:  No past medical history on file.    PAST SURGICAL HISTORY:  No past surgical history on file.    ALLERGIES:  Allergies   Allergen Reactions    Cats     Other Environmental Allergy     Seasonal Allergies     Thimerosal (Thiomersal)     Hydromorphone Nausea    Ketorolac Tromethamine Nausea       CURRENT MEDICATIONS:  Current Outpatient Medications   Medication Sig Dispense Refill    albuterol (PROAIR HFA/PROVENTIL HFA/VENTOLIN HFA) 108 (90 Base) MCG/ACT inhaler Inhale 2 puffs into the lungs every 6 hours as needed for shortness of breath, wheezing or cough 18 g 0    allopurinol (ZYLOPRIM) 100 MG tablet Take 2 tablets (200 mg) by mouth daily 180 tablet 3    amitriptyline (ELAVIL) 25 MG tablet Take 1 tablet (25 mg) by mouth at bedtime 90 tablet 2    amLODIPine (NORVASC) 10 MG tablet Take 1 tablet (10 mg) by mouth daily 90 tablet 3    atorvastatin (LIPITOR) 20 MG tablet Take 1 tablet (20 mg) by mouth At Bedtime 90 tablet 1    cetirizine HCl (ZYRTEC ALLERGY) 10 MG CAPS Take 1 tablet by mouth daily      chlorthalidone (HYGROTON) 25 MG tablet 1/2 tablet daily 45 tablet 3    fluticasone (FLONASE) 50 MCG/ACT nasal spray Spray 2 sprays in nostril daily 18.2 mL 11    hydrOXYzine HCl (ATARAX) 25 MG tablet TAKE 1 TABLET BY MOUTH 3 TIMES DAILY AS NEEDED FOR ANXIETY 30 tablet 4    lisinopril (ZESTRIL) 40 MG " "tablet Take 1 tablet (40 mg) by mouth daily 90 tablet 3    metFORMIN (GLUCOPHAGE XR) 500 MG 24 hr tablet Take 1 tablet (500 mg) by mouth 2 times daily (with meals) 60 tablet 3    metoprolol succinate ER (TOPROL XL) 100 MG 24 hr tablet TAKE 1 TABLET BY MOUTH DAILY. DO NOT CRUSH OR CHEW 90 tablet 3    mupirocin (BACTROBAN) 2 % nasal ointment       olopatadine (PATANOL) 0.1 % ophthalmic solution Place 1 drop into both eyes 2 times daily 5 mL 1    omeprazole (PRILOSEC) 20 MG DR capsule TAKE 1 CAPSULE BY MOUTH DAILY BEFORE A MEAL. DO NOT CRUSH. 90 capsule 3       SOCIAL HISTORY:  Unremarkable     FAMILY HISTORY:  No family history on file.    REVIEW OF SYSTEMS:    Unremarkable other than chief complaint      PHYSICAL EXAM:   /60 (BP Location: Right arm, Patient Position: Sitting, Cuff Size: Adult Regular)   Pulse 77   Temp 98.6  F (37  C) (Tympanic)   Ht 1.727 m (5' 8\")   Wt 97.3 kg (214 lb 9.6 oz)   SpO2 97%   BMI 32.63 kg/m   Body mass index is 32.63 kg/m .  General Appearance: No acute distress.  Ortho/neuro exam is unchanged from previous encounter.  No new findings to report.  There is worsening with pain on testing.      IMPRESSION/PLAN:    Restarted referral process to begin cervical spine therapy program here at [facility].  Also ordered MRI for low back evaluation and as per requested to [facility].  Referral placed to see him in [facility].  Plan to follow-up with her 4 weeks after starting physical therapy on her cervical spine.  Plan for low back management to be determined based on MRI findings and neurosurgery consult.  She had no additional questions today.      Total time spent today in chart review/preparation, face to face evaluation, and documentation: 24 minutes.        Shady Meyers DC, DABFP, CICE  Director - Occupational Medicine Department  Diplomate of the American Board of Forensic Professionals  Board Certified - American Board of " Independent Medical Examiners    9:59 AM 1/2/2024

## 2024-01-04 ENCOUNTER — HOSPITAL ENCOUNTER (OUTPATIENT)
Dept: MRI IMAGING | Facility: HOSPITAL | Age: 64
Discharge: HOME | End: 2024-01-04
Attending: CHIROPRACTOR | Admitting: CHIROPRACTOR
Payer: COMMERCIAL

## 2024-01-04 DIAGNOSIS — M51.369 DDD (DEGENERATIVE DISC DISEASE), LUMBAR: ICD-10-CM

## 2024-01-04 PROCEDURE — 72148 MRI LUMBAR SPINE W/O DYE: CPT

## 2024-01-08 ENCOUNTER — MYC MEDICAL ADVICE (OUTPATIENT)
Dept: FAMILY MEDICINE | Facility: OTHER | Age: 64
End: 2024-01-08

## 2024-01-15 NOTE — PROGRESS NOTES
Assessment & Plan     Neck pain  Chronic bilateral low back pain with left-sided sciatica  Increased back pain. Will be starting spine program.  Continue to follow up with neurosurgery and occupational medicine.  She is taking 600 mg of ibuprofen regularly at this time.  Plan to switch to Toradol if tolerates it for short term   - ketorolac (TORADOL) 10 MG tablet; Take 1 tablet (10 mg) by mouth every 6 hours as needed    Idiopathic chronic gout of multiple sites without tophus  Refilled   - allopurinol (ZYLOPRIM) 100 MG tablet; Take 2 tablets (200 mg) by mouth daily    Screen for colon cancer  Due for screening - may need to consider colonoscopy due to history of diverticulitis, but no current symptoms   - COLOGUARD([employer]); Future    Chronic cough  History of pulmonary nodules, worsening cough, chronic in nature not over 3 months   - CT Chest w/o Contrast; Future  - azithromycin (ZITHROMAX) 250 MG tablet; Take 2 tablets (500 mg) by mouth daily for 1 day, THEN 1 tablet (250 mg) daily for 4 days.  - CT Calcium Screening; Future    Pulmonary sarcoidosis (H24)  Pulmonary nodules  Elevated coronary artery calcium score  Due to update calcium score and CT chest for multiple pulmonary nodules, history of pulmonary sarcoidosis and chronic cough   - CT Chest w/o Contrast; Future  - CT Calcium Screening; Future    Cancelled calcium screening - does not need to be repeated.   Plan to order CMP - to check kidney and liver function with history of sarcoidosis     Ordering of each unique test  I spent a total of 32 minutes on the day of the visit.   Time spent by me doing chart review, history and exam, documentation and further activities per the note       See Patient Instructions    No follow-ups on file.    TESFAYE Dietz [facility] - AMANDA Melara   Zaira is a 63 year old, presenting for the following health issues:  chronic pain         1/2/2024     9:24 AM   Additional  "Questions   Roomed by Esther CHAUDHARY LPN   Accompanied by self       HPI     Colon screening - will consider cologuard   PAP - should schedule a physical     Chronic/Recurring Back Pain Follow Up    Where is your back pain located? (Select all that apply) low back middle back go towards the right side and feels tight to breath, and lower 3 vertebrae causes pain to wrap around to left hip  How would you describe your back pain?  Depends activity   Where does your back pain spread? the right and left shoulder and the right and left neck  Since your last clinic visit for back pain, how has your pain changed? Worsening   Does your back pain interfere with your job? No  Since your last visit, have you tried any new treatment? Yes -  referred to [facility] will start his pain clinic end of January.  Follows with occupational medicine [facility] DC and neurosurgery Dr Henedrson     Chronic cough   Previously seen in November for bronchitis - did not tolerate treatment of steroids and antibiotic and inhaler.  She did not complete treatment.  She does have diagnosis of pulmonary sarcoidosis and nodules.  Last CT was 2 years ago.          Review of Systems   CONSTITUTIONAL: NEGATIVE for fever, chills, change in weight  INTEGUMENTARY/SKIN: NEGATIVE for worrisome rashes, moles or lesions  ENT/MOUTH: NEGATIVE for ear, mouth and throat problems  RESP:chronic cough   CV: NEGATIVE for chest pain, palpitations or peripheral edema  GI: increased constipation over the past 6 months   : denies dysuria, stress incontinence has worsened   MUSCULOSKELETAL: chronic neck and back pain   NEURO: left leg weakness       Objective    /64 (BP Location: Left arm, Patient Position: Sitting, Cuff Size: Adult Regular)   Pulse 94   Temp 97.9  F (36.6  C) (Tympanic)   Ht 1.727 m (5' 8\")   Wt 94.9 kg (209 lb 3.2 oz)   SpO2 98%   BMI 31.81 kg/m    Body mass index is 31.81 kg/m .  Physical Exam   GENERAL: alert, no distress, and obese  RESP: " lungs clear to auscultation - no rales, rhonchi or wheezes and dry cough harsh at times   CV: regular rate and rhythm, normal S1 S2, no S3 or S4, no murmur, click or rub, no peripheral edema and peripheral pulses strong  ABDOMEN: soft, nontender, no hepatosplenomegaly, no masses and bowel sounds normal  PSYCH: mentation appears normal, affect normal/bright    Plan to updated CT of the lungs

## 2024-01-16 ENCOUNTER — ORDERS ONLY (AUTO-RELEASED) (OUTPATIENT)
Dept: FAMILY MEDICINE | Facility: OTHER | Age: 64
End: 2024-01-16

## 2024-01-16 ENCOUNTER — TELEPHONE (OUTPATIENT)
Dept: FAMILY MEDICINE | Facility: OTHER | Age: 64
End: 2024-01-16

## 2024-01-16 ENCOUNTER — OFFICE VISIT (OUTPATIENT)
Dept: FAMILY MEDICINE | Facility: OTHER | Age: 64
End: 2024-01-16
Attending: NURSE PRACTITIONER
Payer: COMMERCIAL

## 2024-01-16 VITALS
HEIGHT: 68 IN | WEIGHT: 209.2 LBS | TEMPERATURE: 97.9 F | DIASTOLIC BLOOD PRESSURE: 64 MMHG | OXYGEN SATURATION: 98 % | BODY MASS INDEX: 31.71 KG/M2 | SYSTOLIC BLOOD PRESSURE: 122 MMHG | HEART RATE: 94 BPM

## 2024-01-16 DIAGNOSIS — Z12.11 SCREEN FOR COLON CANCER: ICD-10-CM

## 2024-01-16 DIAGNOSIS — D86.0 PULMONARY SARCOIDOSIS (H): ICD-10-CM

## 2024-01-16 DIAGNOSIS — G89.29 CHRONIC BILATERAL LOW BACK PAIN WITH LEFT-SIDED SCIATICA: ICD-10-CM

## 2024-01-16 DIAGNOSIS — M54.42 CHRONIC BILATERAL LOW BACK PAIN WITH LEFT-SIDED SCIATICA: ICD-10-CM

## 2024-01-16 DIAGNOSIS — M1A.09X0 IDIOPATHIC CHRONIC GOUT OF MULTIPLE SITES WITHOUT TOPHUS: ICD-10-CM

## 2024-01-16 DIAGNOSIS — R91.8 PULMONARY NODULES: ICD-10-CM

## 2024-01-16 DIAGNOSIS — R93.1 ELEVATED CORONARY ARTERY CALCIUM SCORE: ICD-10-CM

## 2024-01-16 DIAGNOSIS — R05.3 CHRONIC COUGH: ICD-10-CM

## 2024-01-16 DIAGNOSIS — M54.2 NECK PAIN: Primary | ICD-10-CM

## 2024-01-16 PROCEDURE — 99214 OFFICE O/P EST MOD 30 MIN: CPT | Performed by: NURSE PRACTITIONER

## 2024-01-16 RX ORDER — KETOROLAC TROMETHAMINE 10 MG/1
10 TABLET, FILM COATED ORAL EVERY 6 HOURS PRN
Qty: 20 TABLET | Refills: 0 | Status: SHIPPED | OUTPATIENT
Start: 2024-01-16 | End: 2024-02-13

## 2024-01-16 RX ORDER — ALLOPURINOL 100 MG/1
200 TABLET ORAL DAILY
Qty: 180 TABLET | Refills: 3 | Status: SHIPPED | OUTPATIENT
Start: 2024-01-16

## 2024-01-16 RX ORDER — AZITHROMYCIN 250 MG/1
TABLET, FILM COATED ORAL
Qty: 6 TABLET | Refills: 0 | Status: SHIPPED | OUTPATIENT
Start: 2024-01-16 | End: 2024-01-21

## 2024-01-16 ASSESSMENT — PAIN SCALES - GENERAL: PAINLEVEL: SEVERE PAIN (7)

## 2024-01-16 NOTE — PATIENT INSTRUCTIONS
For nausea   Eat protein snacks through out the day  Can try hong chew to help with nausea at the time of nausea       Someone will call to schedule CT chest and calcium score

## 2024-01-18 ENCOUNTER — HOSPITAL ENCOUNTER (OUTPATIENT)
Dept: CT IMAGING | Facility: HOSPITAL | Age: 64
Discharge: HOME | End: 2024-01-18
Attending: NURSE PRACTITIONER | Admitting: NURSE PRACTITIONER
Payer: COMMERCIAL

## 2024-01-18 DIAGNOSIS — R91.8 PULMONARY NODULES: ICD-10-CM

## 2024-01-18 DIAGNOSIS — R05.3 CHRONIC COUGH: ICD-10-CM

## 2024-01-18 DIAGNOSIS — D86.0 PULMONARY SARCOIDOSIS (H): ICD-10-CM

## 2024-01-18 DIAGNOSIS — R93.1 ELEVATED CORONARY ARTERY CALCIUM SCORE: ICD-10-CM

## 2024-01-18 PROCEDURE — 71250 CT THORAX DX C-: CPT

## 2024-01-25 ENCOUNTER — MYC MEDICAL ADVICE (OUTPATIENT)
Dept: FAMILY MEDICINE | Facility: OTHER | Age: 64
End: 2024-01-25

## 2024-01-25 DIAGNOSIS — G89.29 CHRONIC BILATERAL LOW BACK PAIN WITH LEFT-SIDED SCIATICA: ICD-10-CM

## 2024-01-25 DIAGNOSIS — M54.42 CHRONIC BILATERAL LOW BACK PAIN WITH LEFT-SIDED SCIATICA: ICD-10-CM

## 2024-01-25 DIAGNOSIS — M51.369 DDD (DEGENERATIVE DISC DISEASE), LUMBAR: ICD-10-CM

## 2024-01-25 DIAGNOSIS — M54.2 NECK PAIN: Primary | ICD-10-CM

## 2024-01-26 ENCOUNTER — THERAPY VISIT (OUTPATIENT)
Dept: PHYSICAL THERAPY | Facility: HOSPITAL | Age: 64
End: 2024-01-26
Attending: CHIROPRACTOR
Payer: COMMERCIAL

## 2024-01-26 DIAGNOSIS — M51.369 DDD (DEGENERATIVE DISC DISEASE), LUMBAR: Primary | ICD-10-CM

## 2024-01-26 PROCEDURE — 97140 MANUAL THERAPY 1/> REGIONS: CPT | Mod: GP

## 2024-01-26 PROCEDURE — 97161 PT EVAL LOW COMPLEX 20 MIN: CPT | Mod: GP

## 2024-01-26 NOTE — PROGRESS NOTES
PHYSICAL THERAPY EVALUATION  Type of Visit: Evaluation    See electronic medical record for Abuse and Falls Screening details.    Subjective       Presenting condition or subjective complaint: chronic neck pain and degenerative disc disease  Date of onset: 24    Relevant medical history:     Dates & types of surgery:  L5S1 and  C6C7    Prior diagnostic imaging/testing results: MRI; CT scan; X-ray     Prior therapy history for the same diagnosis, illness or injury: Yes pre covid had pt to relieve pain and build strength    Prior Level of Function  Transfers: Independent  Ambulation: Independent  ADL: Independent    Living Environment  Social support: With a significant other or spouse   Type of home: House; Apartment/condo; Multi-level   Stairs to enter the home:         Ramp: No   Stairs inside the home: Yes 15 Is there a railing: Yes   Help at home: None  Equipment owned: Straight Cane; Four-point cane; Walker; Grab bars     Employment: Yes office  Hobbies/Interests: fishing swimming gardening    Patient goals for therapy: relieve pain inflammation and strength    Pain assessment: Pain present  Location: Cervical/Thoracic/Lumbar Pain/Ratin/10     Objective      Cognitive Status Examination  Orientation: Oriented to person, place and time   Level of Consciousness: Alert  Follows Commands and Answers Questions: 100% of the time  Personal Safety and Judgement: Intact  Memory: Intact    OBSERVATION: Patient displayed antalgic gait pattern upon rising from the chair in the lobby  INTEGUMENTARY: Intact  POSTURE: Standing Posture: Rounded shoulders, Forward head, Thoracic kyphosis increased  Sitting Posture: Rounded shoulders, Forward head, Thoracic kyphosis increased  PALPATION: Tenderness to palpation along B upper trap/cervial/occipital region  RANGE OF MOTION: LE ROM WFL  UE ROM WFL  (Degrees) Left AROM Right AROM    Cervical Flexion WFL     Cervical Extension WFL (pain/tightness noted at end range)     Cervical Side bend 21 19    Cervical Rotation 63 56    Cervical Protrusion Not assessed    Cervical Retraction Able to demonstrate    Thoracic Flexion Not assessed     Thoracic Extension Not assessed     Thoracic Rotation Not assessed        End Feel:   STRENGTH: LE Strength WFL  UE Strength WFL    BED MOBILITY: WFL    TRANSFERS: Independent    GAIT:   Level of [place]: WFL  Assistive Device(s): None  Gait Deviations: Antalgic    Assessment & Plan   CLINICAL IMPRESSIONS  Medical Diagnosis: DDD (degenerative disc disease), lumbar    Treatment Diagnosis: DDD (degenerative disc disease), lumbar   Impression/Assessment: Patient is a 63 year old female with cervical pain/B shoulder/thoracic/lumbar pain complaints.  The following significant findings have been identified: Pain, Decreased ROM/flexibility, Decreased joint mobility, Decreased strength, Inflammation, Impaired muscle performance, and Decreased activity tolerance. These impairments interfere with their ability to perform self care tasks, work tasks, recreational activities, household chores, driving , household mobility, and community mobility as compared to previous level of function.     Clinical Decision Making (Complexity):  Clinical Presentation: Stable/Uncomplicated  Clinical Presentation Rationale: based on medical and personal factors listed in PT evaluation  Clinical Decision Making (Complexity): Low complexity    PLAN OF CARE  Treatment Interventions:  Modalities: Cryotherapy, Dry Needling, E-stim, Hot Pack, Mechanical Traction, Ultrasound  Interventions: Gait Training, Manual Therapy, Neuromuscular Re-education, Therapeutic Activity, Therapeutic Exercise    Long Term Goals     PT Goal 1  Goal Identifier: STG-1  Goal Description: Patient will possess functional pain-free cervical ROM to allow her to drive safely.  Rationale: to maximize safety and independence with performance of ADLs and functional tasks  Goal Progress: New  Target Date:  03/22/24  PT Goal 2  Goal Identifier: STG-2  Goal Description: Patient will be able to drive for 30 minutes without pain to allow her to commute to and from work.  Rationale: to maximize safety and independence with performance of ADLs and functional tasks  Goal Progress: New  PT Goal 3  Goal Identifier: LTG-1  Goal Description: Patient will possess a custom HEP to allow her to self-manage and prevent future recurrence.  Rationale: to maximize safety and independence with performance of ADLs and functional tasks  Goal Progress: New  Target Date: 04/19/24      Frequency of Treatment: 1-2x/week  Duration of Treatment: 12 weeks    Education Assessment:   Learner/Method: Patient;Listening    Risks and benefits of evaluation/treatment have been explained.   Patient/Family/caregiver agrees with Plan of Care.     Evaluation Time:     PT Eval, Low Complexity Minutes (00917): 31     Signing Clinician: Red Worrell PT

## 2024-02-07 ENCOUNTER — MYC MEDICAL ADVICE (OUTPATIENT)
Dept: FAMILY MEDICINE | Facility: OTHER | Age: 64
End: 2024-02-07

## 2024-02-07 DIAGNOSIS — M54.6 BACK PAIN OF THORACOLUMBAR REGION: Primary | ICD-10-CM

## 2024-02-07 DIAGNOSIS — M54.50 BACK PAIN OF THORACOLUMBAR REGION: Primary | ICD-10-CM

## 2024-02-07 NOTE — TELEPHONE ENCOUNTER
Pt called and needs something stronger for her anxiety.  Pt is willing to come in and be see.  She is leaving in March for vacation.  Pt is willing to wait until PCP returns on 02/16/2024 for a response.  Pt will reach out if needing an appointment sooner.     Please advise.   If needing to be seen, okay to use a SDS?

## 2024-02-09 ENCOUNTER — THERAPY VISIT (OUTPATIENT)
Dept: PHYSICAL THERAPY | Facility: HOSPITAL | Age: 64
End: 2024-02-09
Attending: NURSE PRACTITIONER
Payer: COMMERCIAL

## 2024-02-09 DIAGNOSIS — M51.369 DDD (DEGENERATIVE DISC DISEASE), LUMBAR: Primary | ICD-10-CM

## 2024-02-09 PROCEDURE — 97035 APP MDLTY 1+ULTRASOUND EA 15: CPT | Mod: GP

## 2024-02-09 PROCEDURE — 97140 MANUAL THERAPY 1/> REGIONS: CPT | Mod: GP

## 2024-02-09 NOTE — PROGRESS NOTES
Dry Needling Intervention Note    Date: February 9, 2024  Complaint: Cervical Pain  Pain: 6  Progress: New    Patient was seen for a session of dry needling intervention.  Informed consent was obtained from the patient after reviewing indications, precautions and contraindications.      Patient position: Prone.  Patient was appropriately draped.  Therapist's hands were washed appropriately to ensure safety. The call light was placed within reach of the patient and the patient was instructed on use.    Dry Needling Protocol Applied: Yes - Pt prone and agreeable to treatment. 2-30 mm needles utilized in levator scap B posterior to anterior with superior direction; 2-30 mm needles utilized in upper trap B posterior to anterior with superior direction; Needles in L upper trap pushed out to 1/2 exposure. All needles locking with rotation at 1/4 turn. 3 minutes, twist and tent, 3 minutes and removal with no bleeding noted.     Electrical Stimulation: No       Upon completion of the treatment, the needles were removed with clean hands by the treating therapist and were disposed of immediately in a sharps container.  Skin was inspected and patient was notified of expected response to treatment with the likelihood some some redness and mild aching that should resolve within 24-48 hours.The patient had no adverse reactions to the treatment and was instructed to seek medical care if any of the following symptoms developed:    Shortness of breath on exertion  Increased breathing rate  Chest pain  A dry cough  Bluish discoloration of the skin  Excessive sweating    Patient reported understanding and the dry needling session was completed.        Red Worrell, JAVI, Cert. DN

## 2024-02-13 ENCOUNTER — MYC REFILL (OUTPATIENT)
Dept: FAMILY MEDICINE | Facility: OTHER | Age: 64
End: 2024-02-13

## 2024-02-13 DIAGNOSIS — G89.29 CHRONIC BILATERAL LOW BACK PAIN WITH LEFT-SIDED SCIATICA: ICD-10-CM

## 2024-02-13 DIAGNOSIS — M54.2 NECK PAIN: ICD-10-CM

## 2024-02-13 DIAGNOSIS — M54.42 CHRONIC BILATERAL LOW BACK PAIN WITH LEFT-SIDED SCIATICA: ICD-10-CM

## 2024-02-13 RX ORDER — KETOROLAC TROMETHAMINE 10 MG/1
10 TABLET, FILM COATED ORAL EVERY 6 HOURS PRN
Qty: 20 TABLET | Refills: 0 | Status: SHIPPED | OUTPATIENT
Start: 2024-02-13 | End: 2024-08-02

## 2024-02-13 NOTE — TELEPHONE ENCOUNTER
Ketorolac 10 mg       Last Written Prescription Date:  1/16/24  Last Fill Quantity: 20,   # refills: 0  Last Office Visit: 1/16/24  Future Office visit:       Routing refill request to provider for review/approval because:  Drug not on the [facility], P or [facility] refill protocol or controlled substance

## 2024-02-16 ENCOUNTER — MYC MEDICAL ADVICE (OUTPATIENT)
Dept: PHYSICAL THERAPY | Facility: HOSPITAL | Age: 64
End: 2024-02-16

## 2024-02-16 ENCOUNTER — THERAPY VISIT (OUTPATIENT)
Dept: PHYSICAL THERAPY | Facility: HOSPITAL | Age: 64
End: 2024-02-16
Attending: CHIROPRACTOR
Payer: COMMERCIAL

## 2024-02-16 DIAGNOSIS — M51.369 DDD (DEGENERATIVE DISC DISEASE), LUMBAR: Primary | ICD-10-CM

## 2024-02-16 PROCEDURE — 97140 MANUAL THERAPY 1/> REGIONS: CPT | Mod: GP

## 2024-02-19 ENCOUNTER — THERAPY VISIT (OUTPATIENT)
Dept: PHYSICAL THERAPY | Facility: HOSPITAL | Age: 64
End: 2024-02-19
Attending: NURSE PRACTITIONER
Payer: COMMERCIAL

## 2024-02-19 DIAGNOSIS — M51.369 DDD (DEGENERATIVE DISC DISEASE), LUMBAR: Primary | ICD-10-CM

## 2024-02-19 PROCEDURE — 97140 MANUAL THERAPY 1/> REGIONS: CPT | Mod: GP,CQ

## 2024-02-19 NOTE — PROGRESS NOTES
Dry Needling Intervention Note     Date: February 15, 2024  Complaint: Cervical Pain  Pain: 5  Progress: Progressing      Patient was seen for a session of dry needling intervention.  Informed consent was obtained from the patient after reviewing indications, precautions and contraindications.       Patient position: Prone.  Patient was appropriately draped.  Therapist's hands were washed appropriately to ensure safety. The call light was placed within reach of the patient and the patient was instructed on use.     Dry Needling Protocol Applied: Yes - Pt prone and agreeable to treatment. 2-30 mm needles utilized in levator scap B posterior to anterior with superior direction; 2-30 mm needles utilized in upper trap B posterior to anterior with superior direction; Needles in L upper trap pushed out to 1/2 exposure. All needles locking with rotation at 1/4 turn. 3 minutes, twist and tent, 3 minutes and removal with no bleeding noted.      Electrical Stimulation: No        Upon completion of the treatment, the needles were removed with clean hands by the treating therapist and were disposed of immediately in a sharps container.  Skin was inspected and patient was notified of expected response to treatment with the likelihood some some redness and mild aching that should resolve within 24-48 hours.The patient had no adverse reactions to the treatment and was instructed to seek medical care if any of the following symptoms developed:     Shortness of breath on exertion  Increased breathing rate  Chest pain  A dry cough  Bluish discoloration of the skin  Excessive sweating     Patient reported understanding and the dry needling session was completed.         Red Worrell, JAVI, Cert. DN

## 2024-02-19 NOTE — PROGRESS NOTES
Dry Needling Intervention Note     Date: February 19, 2024  Complaint: Cervical Pain  Pain: 5  Progress: Progressing      Patient was seen for a session of dry needling intervention.  Informed consent was obtained from the patient after reviewing indications, precautions and contraindications.       Patient position: Prone.  Patient was appropriately draped.  Therapist's hands were washed appropriately to ensure safety. The call light was placed within reach of the patient and the patient was instructed on use.     Dry Needling Protocol Applied: Yes - Pt prone and agreeable to treatment. 3-30 mm needles utilized in levator scap B posterior to anterior with superior direction; 3-30 mm needles utilized in upper trap B posterior to anterior with superior direction; 1-30mm needles in teres major B and 1-30 mm needle placed medial to lateral in rhomboid B; Needles in L upper trap pushed out to 1/2 exposure. All needles locking with rotation at 1/4 turn. 3 minutes, twist and tent, 3 minutes and removal with no bleeding noted.      Electrical Stimulation: No        Upon completion of the treatment, the needles were removed with clean hands by the treating therapist and were disposed of immediately in a sharps container.  Skin was inspected and patient was notified of expected response to treatment with the likelihood some some redness and mild aching that should resolve within 24-48 hours.The patient had no adverse reactions to the treatment and was instructed to seek medical care if any of the following symptoms developed:     Shortness of breath on exertion  Increased breathing rate  Chest pain  A dry cough  Bluish discoloration of the skin  Excessive sweating     Patient reported understanding and the dry needling session was completed.         Red Worrell, JAVI, Cert. DN

## 2024-02-20 NOTE — TELEPHONE ENCOUNTER
"Action February 20, 2024 11:37 AM MT   Action Taken Called rep Danielle will push over imaging STAT.         DIAGNOSIS: Back Pain - Upper and Lower Back   APPOINTMENT DATE: 02/22/2024   NOTES STATUS DETAILS   OFFICE NOTE from referring provider Internal 01/02/2024 - [facility] - [facility]   OFFICE NOTE from other specialist Internal  Care Everywhere [facility] Physical Therapy    [facility]    02/17/2021 - Serjio Sibley MD - [facility]    01/25/2018 - Horacio Chen MD - [facility] Ortho   DISCHARGE REPORT from the ER Care Everywhere 09/12/2020 - [facility] ED   OPERATIVE REPORT Care Everywhere 04/04/2006 - C6-C7 ACDF  (Operative report can be found in Care Everywhere under the \"Other Results\" Tab.)   LABS     MRI PACS Internal    [facility]:  06/30/2021 - L Spine   CT SCAN PACS Internal    [facility]:  05/23/2022, 10/23/2020 - Abd/Pel  09/12/2020 - Chest/Abd/Pel  09/12/2020 - L Spine   XRAYS (IMAGES & REPORTS) PACS Internal    [facility]:  02/18/2022 - Chest  01/24/2022 - Hip  02/17/2021 - C Spine     "

## 2024-02-21 ENCOUNTER — THERAPY VISIT (OUTPATIENT)
Dept: PHYSICAL THERAPY | Facility: HOSPITAL | Age: 64
End: 2024-02-21
Attending: NURSE PRACTITIONER
Payer: COMMERCIAL

## 2024-02-21 DIAGNOSIS — M51.369 DDD (DEGENERATIVE DISC DISEASE), LUMBAR: Primary | ICD-10-CM

## 2024-02-21 PROCEDURE — 97140 MANUAL THERAPY 1/> REGIONS: CPT | Mod: GP

## 2024-02-21 PROCEDURE — 97010 HOT OR COLD PACKS THERAPY: CPT | Mod: GP

## 2024-02-22 ENCOUNTER — PRE VISIT (OUTPATIENT)
Dept: ORTHOPEDICS | Facility: CLINIC | Age: 64
End: 2024-02-22

## 2024-02-22 ENCOUNTER — ANCILLARY PROCEDURE (OUTPATIENT)
Dept: GENERAL RADIOLOGY | Facility: CLINIC | Age: 64
End: 2024-02-22
Attending: ORTHOPAEDIC SURGERY
Payer: COMMERCIAL

## 2024-02-22 ENCOUNTER — OFFICE VISIT (OUTPATIENT)
Dept: ORTHOPEDICS | Facility: CLINIC | Age: 64
End: 2024-02-22
Payer: COMMERCIAL

## 2024-02-22 VITALS — WEIGHT: 213 LBS | BODY MASS INDEX: 32.28 KG/M2 | HEIGHT: 68 IN

## 2024-02-22 DIAGNOSIS — M54.6 BACK PAIN OF THORACOLUMBAR REGION: ICD-10-CM

## 2024-02-22 DIAGNOSIS — G89.29 CHRONIC LEFT-SIDED LOW BACK PAIN WITH LEFT-SIDED SCIATICA: Primary | ICD-10-CM

## 2024-02-22 DIAGNOSIS — M54.50 BACK PAIN OF THORACOLUMBAR REGION: ICD-10-CM

## 2024-02-22 DIAGNOSIS — M54.42 CHRONIC LEFT-SIDED LOW BACK PAIN WITH LEFT-SIDED SCIATICA: Primary | ICD-10-CM

## 2024-02-22 PROCEDURE — 72082 X-RAY EXAM ENTIRE SPI 2/3 VW: CPT | Performed by: STUDENT IN AN ORGANIZED HEALTH CARE EDUCATION/TRAINING PROGRAM

## 2024-02-22 PROCEDURE — 99204 OFFICE O/P NEW MOD 45 MIN: CPT | Mod: GC | Performed by: ORTHOPAEDIC SURGERY

## 2024-02-22 PROCEDURE — 77073 BONE LENGTH STUDIES: CPT | Performed by: STUDENT IN AN ORGANIZED HEALTH CARE EDUCATION/TRAINING PROGRAM

## 2024-02-22 NOTE — LETTER
2/22/2024         RE: Zaira Naranjo  [address] [place] [id number]        Dear Colleague,    Thank you for referring your patient, Zaira Naranjo, to the [facility] [place]. Please see a copy of my visit note below.    Spine Surgical Hx:  2000 - Left hemilaminectomy-diskectomy L5-S1.  2006 - ACDF with plate fixation C6-7.      In-Person Visit    REASON FOR CONSULTATION: Consult     REFERRING PHYSICIAN: No ref. provider found  PCP:Evy Diaz    History of Present Illness: 63-year-old female with a previous history of L5-S1 laminectomy and decompression in the year 2000 as well as a previous ACDF in the year 2006.    Presents today with recurrent mid to lower back pain as well as left-sided radicular symptoms and groin pain.  She states that this has been going on for several years, she has tried conservative measurements with physical therapy and over-the-counter medications but is not provided success.  She has had previous injections in her cervical spine but not a lumbar spine injection.  She states that it when she has previously had steroid injections she has had a negative reaction to the steroid.  She does not have any numbness in the lower extremities.    Back 60%, Leg 40%,  Left > Right  Worse: Rest  Better: Cooking, working, walking    Previous treatment:   2000 L5-S1 Microdiskectomy    Oswestry (TONO) Questionnaire        2/22/2024    12:49 PM   OSWESTRY DISABILITY INDEX   Count 8   Sum 17   Oswestry Score (%) 42.5 %        Neck Disability Index (NDI) Questionnaire        1/26/2024     8:24 AM   Neck Disability Index (NDI)   Neck Disability Index: Count 6   NDI: Total Score = SUM (points for all 10 findings) Incomplete   Neck Disability in Percent = (Total Score) / 50 * 100 Incomplete      Visual Analog Pain Scale  Back Pain Scale 0-10: 6 (low back pain going into hips)  Right leg pain: 1  Left leg pain: 7 (pain in groin and goes down leg)  Neck Pain Scale  "0-10: 7  Right arm pain: 0  Left arm pain: 0    PROMIS-10 Scores  Global Mental Health Score: (P) 15  Global Physical Health Score: (P) 10  PROMIS TOTAL - SUBSCORES: (P) 25    ROS:  A 12-point review of systems was completed and is negative except for otherwise noted above in the history of present illness.    Med Hx:  No past medical history on file.    Surg Hx:  No past surgical history on file.  C6-C7 ACDF with Iliac Crest Graft 2006 Dr. Reji Johnston    Allergies:  Allergies   Allergen Reactions    Cats     Other Environmental Allergy     Seasonal Allergies     Thimerosal (Thiomersal)     Hydromorphone Nausea    Ketorolac Tromethamine Nausea       Meds:  Current Outpatient Medications   Medication    albuterol (PROAIR HFA/PROVENTIL HFA/VENTOLIN HFA) 108 (90 Base) MCG/ACT inhaler    allopurinol (ZYLOPRIM) 100 MG tablet    amitriptyline (ELAVIL) 25 MG tablet    amLODIPine (NORVASC) 10 MG tablet    atorvastatin (LIPITOR) 20 MG tablet    cetirizine HCl (ZYRTEC ALLERGY) 10 MG CAPS    chlorthalidone (HYGROTON) 25 MG tablet    fluticasone (FLONASE) 50 MCG/ACT nasal spray    hydrOXYzine HCl (ATARAX) 25 MG tablet    ketorolac (TORADOL) 10 MG tablet    lisinopril (ZESTRIL) 40 MG tablet    metoprolol succinate ER (TOPROL XL) 100 MG 24 hr tablet    mupirocin (BACTROBAN) 2 % nasal ointment    olopatadine (PATANOL) 0.1 % ophthalmic solution    omeprazole (PRILOSEC) 20 MG DR capsule     No current facility-administered medications for this visit.     Fam Hx:  No family history on file.    P/S Hx:  Social History     Tobacco Use    Smoking status: Former     Types: Cigarettes     Quit date: 1995     Years since quittin.1     Passive exposure: Never    Smokeless tobacco: Never   Substance Use Topics    Alcohol use: Yes     Comment: ocassion     Physical Exam:  Very pleasant, healthy appearing, alert, oriented x 3, cooperative.  Normal mood and affect.  Not in cardiorespiratory distress.  Ht 1.727 m (5' 8\")   Wt " 96.6 kg (213 lb)   BMI 32.39 kg/m    Normal upright posture.    Normal gait without assistive device.  No antalgia / imbalance.  Able to walk on toes and on heels with ease.  Back: no deformity, no skin lesions or surgical scars.  Localizes pain at left low back, mid back, left hip, left groin, left leg    Neuro Exam:  Motor: 5/5 strength for all muscle groups in both LE's.  Sensory:  Intact to light touch in both LE's.   Reflexes:  (-) Babinski    Lower Extremity:  (-) atrophy / asymmetry.  Full painless passive knee and ankle motion.  Positive log roll left hip        Imaging:    XR EOS Full standing films today demonstrate diffuse degenerative change throughout the thoracolumbar spine.     MRI Lumbar spine 1/4/24 demonstrates:  L4/L5 annular bulging and mild spondylisthesis. Canal stenosis, disk osteophyte complex compressing left L5.   L5/S1 disc protrusion and BL S1 nerve compression from disk osteophyte complex. Evidence of prior left L5 laminectomy.    Impression:    Chronic lumbosacral back pain 2' to advanced spondylosis with disc collapse, vacuum disc signal and loss of segmental lordosis L5-S1.  Chronic left S1 radiculopathy.  History of left hemilaminectomy-diskectomy L5-S1 (2000).  Left hip pain 2' to degenerative joint disease, with positive hip impingement test.  Rule out left sacroiliac joint pain.    Plan:     We discussed with Zaira the multifactorial nature of her pain.  At this point it is clear that she has at least some combination of low back, SI joint, radicular, and left hip pain.  In order to better assess which of these is the predominant factor causing her disability and which can be intervened on first we recommend diagnostic injection to begin with.  We would first proceed with a left S1 transforaminal epidural steroid injection which will be both diagnostic and therapeutic.  We will follow-up with her virtually after this.  If this does not provide relief, the next step would be an  SI joint injection on the left side followed by thereafter if that does not succeed with a left hip injection.  Patient was in agreement to this plan.    - Left S1 TFESI.  If no relief, may consider Left SIJ injection vs Left hip intra-articular injection.    Virtual RTC after above, to review response and discuss further options.    Mian Barber MD PGY-4    Attestation:  I (Dr. Jack Dumont - Spine Surgeon) have personally evaluated patient with PGY-4 Elisabeth, and agree with findings and plan outlined in the note, which I also edited.  I discussed at length with the patient/family, explained the nature of spinal condition, and formulated workup and/or treatment plan together.  All questions were answered to the best of my ability and to patient's apparent satisfaction.     45 minutes spent on the date of the encounter doing chart review/review of outside records/review of test results/interpretation of tests/patient visit/documentation/discussion with other provider(s)/discussion with patient and family.    Jack Dumont MD    Orthopaedic Spine Surgery  Dept Orthopaedic Surgery, formerly [facility]  [phone] office, [phone] pager  [email or web address]

## 2024-02-22 NOTE — PROGRESS NOTES
02/21/24 0500   Appointment Info   Signing clinician's name / credentials Red Worrell PT   Total/Authorized Visits 365   Visits Used 5   Medical Diagnosis DDD (degenerative disc disease), lumbar   PT Tx Diagnosis DDD (degenerative disc disease), lumbar   Progress Note/Certification   Onset of illness/injury or Date of Surgery 01/26/24   Therapy Frequency 1-2x/week   Predicted Duration 12 weeks   Progress Note Due Date 02/26/24       Present No   GOALS   PT Goals 2;3   PT Goal 1   Goal Identifier STG-1   Goal Description Patient will possess functional pain-free cervical ROM to allow her to drive safely.   Rationale to maximize safety and independence with performance of ADLs and functional tasks   Goal Progress Progressing   Target Date 03/22/24   PT Goal 2   Goal Identifier STG-2   Goal Description Patient will be able to drive for 30 minutes without pain to allow her to commute to and from work.   Rationale to maximize safety and independence with performance of ADLs and functional tasks   Goal Progress Progressing   Target Date 03/22/24   PT Goal 3   Goal Identifier LTG-1   Goal Description Patient will possess a custom HEP to allow her to self-manage and prevent future recurrence.   Rationale to maximize safety and independence with performance of ADLs and functional tasks   Goal Progress Progressing   Target Date 04/19/24   Subjective Report   Subjective Report Patient arrived to therapy today stating she is doing fair. She notes she was feeling better after her last session but notes she stayed at work till 7pm and notes she was sore again by the end of the day. She states she is planning to go to [place] in March for a few weeks. She states she is agreeable to work with the PT today.   PT Modalities   PT Modalities Ultrasound;Hot Packs   Hot Pack   Heat -Duration 10 min   Hot Pack Minutes (50014) 10   Patient Response/Progress New   Treatment Detail Supine (completed to thoracic  spine and cervical spine)   Positioning supine   Location Thoracic and Cervical Spine   Ultrasound   Treatment Detail Completed to cervical and B shoulders (posterior)   Ultrasound -Type (does not include 3-5 min prep/cleanup time) Continuous   Intensity 1.5   Duration (does not include the 3-5 min set up/clean up time) 8 min   Frequency 1 MHz   Location B shoulder (Posterior)   Positioning sitting   Medication US Gel   Patient Response/Progress New   Treatment Interventions (PT)   Interventions Manual Therapy;Therapeutic Procedure/Exercise   Manual Therapy   Manual Therapy: Mobilization, MFR, MLD, friction massage minutes (06875) 43   Manual Therapy 1 - Details Astym completed to cervical/B upper trap with use of coconut oil; STM completed to assess tissue integrity; Plan to apply the hot pack for 10 minutes prior to her therapy time at next session; DN completed today - please see notes for details   Education   Learner/Method Patient;Listening   Plan   Plan for next session Plan to progress according to how she tolerated the previous session; wait to hear from Shady Meyers if she is able to increase to 2x/week   Total Session Time   Timed Code Treatment Minutes 43   Total Treatment Time (sum of timed and untimed services) 53         PLAN  Continue therapy per current plan of care.    Beginning/End Dates of Progress Note Reporting Period:  01/26/204  to 02/21/2024    Referring Provider:  Shady Meyers

## 2024-02-22 NOTE — PROGRESS NOTES
Spine Surgical Hx:  2000 - Left hemilaminectomy-diskectomy L5-S1.  2006 - ACDF with plate fixation C6-7.      In-Person Visit    REASON FOR CONSULTATION: Consult     REFERRING PHYSICIAN: No ref. provider found  PCP:Evy Diaz    History of Present Illness: 63-year-old female with a previous history of L5-S1 laminectomy and decompression in the year 2000 as well as a previous ACDF in the year 2006.    Presents today with recurrent mid to lower back pain as well as left-sided radicular symptoms and groin pain.  She states that this has been going on for several years, she has tried conservative measurements with physical therapy and over-the-counter medications but is not provided success.  She has had previous injections in her cervical spine but not a lumbar spine injection.  She states that it when she has previously had steroid injections she has had a negative reaction to the steroid.  She does not have any numbness in the lower extremities.    Back 60%, Leg 40%,  Left > Right  Worse: Rest  Better: Cooking, working, walking    Previous treatment:   2000 L5-S1 Microdiskectomy    Oswestry (TONO) Questionnaire        2/22/2024    12:49 PM   OSWESTRY DISABILITY INDEX   Count 8   Sum 17   Oswestry Score (%) 42.5 %        Neck Disability Index (NDI) Questionnaire        1/26/2024     8:24 AM   Neck Disability Index (NDI)   Neck Disability Index: Count 6   NDI: Total Score = SUM (points for all 10 findings) Incomplete   Neck Disability in Percent = (Total Score) / 50 * 100 Incomplete      Visual Analog Pain Scale  Back Pain Scale 0-10: 6 (low back pain going into hips)  Right leg pain: 1  Left leg pain: 7 (pain in groin and goes down leg)  Neck Pain Scale 0-10: 7  Right arm pain: 0  Left arm pain: 0    PROMIS-10 Scores  Global Mental Health Score: (P) 15  Global Physical Health Score: (P) 10  PROMIS TOTAL - SUBSCORES: (P) 25    ROS:  A 12-point review of systems was completed and is negative except for  "otherwise noted above in the history of present illness.    Med Hx:  No past medical history on file.    Surg Hx:  No past surgical history on file.  C6-C7 ACDF with Iliac Crest Graft 2006 Dr. Reji Johnston    Allergies:  Allergies   Allergen Reactions    Cats     Other Environmental Allergy     Seasonal Allergies     Thimerosal (Thiomersal)     Hydromorphone Nausea    Ketorolac Tromethamine Nausea       Meds:  Current Outpatient Medications   Medication    albuterol (PROAIR HFA/PROVENTIL HFA/VENTOLIN HFA) 108 (90 Base) MCG/ACT inhaler    allopurinol (ZYLOPRIM) 100 MG tablet    amitriptyline (ELAVIL) 25 MG tablet    amLODIPine (NORVASC) 10 MG tablet    atorvastatin (LIPITOR) 20 MG tablet    cetirizine HCl (ZYRTEC ALLERGY) 10 MG CAPS    chlorthalidone (HYGROTON) 25 MG tablet    fluticasone (FLONASE) 50 MCG/ACT nasal spray    hydrOXYzine HCl (ATARAX) 25 MG tablet    ketorolac (TORADOL) 10 MG tablet    lisinopril (ZESTRIL) 40 MG tablet    metoprolol succinate ER (TOPROL XL) 100 MG 24 hr tablet    mupirocin (BACTROBAN) 2 % nasal ointment    olopatadine (PATANOL) 0.1 % ophthalmic solution    omeprazole (PRILOSEC) 20 MG DR capsule     No current facility-administered medications for this visit.     Fam Hx:  No family history on file.    P/S Hx:  Social History     Tobacco Use    Smoking status: Former     Types: Cigarettes     Quit date: 1995     Years since quittin.1     Passive exposure: Never    Smokeless tobacco: Never   Substance Use Topics    Alcohol use: Yes     Comment: ocassion     Physical Exam:  Very pleasant, healthy appearing, alert, oriented x 3, cooperative.  Normal mood and affect.  Not in cardiorespiratory distress.  Ht 1.727 m (5' 8\")   Wt 96.6 kg (213 lb)   BMI 32.39 kg/m    Normal upright posture.    Normal gait without assistive device.  No antalgia / imbalance.  Able to walk on toes and on heels with ease.  Back: no deformity, no skin lesions or surgical scars.  Localizes pain at " left low back, mid back, left hip, left groin, left leg    Neuro Exam:  Motor: 5/5 strength for all muscle groups in both LE's.  Sensory:  Intact to light touch in both LE's.   Reflexes:  (-) Babinski    Lower Extremity:  (-) atrophy / asymmetry.  Full painless passive knee and ankle motion.  Positive log roll left hip        Imaging:    XR EOS Full standing films today demonstrate diffuse degenerative change throughout the thoracolumbar spine.     MRI Lumbar spine 1/4/24 demonstrates:  L4/L5 annular bulging and mild spondylisthesis. Canal stenosis, disk osteophyte complex compressing left L5.   L5/S1 disc protrusion and BL S1 nerve compression from disk osteophyte complex. Evidence of prior left L5 laminectomy.    Impression:    Chronic lumbosacral back pain 2' to advanced spondylosis with disc collapse, vacuum disc signal and loss of segmental lordosis L5-S1.  Chronic left S1 radiculopathy.  History of left hemilaminectomy-diskectomy L5-S1 (2000).  Left hip pain 2' to degenerative joint disease, with positive hip impingement test.  Rule out left sacroiliac joint pain.    Plan:     We discussed with Zaira the multifactorial nature of her pain.  At this point it is clear that she has at least some combination of low back, SI joint, radicular, and left hip pain.  In order to better assess which of these is the predominant factor causing her disability and which can be intervened on first we recommend diagnostic injection to begin with.  We would first proceed with a left S1 transforaminal epidural steroid injection which will be both diagnostic and therapeutic.  We will follow-up with her virtually after this.  If this does not provide relief, the next step would be an SI joint injection on the left side followed by thereafter if that does not succeed with a left hip injection.  Patient was in agreement to this plan.    - Left S1 TFESI.  If no relief, may consider Left SIJ injection vs Left hip intra-articular  injection.    Virtual RTC after above, to review response and discuss further options.    Mian Barber MD PGY-4    Attestation:  I (Dr. Jack Dumont - Spine Surgeon) have personally evaluated patient with PGY-4 Elisabeth, and agree with findings and plan outlined in the note, which I also edited.  I discussed at length with the patient/family, explained the nature of spinal condition, and formulated workup and/or treatment plan together.  All questions were answered to the best of my ability and to patient's apparent satisfaction.     45 minutes spent on the date of the encounter doing chart review/review of outside records/review of test results/interpretation of tests/patient visit/documentation/discussion with other provider(s)/discussion with patient and family.    Jack Dumont MD    Orthopaedic Spine Surgery  Dept Orthopaedic Surgery, [facility]  [phone] office, [phone] pager  [email or web address]

## 2024-02-24 LAB — NONINV COLON CA DNA+OCC BLD SCRN STL QL: NEGATIVE

## 2024-02-26 ENCOUNTER — THERAPY VISIT (OUTPATIENT)
Dept: PHYSICAL THERAPY | Facility: HOSPITAL | Age: 64
End: 2024-02-26
Attending: CHIROPRACTOR
Payer: COMMERCIAL

## 2024-02-26 DIAGNOSIS — M51.369 DDD (DEGENERATIVE DISC DISEASE), LUMBAR: Primary | ICD-10-CM

## 2024-02-26 DIAGNOSIS — M54.2 NECK PAIN: ICD-10-CM

## 2024-02-26 DIAGNOSIS — G89.29 CHRONIC BILATERAL LOW BACK PAIN WITH LEFT-SIDED SCIATICA: ICD-10-CM

## 2024-02-26 DIAGNOSIS — M54.42 CHRONIC BILATERAL LOW BACK PAIN WITH LEFT-SIDED SCIATICA: ICD-10-CM

## 2024-02-26 PROCEDURE — 97010 HOT OR COLD PACKS THERAPY: CPT | Mod: GP

## 2024-02-26 PROCEDURE — 97140 MANUAL THERAPY 1/> REGIONS: CPT | Mod: GP

## 2024-02-27 ENCOUNTER — HOSPITAL ENCOUNTER (OUTPATIENT)
Dept: GENERAL RADIOLOGY | Facility: CLINIC | Age: 64
Discharge: HOME | End: 2024-02-27
Payer: COMMERCIAL

## 2024-02-27 ENCOUNTER — HOSPITAL ENCOUNTER (OUTPATIENT)
Facility: CLINIC | Age: 64
Discharge: HOME | End: 2024-02-27
Admitting: PHYSICIAN ASSISTANT
Payer: COMMERCIAL

## 2024-02-27 VITALS
DIASTOLIC BLOOD PRESSURE: 71 MMHG | SYSTOLIC BLOOD PRESSURE: 118 MMHG | HEART RATE: 75 BPM | OXYGEN SATURATION: 98 % | RESPIRATION RATE: 16 BRPM

## 2024-02-27 VITALS — HEART RATE: 78 BPM | OXYGEN SATURATION: 97 % | SYSTOLIC BLOOD PRESSURE: 117 MMHG | DIASTOLIC BLOOD PRESSURE: 69 MMHG

## 2024-02-27 DIAGNOSIS — M54.42 CHRONIC LEFT-SIDED LOW BACK PAIN WITH LEFT-SIDED SCIATICA: ICD-10-CM

## 2024-02-27 DIAGNOSIS — G89.29 CHRONIC LEFT-SIDED LOW BACK PAIN WITH LEFT-SIDED SCIATICA: ICD-10-CM

## 2024-02-27 PROCEDURE — 255N000002 HC RX 255 OP 636: Performed by: PHYSICIAN ASSISTANT

## 2024-02-27 PROCEDURE — 64483 NJX AA&/STRD TFRM EPI L/S 1: CPT

## 2024-02-27 PROCEDURE — 999N000154 HC STATISTIC RADIOLOGY XRAY, US, CT, MAR, NM

## 2024-02-27 PROCEDURE — 250N000011 HC RX IP 250 OP 636: Performed by: PHYSICIAN ASSISTANT

## 2024-02-27 PROCEDURE — 250N000009 HC RX 250: Performed by: PHYSICIAN ASSISTANT

## 2024-02-27 RX ORDER — NICOTINE POLACRILEX 4 MG
15-30 LOZENGE BUCCAL
Status: DISCONTINUED | OUTPATIENT
Start: 2024-02-27 | End: 2024-02-28 | Stop reason: HOSPADM

## 2024-02-27 RX ORDER — LIDOCAINE HYDROCHLORIDE 10 MG/ML
5 INJECTION, SOLUTION EPIDURAL; INFILTRATION; INTRACAUDAL; PERINEURAL ONCE
Status: COMPLETED | OUTPATIENT
Start: 2024-02-27 | End: 2024-02-27

## 2024-02-27 RX ORDER — TRIAMCINOLONE ACETONIDE 40 MG/ML
40 INJECTION, SUSPENSION INTRA-ARTICULAR; INTRAMUSCULAR ONCE
Status: DISCONTINUED | OUTPATIENT
Start: 2024-02-27 | End: 2024-02-27

## 2024-02-27 RX ORDER — DEXTROSE MONOHYDRATE 25 G/50ML
25-50 INJECTION, SOLUTION INTRAVENOUS
Status: DISCONTINUED | OUTPATIENT
Start: 2024-02-27 | End: 2024-02-28 | Stop reason: HOSPADM

## 2024-02-27 RX ORDER — IOPAMIDOL 408 MG/ML
10 INJECTION, SOLUTION INTRATHECAL ONCE
Status: COMPLETED | OUTPATIENT
Start: 2024-02-27 | End: 2024-02-27

## 2024-02-27 RX ORDER — DEXAMETHASONE SODIUM PHOSPHATE 10 MG/ML
20 INJECTION, SOLUTION INTRAMUSCULAR; INTRAVENOUS ONCE
Status: COMPLETED | OUTPATIENT
Start: 2024-02-27 | End: 2024-02-27

## 2024-02-27 RX ADMIN — DEXAMETHASONE SODIUM PHOSPHATE 20 MG: 10 INJECTION INTRAMUSCULAR; INTRAVENOUS at 14:33

## 2024-02-27 RX ADMIN — IOPAMIDOL 1 ML: 408 INJECTION, SOLUTION INTRATHECAL at 14:33

## 2024-02-27 RX ADMIN — LIDOCAINE HYDROCHLORIDE 5 ML: 10 INJECTION, SOLUTION EPIDURAL; INFILTRATION; INTRACAUDAL; PERINEURAL at 14:28

## 2024-02-27 NOTE — PROGRESS NOTES
Care Suites Discharge Nursing Note    Patient Information  Name: Zaira Naranjo  Age: 63 year old    Discharge Education:  Discharge instructions reviewed: Yes  Additional education/resources provided: none  Patient/patient representative verbalizes understanding: Yes  Patient discharging on new medications: No  Medication education completed: N/A    Discharge Plans:   Discharge location: home  Discharge ride contacted: Yes  Approximate discharge time: 1505    Discharge Criteria:  Discharge criteria met and vital signs stable: Yes    Patient Belongs:  Patient belongings returned to patient: Yes    Monisha Mann RN

## 2024-02-27 NOTE — TELEPHONE ENCOUNTER
Toradol      Last Written Prescription Date:  2/13/24  Last Fill Quantity: 20,   # refills: 0  Last Office Visit: 1/16/24  Future Office visit:       Routing refill request to provider for review/approval because:

## 2024-02-27 NOTE — DISCHARGE INSTRUCTIONS
Steroid Injection Discharge Instructions     After you go home:    You may resume your normal diet.    Care of Puncture Site:    If you have a bandaid on your puncture site, you may remove it the next morning  You may shower tomorrow  No bath tubs, whirlpools or swimming pool for at least 48 hours  Use ice packs as needed for discomfort     Activity:    Minimize your activity today. You may gradually resume your normal activity as tolerated  Avoid vigorous or strenuous activity until your symptoms improve or as directed by your doctor  Do NOT drive a vehicle for a few hours after the injection - or longer if you develop numbness in your arm or leg    Medicines:    You may resume all medications, including blood thinners  Resume your Warfarin/Coumadin at your regular dose today. Follow up with your provider to have your INR rechecked  Resume your Platelet Inhibitors and Aspirin tomorrow at your regular dose  For minor discomfort, you may take Acetaminophen (Tylenol) or Ibuprofen (Advil)    Pain:     You may experience increased or different pain over the next 24-48 hours  For the next 48 hrs - you may use ice packs for discomfort     Call your primary care doctor if:    You have severe pain that does not improve with pain medication  You have chills or a fever greater than 101 F (38 C)  The site is red, swollen, hot or tender  Increase in pain, weakness or numbness  New problems with your bowel or bladder  Any questions or concerns    What to watch for:    It can be normal to have some bruising or slight swelling at the puncture site.   After the procedure, you may have some new weakness or numbness down your arm/leg from the numbing medicine. This should resolve in a few hours.   You may feel some temporary relief from the numbing medicine, but that will wear off within a few hours.  Your symptoms may return to pre procedure level, or can even be worse for the first 1-2 days.  For many people, the steroid begins to  provide some relief within 2-3 days, but it can take up to 2 weeks to obtain the full results.  Some people will get lasting relief from a single injection. Others may require up to 3 injections to get results. If you have more than one steroid injection, they should be given 2 weeks apart.  If you have no improvement in your symptoms after two weeks, please contact the doctor who ordered this procedure to discuss the next steps.  Side effects of your steroid injection are mild and will go away in 2-3 days  Insomnia  Irritability  Flushed face  Water retention  Restlessness  Difficulty sleeping  Increased appetite  Increased blood sugar  If you are diabetic, monitor your blood sugar closely. Contact the provider who manages your diabetes to help you control your blood sugar if needed.    If you have questions or concerns call:                  [facility] Dept @ [phone]                                    between 8am-4:30pm Mon-Fri    If you have urgent questions outside of these normal business hours, please contact the [facility] on call doctor @ [phone]      The provider who performed your procedure was __Fan Head RN_____.

## 2024-02-29 NOTE — PROGRESS NOTES
Dry Needling Intervention Note     Date: February 26, 2024  Complaint: Cervical Pain  Pain: 5  Progress: Progressing      Patient was seen for a session of dry needling intervention.  Informed consent was obtained from the patient after reviewing indications, precautions and contraindications.       Patient position: Prone.  Patient was appropriately draped.  Therapist's hands were washed appropriately to ensure safety. The call light was placed within reach of the patient and the patient was instructed on use.     Dry Needling Protocol Applied: Yes - Pt prone and agreeable to treatment. 2-30 mm needles utilized in levator scap B posterior to anterior with superior direction; 2-30 mm needles utilized in upper trap B posterior to anterior with superior direction; Needles in L upper trap pushed out to 1/2 exposure. All needles locking with rotation at 1/4 turn. 3 minutes, twist and tent, 3 minutes and removal with no bleeding noted.      Electrical Stimulation: No        Upon completion of the treatment, the needles were removed with clean hands by the treating therapist and were disposed of immediately in a sharps container.  Skin was inspected and patient was notified of expected response to treatment with the likelihood some some redness and mild aching that should resolve within 24-48 hours.The patient had no adverse reactions to the treatment and was instructed to seek medical care if any of the following symptoms developed:     Shortness of breath on exertion  Increased breathing rate  Chest pain  A dry cough  Bluish discoloration of the skin  Excessive sweating     Patient reported understanding and the dry needling session was completed.         Red Worrell, JAVI, Cert. DN

## 2024-03-01 ENCOUNTER — THERAPY VISIT (OUTPATIENT)
Dept: PHYSICAL THERAPY | Facility: HOSPITAL | Age: 64
End: 2024-03-01
Attending: CHIROPRACTOR
Payer: COMMERCIAL

## 2024-03-01 DIAGNOSIS — M51.369 DDD (DEGENERATIVE DISC DISEASE), LUMBAR: Primary | ICD-10-CM

## 2024-03-01 PROCEDURE — 97140 MANUAL THERAPY 1/> REGIONS: CPT | Mod: GP

## 2024-03-01 PROCEDURE — 97035 APP MDLTY 1+ULTRASOUND EA 15: CPT | Mod: GP

## 2024-03-01 PROCEDURE — 97010 HOT OR COLD PACKS THERAPY: CPT | Mod: GP

## 2024-03-01 RX ORDER — KETOROLAC TROMETHAMINE 10 MG/1
1 TABLET, FILM COATED ORAL EVERY 6 HOURS PRN
Qty: 20 TABLET | Refills: 0 | OUTPATIENT
Start: 2024-03-01

## 2024-03-01 NOTE — TELEPHONE ENCOUNTER
Evy Diaz APRN CNP   to  Family Care Team 1   NM      3/1/24  8:23 AM  This medication is very hard on the kidneys and should not have more than 20 in a month.

## 2024-03-01 NOTE — TELEPHONE ENCOUNTER
Attempted to call patient. Left message for patient to call clinic back to informed of message below.

## 2024-03-10 ENCOUNTER — HEALTH MAINTENANCE LETTER (OUTPATIENT)
Age: 64
End: 2024-03-10

## 2024-03-10 NOTE — PROGRESS NOTES
Dry Needling Intervention Note     Date: March 1, 2024  Complaint: Cervical Pain  Pain: 5  Progress: Progressing      Patient was seen for a session of dry needling intervention.  Informed consent was obtained from the patient after reviewing indications, precautions and contraindications.       Patient position: Prone.  Patient was appropriately draped.  Therapist's hands were washed appropriately to ensure safety. The call light was placed within reach of the patient and the patient was instructed on use.     Dry Needling Protocol Applied: Yes - Pt prone and agreeable to treatment. 2-30 mm needles utilized in levator scap B posterior to anterior with superior direction; 2-30 mm needles utilized in upper trap B posterior to anterior with superior direction; Needles in L upper trap pushed out to 1/2 exposure. All needles locking with rotation at 1/4 turn. 3 minutes, twist and tent, 3 minutes and removal with no bleeding noted.      Electrical Stimulation: No        Upon completion of the treatment, the needles were removed with clean hands by the treating therapist and were disposed of immediately in a sharps container.  Skin was inspected and patient was notified of expected response to treatment with the likelihood some some redness and mild aching that should resolve within 24-48 hours.The patient had no adverse reactions to the treatment and was instructed to seek medical care if any of the following symptoms developed:     Shortness of breath on exertion  Increased breathing rate  Chest pain  A dry cough  Bluish discoloration of the skin  Excessive sweating     Patient reported understanding and the dry needling session was completed.         Red Worrell, JAVI, Cert. DN  
09-Apr-2019 03:54

## 2024-03-18 NOTE — TELEPHONE ENCOUNTER
Pt called and is currently on vacation and does not currently need anything for her work stress at this time.  Pt will reach out if she changes her mind.   Negative

## 2024-03-26 ENCOUNTER — VIRTUAL VISIT (OUTPATIENT)
Dept: ORTHOPEDICS | Facility: CLINIC | Age: 64
End: 2024-03-26
Payer: COMMERCIAL

## 2024-03-26 VITALS — BODY MASS INDEX: 31.67 KG/M2 | WEIGHT: 209 LBS | HEIGHT: 68 IN

## 2024-03-26 DIAGNOSIS — M47.27 LUMBOSACRAL SPONDYLOSIS WITH RADICULOPATHY: Primary | ICD-10-CM

## 2024-03-26 DIAGNOSIS — M40.37 FLATBACK SYNDROME OF LUMBOSACRAL REGION: ICD-10-CM

## 2024-03-26 PROCEDURE — 99215 OFFICE O/P EST HI 40 MIN: CPT | Mod: 95 | Performed by: ORTHOPAEDIC SURGERY

## 2024-03-26 ASSESSMENT — PAIN SCALES - GENERAL: PAINLEVEL: MILD PAIN (3)

## 2024-03-26 NOTE — PROGRESS NOTES
"Spine Surgical Hx:  2000 - Left hemilaminectomy-diskectomy L5-S1 ([facility], Dhruv, [place]).  2006 - ACDF with plate fixation C6-7; ant ICBG harvest.      VIRTUAL VISIT:  Patient is evaluated today via billable virtual visit.    The patient has been notified of following:   \"This virtual visit will be conducted via a call between you and your physician/provider. We have found that certain health care needs can be provided without the need for an in-person physical exam.  This service lets us provide the care you need with a virtual conversation.  If a prescription is necessary we can send it directly to your pharmacy.  If lab work is needed we can place an order for that and you can then stop by our lab to have the test done at a later time.  If during the course of the call the physician/provider feels a virtual visit is not appropriate, you will not be charged for this service.\"     Physician has received verbal consent for a Virtual Visit from the patient.  Platform used:  [email or web address] Video  Time:  1:56pm to 2:26pm  Originating Location (pt. Location): Home  Distant Location (provider location):  [facility] [place]     Chief Complaint   Patient presents with    RECHECK       Last Visit Date: 2/22/24  Previous Impression:  Chronic lumbosacral back pain 2' to advanced spondylosis with disc collapse, vacuum disc signal and loss of segmental lordosis L5-S1.  Chronic left S1 radiculopathy.  History of left hemilaminectomy-diskectomy L5-S1 (2000).  Left hip pain 2' to degenerative joint disease, with positive hip impingement test.  Rule out left sacroiliac joint pain.  Previous Plan:  - Left S1 TFESI.  If no relief, may consider Left SIJ injection vs Left hip intra-articular injection.  Virtual RTC after above, to review response and discuss further options.      S>  63 year old female, here to review injection response, and discuss further options.    2/27/24 - Left S1 TFESI (Fan Head PA-C).  " Per report, pain completely relieved (back from 7/10 to 0; right leg from 3/10 to 0; left leg from 7/10 to 0).  Per patient, definitely hit the right spot.  She felt a fullness as soon as she got the injection, in [place] area.  They flew out to [place] on 3/2/24; she usually walks a lot in the beach when they go.  However, she thinks by that time, the effect had been wearing off.  This time, she was able to walk about  0.25 mile, but had to stop because the pain was getting really bad.    Over the past year, had been doing evelyn and aqua yoga.  Is in the Spine Program at [facility] PT department; doing formal PT for her neck, but they have also been doing things for her low back.  Per patient, she was told there was nothing they could do for her low back.    PMHx:  Pulmonary sarcoidosis - dxed 2019.  Considered inactive.  Recent scan showed no change in nodules.  Per patient, she had recent cardiac calcium score of 170.  No prior heart attaches or acute events.    Works for  [employer].      Oswestry (TONO) Questionnaire        3/26/2024     1:43 PM   OSWESTRY DISABILITY INDEX   Count 10   Sum 28   Oswestry Score (%) 56 %      TONO 2/22/24 42.5%  TONO 3/26/24 56%      Neck Disability Index (NDI) Questionnaire        1/26/2024     8:24 AM   Neck Disability Index (NDI)   Neck Disability Index: Count 6   NDI: Total Score = SUM (points for all 10 findings) Incomplete   Neck Disability in Percent = (Total Score) / 50 * 100 Incomplete       PROMIS-10 Scores  Global Mental Health Score: (P) 12  Global Physical Health Score: (P) 10  PROMIS TOTAL - SUBSCORES: (P) 22    Physical Examination:    This was a virtual visit, so very limited exam could be performed.  Patient seemed alert, oriented x 3, cooperative, with coherent speech, and not in distress.  Able to respond to questions appropriately and follow instructions.    Imaging:    Reviewed EOS full body AP lateral standing x-rays from 2/22/2024.  Advanced  spondylosis with near total disc collapse L5-S1.  Trace spondylolisthesis L4-5.  Very mild degenerative scoliosis.  Sagittal measurements:  LL 33, ideal 46  L4-S1 26, ideal 31  L5-S1 18, ideal 18  PI 36  PT 18  SVA +0.3cm  TPA 12  GT 16  T10-L2 kyph 2    Assessment:    1.  Chronic lumbosacral back pain 2' to advanced spondylosis with disc collapse, vacuum disc signal and loss of segmental lordosis L5-S1.  2.  Chronic left S1 radiculopathy, with positive response to left S1 TFESI (2/27/2024, Fan Head PA-C)..  3.  Upper and lower lumbar flatback deformity (L4-S1 = 26 degrees, ideal 31; LL = 33 degrees, ideal 4-6) without significant sagittal imbalance (SVA = +0.3 cm).  4.  History of left hemilaminectomy-diskectomy L5-S1 (2000).  5.  Left hip pain 2' to degenerative joint disease, with positive hip impingement test.  6.  Rule out left sacroiliac joint pain.  7.  History of significant postoperative nausea and vomiting.    Plan:    Had good long discussion with patient.  We discussed her spinal condition.  Given her injection response, I believe the left S1 nerve root is mediating the significant amount of her pain symptoms.  She had previous decompression (discectomy) at L5-S1.  MRI shows advanced spondylosis with near total disc collapse, and disc osteophyte complexes at L5-S1, with likely impingement of left greater than right S1 nerve roots.  I discussed that this is a degenerative nonlife threatening condition.  At this point, patient feels she has exhausted nonoperative treatment, continues to have significant disabling symptoms which limit her quality of life.  We discussed potential surgical treatment.  I would recommend doing a fusion in addition to revision decompression, given the advanced spondylosis and near-total disc collapse.  In order to provide more room for the nerves, we would also need to jacked up the disc space.  She also has lower lumbar flatback deformity, which we could also address with  the fusion.  We discussed the rationale, pros and cons, risks, benefits and alternatives.  We also discussed anticipated postsurgical recovery and restrictions.  Patient elects to proceed.  We discussed bone graft options.  I recommend use of extra small kit infuse BMP, local autograft and crushed cancellous allograft (final graft technologies, [employer]).  Patient expressed agreement.    The patient does not have any untreated, underlying mental health conditions or issues which are a major contributor to their chronic pain.  Non-smoker.    - Case request: TLIF-SPO L5-S1; use of Extra-small kit Infuse BMP and allograft.  - PAC referral.  Of note, patient mentions she has significant problems with postoperative nausea and vomiting.  This almost makes her have second thoughts about having another surgery.  I told her to make sure she mentions this at her PAC visit, and our anesthesiology team will make sure that she is given appropriate medications to prevent nausea and vomiting.  - Lumbar CT - for surgical planning.    40 minutes spent on the date of the encounter doing chart review/review of outside records/review of test results/interpretation of tests/patient visit/documentation/discussion with other provider(s)/discussion with patient and family.    Jack Dumont MD    Orthopaedic Spine Surgery  Dept Orthopaedic Surgery, [facility]  [phone] office, [phone] pager  [email or web address]    Addendum 7/17/2024:  I was just informed that insurance denied authorization of use of BMP, even though we only requested for extra small infuse.  Thus, we would have no choice but to perform the surgery without BMP.  We will plan to use local autograft and crushed cancellous allograft.

## 2024-03-26 NOTE — NURSING NOTE
No other vitals to report today      Is the patient currently in the state of [place]? YES    Visit mode:VIDEO    If the visit is dropped, the patient can be reconnected by: VIDEO VISIT: Text to cell phone:   Telephone Information:   Mobile [phone]       Will anyone else be joining the visit? NO  (If patient encounters technical issues they should call [phone] :[phone])    How would you like to obtain your AVS? MyChart    Are changes needed to the allergy or medication list? No    Patient declined individual allergy and medication review by support staff because they deny any changes and state that all information remains accurate since last reviewed/verified.     Reason for visit: FIDE Lawrence MA VVF

## 2024-03-26 NOTE — LETTER
"    3/26/2024         RE: Zaira Naranjo  [address] [place] [id number]        Dear Colleague,    Thank you for referring your patient, Zaira Naranjo, to the [facility]. Please see a copy of my visit note below.    Spine Surgical Hx:  2000 - Left hemilaminectomy-diskectomy L5-S1 ([facility], Dhruv, [place]).  2006 - ACDF with plate fixation C6-7; ant ICBG harvest.      VIRTUAL VISIT:  Patient is evaluated today via billable virtual visit.    The patient has been notified of following:   \"This virtual visit will be conducted via a call between you and your physician/provider. We have found that certain health care needs can be provided without the need for an in-person physical exam.  This service lets us provide the care you need with a virtual conversation.  If a prescription is necessary we can send it directly to your pharmacy.  If lab work is needed we can place an order for that and you can then stop by our lab to have the test done at a later time.  If during the course of the call the physician/provider feels a virtual visit is not appropriate, you will not be charged for this service.\"     Physician has received verbal consent for a Virtual Visit from the patient.  Platform used:  [email or web address] Video  Time:  1:56pm to 2:26pm  Originating Location (pt. Location): Home  Distant Location (provider location):  [facility]     Chief Complaint   Patient presents with    RECHECK       Last Visit Date: 2/22/24  Previous Impression:  Chronic lumbosacral back pain 2' to advanced spondylosis with disc collapse, vacuum disc signal and loss of segmental lordosis L5-S1.  Chronic left S1 radiculopathy.  History of left hemilaminectomy-diskectomy L5-S1 (2000).  Left hip pain 2' to degenerative joint disease, with positive hip impingement test.  Rule out left sacroiliac joint pain.  Previous Plan:  - Left S1 TFESI.  If no relief, may consider Left SIJ " injection vs Left hip intra-articular injection.  Virtual RTC after above, to review response and discuss further options.      S>  63 year old female, here to review injection response, and discuss further options.    2/27/24 - Left S1 TFESI (Fan Head PA-C).  Per report, pain completely relieved (back from 7/10 to 0; right leg from 3/10 to 0; left leg from 7/10 to 0).  Per patient, definitely hit the right spot.  She felt a fullness as soon as she got the injection, in [place].  They flew out to [place] on 3/2/24; she usually walks a lot in the beach when they go.  However, she thinks by that time, the effect had been wearing off.  This time, she was able to walk about  0.25 mile, but had to stop because the pain was getting really bad.    Over the past year, had been doing evelyn and aqua yoga.  Is in the Spine Program at [facility] PT department; doing formal PT for her neck, but they have also been doing things for her low back.  Per patient, she was told there was nothing they could do for her low back.    PMHx:  Pulmonary sarcoidosis - dxed 2019.  Considered inactive.  Recent scan showed no change in nodules.  Per patient, she had recent cardiac calcium score of 170.  No prior heart attaches or acute events.    Works for  [employer].      Oswestry (TONO) Questionnaire        3/26/2024     1:43 PM   OSWESTRY DISABILITY INDEX   Count 10   Sum 28   Oswestry Score (%) 56 %      TONO 2/22/24 42.5%  TONO 3/26/24 56%      Neck Disability Index (NDI) Questionnaire        1/26/2024     8:24 AM   Neck Disability Index (NDI)   Neck Disability Index: Count 6   NDI: Total Score = SUM (points for all 10 findings) Incomplete   Neck Disability in Percent = (Total Score) / 50 * 100 Incomplete       PROMIS-10 Scores  Global Mental Health Score: (P) 12  Global Physical Health Score: (P) 10  PROMIS TOTAL - SUBSCORES: (P) 22    Physical Examination:    This was a virtual visit, so very limited exam could be  performed.  Patient seemed alert, oriented x 3, cooperative, with coherent speech, and not in distress.  Able to respond to questions appropriately and follow instructions.    Imaging:    Reviewed EOS full body AP lateral standing x-rays from 2/22/2024.  Advanced spondylosis with near total disc collapse L5-S1.  Trace spondylolisthesis L4-5.  Very mild degenerative scoliosis.  Sagittal measurements:  LL 33, ideal 46  L4-S1 26, ideal 31  L5-S1 18, ideal 18  PI 36  PT 18  SVA +0.3cm  TPA 12  GT 16  T10-L2 kyph 2    Assessment:    1.  Chronic lumbosacral back pain 2' to advanced spondylosis with disc collapse, vacuum disc signal and loss of segmental lordosis L5-S1.  2.  Chronic left S1 radiculopathy, with positive response to left S1 TFESI (2/27/2024, Fan Head PA-C)..  3.  Upper and lower lumbar flatback deformity (L4-S1 = 26 degrees, ideal 31; LL = 33 degrees, ideal 4-6) without significant sagittal imbalance (SVA = +0.3 cm).  4.  History of left hemilaminectomy-diskectomy L5-S1 (2000).  5.  Left hip pain 2' to degenerative joint disease, with positive hip impingement test.  6.  Rule out left sacroiliac joint pain.  7.  History of significant postoperative nausea and vomiting.    Plan:    Had good long discussion with patient.  We discussed her spinal condition.  Given her injection response, I believe the left S1 nerve root is mediating the significant amount of her pain symptoms.  She had previous decompression (discectomy) at L5-S1.  MRI shows advanced spondylosis with near total disc collapse, and disc osteophyte complexes at L5-S1, with likely impingement of left greater than right S1 nerve roots.  I discussed that this is a degenerative nonlife threatening condition.  At this point, patient feels she has exhausted nonoperative treatment, continues to have significant disabling symptoms which limit her quality of life.  We discussed potential surgical treatment.  I would recommend doing a fusion in addition  to revision decompression, given the advanced spondylosis and near-total disc collapse.  In order to provide more room for the nerves, we would also need to jacked up the disc space.  She also has lower lumbar flatback deformity, which we could also address with the fusion.  We discussed the rationale, pros and cons, risks, benefits and alternatives.  We also discussed anticipated postsurgical recovery and restrictions.  Patient elects to proceed.  We discussed bone graft options.  I recommend use of extra small kit infuse BMP, local autograft and crushed cancellous allograft (final graft [employer], [employer]).  Patient expressed agreement.    The patient does not have any untreated, underlying mental health conditions or issues which are a major contributor to their chronic pain.  Non-smoker.    - Case request: TLIF-SPO L5-S1; use of Extra-small kit Infuse BMP and allograft.  - PAC referral.  Of note, patient mentions she has significant problems with postoperative nausea and vomiting.  This almost makes her have second thoughts about having another surgery.  I told her to make sure she mentions this at her PAC visit, and our anesthesiology team will make sure that she is given appropriate medications to prevent nausea and vomiting.    40 minutes spent on the date of the encounter doing chart review/review of outside records/review of test results/interpretation of tests/patient visit/documentation/discussion with other provider(s)/discussion with patient and family.    Jcak Dumont MD    Orthopaedic Spine Surgery  Dept Orthopaedic Surgery, [facility]  [phone] office, [phone] pager  [email or web address]

## 2024-03-27 ENCOUNTER — TELEPHONE (OUTPATIENT)
Dept: ORTHOPEDICS | Facility: CLINIC | Age: 64
End: 2024-03-27
Payer: COMMERCIAL

## 2024-03-27 ENCOUNTER — MYC MEDICAL ADVICE (OUTPATIENT)
Dept: FAMILY MEDICINE | Facility: OTHER | Age: 64
End: 2024-03-27

## 2024-03-27 NOTE — TELEPHONE ENCOUNTER
Phoned patient to get her scheduled for surgery with Dr. Dumont    Call went to voicemail.  Provided call back number in voicemail:   [phone] & [phone] for care team.   Will try again.

## 2024-03-28 NOTE — TELEPHONE ENCOUNTER
"Call placed to patient to discuss positive covid 19 results.   Underlying Medical Conditions: Obesity, HTN, hyperlipidemia  Patient testing positive on 3/27   Test by:  at home test  Start of Symptoms: 3/25    COVID-19 Symptom Review    New or worsening difficulty breathing? no  Cough? yes     Dry or Productive?  productive  Fever?  Did not take temp     Highest temp past 24 hours?  NA  Chills?  no  Headache:  yes  Sore throat: yes  Chest pain: no  Diarrhea: yes  Body aches?  no  Nasal congestion or drainage?  yes       Appointment Scheduled:  patient declined antiviral treatment.    Pharmacy: [facility] in [place], [place]        Instructions below provided to patient. Patient verbalized understanding.     Instructions for Patients  Your COVID-19 test was positive. This means you have the virus. Please follow the \"How can I take care of myself\" and \"How do I self-isolate?\" guidelines in these instructions.    What treatments are available?  Over-the-counter medicines may help with your symptoms such as runny or stuffy nose, cough, chills, and fever. Talk to your care team about your options.     Some people are at high risk for severe illness (for example if you have a weak immune system, you're 65 or older, or you have certain medical problems). If your risk it high and your symptoms started in the last 5 to 7 days, we strongly recommend for you to get COVID treatment as soon as possible before you get really sick. Paxlovid, Molnupiravir and the monoclonal antibody treatments are proven safe and effective, make you feel better faster, and prevent hospitalization and death.       What are the symptoms of COVID-19?  Symptoms can include fever, cough, shortness of breath, chills, headache, muscle pain sore throat, fatigue, runny or stuffy nose, and loss of taste and smell. Other less common symptoms include nausea, vomiting, or diarrhea (watery stools).    Know when to call 911. Emergency warning signs " include:  Trouble breathing or shortness of breath  Pain or pressure in the chest that doesn't go away  Feeling confused like you haven't felt before, or not being able to wake up  Bluish-colored lips or face    How can I take care of myself?  Get lots of rest. Drink extra fluids (unless a doctor has told you not to).  Take Tylenol (acetaminophen) for fever or pain. If you have liver or kidney problems, ask your family doctor if it's okay to take Tylenol   Adults:   650 mg (two 325 mg pills or tablets) every 4 to 6 hours, or...   1,000 mg (two 500 mg pills or tablets) every 8 hours as needed.  Note: Don't take more than 3,000 mg in one day. Acetaminophen is found in many medicines (both prescribed and over the counter). Read all labels to be sure you don't take too much.  For children, check the Tylenol bottle for the right dose. The dose is based on the child's age or weight.  Take over the counter medicines for your symptoms as needed. Talk to your pharmacist.  If you have other health problems (like cancer, heart failure, an organ transplant, or severe kidney disease): Call your specialty clinic if you don't feel better in the next 2 days.    These guidelines are for isolating and quarantining before returning to work, school or .   For employers, schools and day cares: This is an official notice for this person's medical guidelines for returning in-person.   For health care sites: The CDC gives different isolation and quarantine guidelines for healthcare sites, please check with these sites before arriving.     How do I self-isolate?  You isolate when you have symptoms of COVID or a test shows you have COVID, even if you don't have symptoms.   If you DO have symptoms:  Stay home and away from others  For at least 5 days after your symptoms started, AND   You are fever free for 24 hours (with no medicine that reduces fever), AND  Your other symptoms are better.  Wear a mask for 10 full days any time you  are around others.  If you DON'T have symptoms:  Stay at home and away from others for at least 5 days after your positive test.  Wear a mask for 10 full days any time you are around others.    How and when do I quarantine?  You quarantine when you may have been exposed to the virus and DON'T have symptoms.   Stay home and away from others.   You must quarantine for 5 days after your last contact with a person who has COVID.  Note: If you are fully vaccinated, you don't need to quarantine. You should still follow the steps below.   Wear a mask for 10 full days any time you're around others.  Get tested at least 5 days after you were exposed, even if you don't have symptoms.   If you start to have symptoms, isolate right away and get tested.    Where can I get more information?  [facility] COVID-19 Resource Hub: www.[email or web address].org/covid19/   CDC Quarantine & Isolation: https://www.cdc.gov/coronavirus/2019-ncov/your-health/quarantine-isolation.html   CDC - What to Do If You're Sick: https://www.cdc.gov/coronavirus/2019-ncov/if-you-are-sick/index.html  [facility] clinical trials (COVID-19 research studies): [email or web address]  [place] Department of Health COVID-19 Public Hotline: 1-327.413.5813

## 2024-04-02 DIAGNOSIS — E78.00 PURE HYPERCHOLESTEROLEMIA: ICD-10-CM

## 2024-04-02 DIAGNOSIS — E78.2 MIXED HYPERLIPIDEMIA: ICD-10-CM

## 2024-04-02 RX ORDER — ATORVASTATIN CALCIUM 20 MG/1
20 TABLET, FILM COATED ORAL AT BEDTIME
Qty: 90 TABLET | Refills: 1 | Status: SHIPPED | OUTPATIENT
Start: 2024-04-02 | End: 2024-09-16

## 2024-04-02 NOTE — TELEPHONE ENCOUNTER
Second attempt to reach patient to get her schedule for surgery with Dr. Dumont.     Spoke w/patient who stated that she is currently unavailable and unable to write down call back number. Pt request it to be sent through [employer]. Patient will call back when ready to proceed with scheduling.     No further questions or concerns.

## 2024-04-03 NOTE — PROGRESS NOTES
Teaching Flowsheet   Relevant Diagnosis: spine  Teaching Topic: preop      Person(s) involved in teaching:   Patient     Motivation Level:  Asks Questions: Yes  Eager to Learn: Yes  Cooperative: Yes  Receptive (willing/able to accept information): Yes  Any cultural factors/Restoration beliefs that may influence understanding or compliance? No       Patient demonstrates understanding of the following:  Reason for the appointment, diagnosis and treatment plan: Yes  Knowledge of proper use of medications and conditions for which they are ordered (with special attention to potential side effects or drug interactions): Yes  Which situations necessitate calling provider and whom to contact: Yes       Teaching Concerns Addressed: over phone        Proper use and care of meds (medical equip, care aids, etc.): Yes  Nutritional needs and diet plan: Yes  Pain management techniques: Yes  Wound Care: Yes  How and/when to access community resources: Yes     Instructional Materials Used/Given: preop pkt mailed      Time spent with patient: 15 minutes.

## 2024-04-04 ENCOUNTER — THERAPY VISIT (OUTPATIENT)
Dept: PHYSICAL THERAPY | Facility: HOSPITAL | Age: 64
End: 2024-04-04
Attending: CHIROPRACTOR
Payer: COMMERCIAL

## 2024-04-04 DIAGNOSIS — M51.369 DDD (DEGENERATIVE DISC DISEASE), LUMBAR: Primary | ICD-10-CM

## 2024-04-04 PROCEDURE — 97010 HOT OR COLD PACKS THERAPY: CPT | Mod: GP

## 2024-04-04 PROCEDURE — 999N000104 HC STATISTIC NO CHARGE

## 2024-04-04 PROCEDURE — 97140 MANUAL THERAPY 1/> REGIONS: CPT | Mod: GP

## 2024-04-08 ENCOUNTER — THERAPY VISIT (OUTPATIENT)
Dept: PHYSICAL THERAPY | Facility: HOSPITAL | Age: 64
End: 2024-04-08
Attending: CHIROPRACTOR
Payer: COMMERCIAL

## 2024-04-08 DIAGNOSIS — M51.369 DDD (DEGENERATIVE DISC DISEASE), LUMBAR: Primary | ICD-10-CM

## 2024-04-08 PROCEDURE — 97010 HOT OR COLD PACKS THERAPY: CPT | Mod: GP,CQ

## 2024-04-08 PROCEDURE — 97140 MANUAL THERAPY 1/> REGIONS: CPT | Mod: GP,CQ

## 2024-04-09 NOTE — TELEPHONE ENCOUNTER
Patient is scheduled for surgery with Dr. Dumont    Spoke with: Zaira    Date of Surgery: 7/15/24    Location: [facility]    Informed patient they will need an adult  Yes    Pre op with Provider PAC, 6/21/24 at 11:45AM    Additional imaging/appointments: PO Made    Surgery packet: Received     Additional comments: N/A        Willa Rizzo on 4/9/2024 at 9:26 AM

## 2024-04-09 NOTE — TELEPHONE ENCOUNTER
Received voicemail from patient wanting to proceed with scheduling for surgery with Dr. Dumont.     Returned call; spoke with patient, stated that she was unavailable due to a meeting that she had. Pt will call back after her meeting ~10AM to get schedule for surgery.     No further questions or concerns.

## 2024-04-10 NOTE — TELEPHONE ENCOUNTER
Rescheduled  Patient is scheduled for surgery with Dr. Dumont    Spoke with: Zaira    Date of Surgery: 8/7/24    Location: [facility]    Informed patient they will need an adult  Yes    Pre op with Provider PAC, 7/15/24 at 11:45AM    Additional imaging/appointments: POP Rescheduled     Additional comments: Rescheduled from 7/15/24 to 8/07/24, per patient.         Willa Rizzo on 4/10/2024 at 8:34 AM

## 2024-04-10 NOTE — TELEPHONE ENCOUNTER
FUTURE VISIT INFORMATION      SURGERY INFORMATION:  Date: 7/15/24  [facility]  Surgeon:  Jack Dumont MD   Anesthesia Type:  general  Procedure: Transforaminal lumbar interbody fusion with De La Cruz-Miller osteotomy lumbar 5-sacral 1; Use of Infuse bone morphogenetic protein and allograft   Consult: virtual visit 3/26/24    RECORDS REQUESTED FROM:       Primary Care Provider: [facility]    Pertinent Medical History: hypertension, nonrheumatic aortic valve insufficiency     Most recent ECHO: 9/3/20- [facility]    Most recent Cardiac Stress Test: 10/4/23

## 2024-04-11 NOTE — TELEPHONE ENCOUNTER
FUTURE VISIT INFORMATION        SURGERY INFORMATION:  Date: 6/7/24  [facility]  Surgeon:  Jack Dumont MD   Anesthesia Type:  general  Procedure: Transforaminal lumbar interbody fusion with De La Cruz-Miller osteotomy lumbar 5-sacral 1; Use of Infuse bone morphogenetic protein and allograft   Consult: virtual visit 3/26/24     RECORDS REQUESTED FROM:         Primary Care Provider: [facility]     Pertinent Medical History: hypertension, nonrheumatic aortic valve insufficiency      Most recent ECHO: 9/3/20- [facility]     Most recent Cardiac Stress Test: 10/4/23

## 2024-04-18 ENCOUNTER — THERAPY VISIT (OUTPATIENT)
Dept: PHYSICAL THERAPY | Facility: HOSPITAL | Age: 64
End: 2024-04-18
Attending: CHIROPRACTOR
Payer: COMMERCIAL

## 2024-04-18 DIAGNOSIS — M51.369 DDD (DEGENERATIVE DISC DISEASE), LUMBAR: Primary | ICD-10-CM

## 2024-04-18 PROCEDURE — 97010 HOT OR COLD PACKS THERAPY: CPT | Mod: GP,CQ

## 2024-04-18 PROCEDURE — 97140 MANUAL THERAPY 1/> REGIONS: CPT | Mod: GP,CQ

## 2024-04-22 ENCOUNTER — THERAPY VISIT (OUTPATIENT)
Dept: PHYSICAL THERAPY | Facility: HOSPITAL | Age: 64
End: 2024-04-22
Attending: NURSE PRACTITIONER
Payer: COMMERCIAL

## 2024-04-22 DIAGNOSIS — M51.369 DDD (DEGENERATIVE DISC DISEASE), LUMBAR: Primary | ICD-10-CM

## 2024-04-22 PROCEDURE — 999N000104 HC STATISTIC NO CHARGE

## 2024-04-22 PROCEDURE — 97140 MANUAL THERAPY 1/> REGIONS: CPT | Mod: GP

## 2024-04-22 PROCEDURE — 97010 HOT OR COLD PACKS THERAPY: CPT | Mod: GP

## 2024-04-22 NOTE — PROGRESS NOTES
Dry Needling Intervention Note     Date: April 4, 2024  Complaint: Cervical Pain  Pain: 5  Progress: Progressing      Patient was seen for a session of dry needling intervention.  Informed consent was obtained from the patient after reviewing indications, precautions and contraindications.       Patient position: Prone.  Patient was appropriately draped.  Therapist's hands were washed appropriately to ensure safety. The call light was placed within reach of the patient and the patient was instructed on use.     Dry Needling Protocol Applied: Yes - Pt prone and agreeable to treatment. 2-30 mm needles utilized in levator scap B posterior to anterior with superior direction; 2-30 mm needles utilized in upper trap B posterior to anterior with superior direction; Needles in L upper trap pushed out to 1/2 exposure. All needles locking with rotation at 1/4 turn. 3 minutes, twist and tent, 3 minutes and removal with no bleeding noted.      Electrical Stimulation: No        Upon completion of the treatment, the needles were removed with clean hands by the treating therapist and were disposed of immediately in a sharps container.  Skin was inspected and patient was notified of expected response to treatment with the likelihood some some redness and mild aching that should resolve within 24-48 hours.The patient had no adverse reactions to the treatment and was instructed to seek medical care if any of the following symptoms developed:     Shortness of breath on exertion  Increased breathing rate  Chest pain  A dry cough  Bluish discoloration of the skin  Excessive sweating     Patient reported understanding and the dry needling session was completed.         Red Worrell, JAVI, Cert. DN

## 2024-04-24 NOTE — TELEPHONE ENCOUNTER
See phone message from surgery scheduler.    I called pt  & again reviewed preop teaching & packet & answered all questions.    Pt will do CT scan at [facility] where she works & will schedue that now.  Call back prn. Pt agreed.   Lolly Bruce RN.

## 2024-04-25 ENCOUNTER — THERAPY VISIT (OUTPATIENT)
Dept: PHYSICAL THERAPY | Facility: HOSPITAL | Age: 64
End: 2024-04-25
Attending: NURSE PRACTITIONER
Payer: COMMERCIAL

## 2024-04-25 DIAGNOSIS — M51.369 DDD (DEGENERATIVE DISC DISEASE), LUMBAR: Primary | ICD-10-CM

## 2024-04-25 PROCEDURE — 97010 HOT OR COLD PACKS THERAPY: CPT | Mod: GP

## 2024-04-25 PROCEDURE — 999N000104 HC STATISTIC NO CHARGE

## 2024-04-25 PROCEDURE — 97140 MANUAL THERAPY 1/> REGIONS: CPT | Mod: GP

## 2024-04-25 NOTE — PROGRESS NOTES
Dry Needling Intervention Note     Date: April 22, 2024  Complaint: Cervical Pain  Pain: 5  Progress: Progressing      Patient was seen for a session of dry needling intervention.  Informed consent was obtained from the patient after reviewing indications, precautions and contraindications.       Patient position: Prone.  Patient was appropriately draped.  Therapist's hands were washed appropriately to ensure safety. The call light was placed within reach of the patient and the patient was instructed on use.     Dry Needling Protocol Applied: Yes - Pt prone and agreeable to treatment. 2-30 mm needles utilized in levator scap B posterior to anterior with superior direction; 2-30 mm needles utilized in upper trap B posterior to anterior with superior direction; Needles in L upper trap pushed out to 1/2 exposure. All needles locking with rotation at 1/4 turn. 3 minutes, twist and tent, 3 minutes and removal with no bleeding noted.      Electrical Stimulation: No        Upon completion of the treatment, the needles were removed with clean hands by the treating therapist and were disposed of immediately in a sharps container.  Skin was inspected and patient was notified of expected response to treatment with the likelihood some some redness and mild aching that should resolve within 24-48 hours.The patient had no adverse reactions to the treatment and was instructed to seek medical care if any of the following symptoms developed:     Shortness of breath on exertion  Increased breathing rate  Chest pain  A dry cough  Bluish discoloration of the skin  Excessive sweating     Patient reported understanding and the dry needling session was completed.         Red Worrell, JAVI, Cert. DN

## 2024-04-29 ENCOUNTER — THERAPY VISIT (OUTPATIENT)
Dept: PHYSICAL THERAPY | Facility: HOSPITAL | Age: 64
End: 2024-04-29
Attending: CHIROPRACTOR
Payer: COMMERCIAL

## 2024-04-29 DIAGNOSIS — M51.369 DDD (DEGENERATIVE DISC DISEASE), LUMBAR: Primary | ICD-10-CM

## 2024-04-29 PROCEDURE — 97035 APP MDLTY 1+ULTRASOUND EA 15: CPT | Mod: GP

## 2024-04-29 PROCEDURE — 97010 HOT OR COLD PACKS THERAPY: CPT | Mod: GP

## 2024-04-29 PROCEDURE — 97140 MANUAL THERAPY 1/> REGIONS: CPT | Mod: GP

## 2024-04-29 NOTE — PROGRESS NOTES
Dry Needling Intervention Note     Date: April 25, 2024  Complaint: Cervical Pain  Pain: 5  Progress: Progressing      Patient was seen for a session of dry needling intervention.  Informed consent was obtained from the patient after reviewing indications, precautions and contraindications.       Patient position: Prone.  Patient was appropriately draped.  Therapist's hands were washed appropriately to ensure safety. The call light was placed within reach of the patient and the patient was instructed on use.     Dry Needling Protocol Applied: Yes - Pt prone and agreeable to treatment. 2-30 mm needles utilized in levator scap B posterior to anterior with superior direction; 2-30 mm needles utilized in upper trap B posterior to anterior with superior direction; Needles in L upper trap pushed out to 1/2 exposure. All needles locking with rotation at 1/4 turn. 3 minutes, twist and tent, 3 minutes and removal with no bleeding noted.      Electrical Stimulation: No        Upon completion of the treatment, the needles were removed with clean hands by the treating therapist and were disposed of immediately in a sharps container.  Skin was inspected and patient was notified of expected response to treatment with the likelihood some some redness and mild aching that should resolve within 24-48 hours.The patient had no adverse reactions to the treatment and was instructed to seek medical care if any of the following symptoms developed:     Shortness of breath on exertion  Increased breathing rate  Chest pain  A dry cough  Bluish discoloration of the skin  Excessive sweating     Patient reported understanding and the dry needling session was completed.         Red Worrell, JAVI, Cert. DN

## 2024-05-01 NOTE — PROGRESS NOTES
Dry Needling Intervention Note     Date: April 29, 2024  Complaint: Cervical Pain  Pain: 5  Progress: Progressing      Patient was seen for a session of dry needling intervention.  Informed consent was obtained from the patient after reviewing indications, precautions and contraindications.       Patient position: Prone.  Patient was appropriately draped.  Therapist's hands were washed appropriately to ensure safety. The call light was placed within reach of the patient and the patient was instructed on use.     Dry Needling Protocol Applied: Yes - Pt prone and agreeable to treatment. 2-30 mm needles utilized in levator scap B posterior to anterior with superior direction; 2-30 mm needles utilized in upper trap B posterior to anterior with superior direction; Needles in L upper trap pushed out to 1/2 exposure. All needles locking with rotation at 1/4 turn. 3 minutes, twist and tent, 3 minutes and removal with no bleeding noted.      Electrical Stimulation: No        Upon completion of the treatment, the needles were removed with clean hands by the treating therapist and were disposed of immediately in a sharps container.  Skin was inspected and patient was notified of expected response to treatment with the likelihood some some redness and mild aching that should resolve within 24-48 hours.The patient had no adverse reactions to the treatment and was instructed to seek medical care if any of the following symptoms developed:     Shortness of breath on exertion  Increased breathing rate  Chest pain  A dry cough  Bluish discoloration of the skin  Excessive sweating     Patient reported understanding and the dry needling session was completed.         Red Worrell, JAVI, Cert. DN

## 2024-05-08 ENCOUNTER — THERAPY VISIT (OUTPATIENT)
Dept: PHYSICAL THERAPY | Facility: HOSPITAL | Age: 64
End: 2024-05-08
Attending: CHIROPRACTOR
Payer: COMMERCIAL

## 2024-05-08 DIAGNOSIS — M51.369 DDD (DEGENERATIVE DISC DISEASE), LUMBAR: Primary | ICD-10-CM

## 2024-05-08 PROCEDURE — 97140 MANUAL THERAPY 1/> REGIONS: CPT | Mod: GP

## 2024-05-08 PROCEDURE — 97035 APP MDLTY 1+ULTRASOUND EA 15: CPT | Mod: GP

## 2024-05-08 PROCEDURE — 97010 HOT OR COLD PACKS THERAPY: CPT | Mod: GP,CQ

## 2024-05-15 ENCOUNTER — THERAPY VISIT (OUTPATIENT)
Dept: PHYSICAL THERAPY | Facility: HOSPITAL | Age: 64
End: 2024-05-15
Attending: CHIROPRACTOR
Payer: COMMERCIAL

## 2024-05-15 DIAGNOSIS — M51.369 DDD (DEGENERATIVE DISC DISEASE), LUMBAR: Primary | ICD-10-CM

## 2024-05-15 PROCEDURE — 97140 MANUAL THERAPY 1/> REGIONS: CPT | Mod: GP,CQ

## 2024-05-15 PROCEDURE — 97035 APP MDLTY 1+ULTRASOUND EA 15: CPT | Mod: GP,CQ

## 2024-05-16 ENCOUNTER — THERAPY VISIT (OUTPATIENT)
Dept: PHYSICAL THERAPY | Facility: HOSPITAL | Age: 64
End: 2024-05-16
Attending: CHIROPRACTOR
Payer: COMMERCIAL

## 2024-05-16 DIAGNOSIS — M51.369 DDD (DEGENERATIVE DISC DISEASE), LUMBAR: Primary | ICD-10-CM

## 2024-05-16 PROCEDURE — 97140 MANUAL THERAPY 1/> REGIONS: CPT | Mod: GP

## 2024-05-16 PROCEDURE — 999N000104 HC STATISTIC NO CHARGE

## 2024-05-16 PROCEDURE — 97010 HOT OR COLD PACKS THERAPY: CPT | Mod: GP

## 2024-05-20 ENCOUNTER — THERAPY VISIT (OUTPATIENT)
Dept: PHYSICAL THERAPY | Facility: HOSPITAL | Age: 64
End: 2024-05-20
Attending: CHIROPRACTOR
Payer: COMMERCIAL

## 2024-05-20 DIAGNOSIS — M51.369 DDD (DEGENERATIVE DISC DISEASE), LUMBAR: Primary | ICD-10-CM

## 2024-05-20 PROCEDURE — 97010 HOT OR COLD PACKS THERAPY: CPT | Mod: GP

## 2024-05-20 PROCEDURE — 999N000104 HC STATISTIC NO CHARGE

## 2024-05-20 PROCEDURE — 97140 MANUAL THERAPY 1/> REGIONS: CPT | Mod: GP

## 2024-05-21 NOTE — PROGRESS NOTES
Dry Needling Intervention Note     Date: May 16, 2024  Complaint: Cervical Pain  Pain: 5  Progress: Progressing      Patient was seen for a session of dry needling intervention.  Informed consent was obtained from the patient after reviewing indications, precautions and contraindications.       Patient position: Prone.  Patient was appropriately draped.  Therapist's hands were washed appropriately to ensure safety. The call light was placed within reach of the patient and the patient was instructed on use.     Dry Needling Protocol Applied: Yes - Pt prone and agreeable to treatment. 2-30 mm needles utilized in levator scap B posterior to anterior with superior direction; 2-30 mm needles utilized in upper trap B posterior to anterior with superior direction; Needles in L upper trap pushed out to 1/2 exposure. All needles locking with rotation at 1/4 turn. 3 minutes, twist and tent, 3 minutes and removal with no bleeding noted.      Electrical Stimulation: No        Upon completion of the treatment, the needles were removed with clean hands by the treating therapist and were disposed of immediately in a sharps container.  Skin was inspected and patient was notified of expected response to treatment with the likelihood some some redness and mild aching that should resolve within 24-48 hours.The patient had no adverse reactions to the treatment and was instructed to seek medical care if any of the following symptoms developed:     Shortness of breath on exertion  Increased breathing rate  Chest pain  A dry cough  Bluish discoloration of the skin  Excessive sweating     Patient reported understanding and the dry needling session was completed.         Red Worrell, JAVI, Cert. DN

## 2024-05-21 NOTE — PROGRESS NOTES
Dry Needling Intervention Note     Date: May 16, 2024  Complaint: Cervical Pain  Pain: 4 (lumbar pain 8/10)   Progress: Progressing      Patient was seen for a session of dry needling intervention.  Informed consent was obtained from the patient after reviewing indications, precautions and contraindications.       Patient position: Prone.  Patient was appropriately draped.  Therapist's hands were washed appropriately to ensure safety. The call light was placed within reach of the patient and the patient was instructed on use.     Dry Needling Protocol Applied: Yes - Pt prone and agreeable to treatment. 2-30 mm needles utilized in levator scap B posterior to anterior with superior direction; 2-30 mm needles utilized in upper trap B posterior to anterior with superior direction; Needles in L upper trap pushed out to 1/2 exposure. All needles locking with rotation at 1/4 turn. 3 minutes, twist and tent, 3 minutes and removal with no bleeding noted.      Electrical Stimulation: No        Upon completion of the treatment, the needles were removed with clean hands by the treating therapist and were disposed of immediately in a sharps container.  Skin was inspected and patient was notified of expected response to treatment with the likelihood some some redness and mild aching that should resolve within 24-48 hours.The patient had no adverse reactions to the treatment and was instructed to seek medical care if any of the following symptoms developed:     Shortness of breath on exertion  Increased breathing rate  Chest pain  A dry cough  Bluish discoloration of the skin  Excessive sweating     Patient reported understanding and the dry needling session was completed.         Red Worrell, JOANT, Cert. DN

## 2024-06-11 ENCOUNTER — OFFICE VISIT (OUTPATIENT)
Dept: FAMILY MEDICINE | Facility: OTHER | Age: 64
End: 2024-06-11
Attending: CHIROPRACTOR
Payer: COMMERCIAL

## 2024-06-11 VITALS
DIASTOLIC BLOOD PRESSURE: 70 MMHG | SYSTOLIC BLOOD PRESSURE: 113 MMHG | HEART RATE: 73 BPM | HEIGHT: 68 IN | RESPIRATION RATE: 16 BRPM | TEMPERATURE: 98.1 F | OXYGEN SATURATION: 97 % | BODY MASS INDEX: 31.7 KG/M2 | WEIGHT: 209.13 LBS

## 2024-06-11 DIAGNOSIS — M99.02 SEGMENTAL DYSFUNCTION OF THORACIC REGION: ICD-10-CM

## 2024-06-11 DIAGNOSIS — M54.2 NECK PAIN: Primary | ICD-10-CM

## 2024-06-11 DIAGNOSIS — M62.838 SPASM OF MUSCLE: ICD-10-CM

## 2024-06-11 PROCEDURE — 99213 OFFICE O/P EST LOW 20 MIN: CPT | Performed by: CHIROPRACTOR

## 2024-06-11 ASSESSMENT — PAIN SCALES - GENERAL: PAINLEVEL: MODERATE PAIN (5)

## 2024-06-11 NOTE — PROGRESS NOTES
Chief Complaint   Patient presents with    Neck Pain       HISTORY OF PRESENTING INJURY     Zaira returns for update on her neck and upper back muscle spasm.  She reports overall worsening, especially on the left.  Worsens throughout the day and ends up taking ibuprofen at end of work day.  She has been working with our PT team with dry needling and manual muscle work.  Though she has improvement afterwards, this is short-lived and she feels like she is not making any significant progress.  She is hopeful for other options today.  Zaira is also scheduled for lumbar surgery in August, but states this apparently hasn't been approved yet.      PAST MEDICAL HISTORY:  No past medical history on file.    PAST SURGICAL HISTORY:  No past surgical history on file.    ALLERGIES:  Allergies   Allergen Reactions    Cats     Other Environmental Allergy     Seasonal Allergies     Thimerosal (Thiomersal)     Hydromorphone Nausea    Ketorolac Tromethamine Nausea       CURRENT MEDICATIONS:  Current Outpatient Medications   Medication Sig Dispense Refill    albuterol (PROAIR HFA/PROVENTIL HFA/VENTOLIN HFA) 108 (90 Base) MCG/ACT inhaler Inhale 2 puffs into the lungs every 6 hours as needed for shortness of breath, wheezing or cough 18 g 0    allopurinol (ZYLOPRIM) 100 MG tablet Take 2 tablets (200 mg) by mouth daily 180 tablet 3    amitriptyline (ELAVIL) 25 MG tablet Take 1 tablet (25 mg) by mouth at bedtime 90 tablet 2    amLODIPine (NORVASC) 10 MG tablet Take 1 tablet (10 mg) by mouth daily 90 tablet 3    atorvastatin (LIPITOR) 20 MG tablet TAKE 1 TABLET BY MOUTH DAILY AT BEDTIME 90 tablet 1    cetirizine HCl (ZYRTEC ALLERGY) 10 MG CAPS Take 1 tablet by mouth daily      chlorthalidone (HYGROTON) 25 MG tablet 1/2 tablet daily 45 tablet 3    fluticasone (FLONASE) 50 MCG/ACT nasal spray Spray 2 sprays in nostril daily 18.2 mL 11    hydrOXYzine HCl (ATARAX) 25 MG tablet TAKE 1 TABLET BY MOUTH 3 TIMES DAILY AS NEEDED FOR ANXIETY  30 tablet 4    ketorolac (TORADOL) 10 MG tablet Take 1 tablet (10 mg) by mouth every 6 hours as needed 20 tablet 0    lisinopril (ZESTRIL) 40 MG tablet Take 1 tablet (40 mg) by mouth daily 90 tablet 3    metoprolol succinate ER (TOPROL XL) 100 MG 24 hr tablet TAKE 1 TABLET BY MOUTH DAILY. DO NOT CRUSH OR CHEW 90 tablet 3    mupirocin (BACTROBAN) 2 % nasal ointment       olopatadine (PATANOL) 0.1 % ophthalmic solution Place 1 drop into both eyes 2 times daily 5 mL 1    omeprazole (PRILOSEC) 20 MG DR capsule TAKE 1 CAPSULE BY MOUTH DAILY BEFORE A MEAL. DO NOT CRUSH. 90 capsule 3       SOCIAL HISTORY:  Social History     Socioeconomic History    Marital status:      Spouse name: Not on file    Number of children: Not on file    Years of education: Not on file    Highest education level: Not on file   Occupational History    Not on file   Tobacco Use    Smoking status: Former     Current packs/day: 0.00     Types: Cigarettes     Quit date: 1995     Years since quittin.4     Passive exposure: Never    Smokeless tobacco: Never   Vaping Use    Vaping status: Never Used   Substance and Sexual Activity    Alcohol use: Yes     Comment: ocassion    Drug use: Never    Sexual activity: Not on file   Other Topics Concern    Not on file   Social History Narrative    Not on file     Social Determinants of Health     Financial Resource Strain: Low Risk  (2024)    Financial Resource Strain     Within the past 12 months, have you or your family members you live with been unable to get utilities (heat, electricity) when it was really needed?: No   Food Insecurity: Low Risk  (2024)    Food Insecurity     Within the past 12 months, did you worry that your food would run out before you got money to buy more?: No     Within the past 12 months, did the food you bought just not last and you didn t have money to get more?: No   Transportation Needs: Low Risk  (2024)    Transportation Needs     Within the past 12  "months, has lack of transportation kept you from medical appointments, getting your medicines, non-medical meetings or appointments, work, or from getting things that you need?: No   Physical Activity: Not on file   Stress: Not on file   Social Connections: Not on file   Interpersonal Safety: Low Risk  (6/11/2024)    Interpersonal Safety     Do you feel physically and emotionally safe where you currently live?: Yes     Within the past 12 months, have you been hit, slapped, kicked or otherwise physically hurt by someone?: No     Within the past 12 months, have you been humiliated or emotionally abused in other ways by your partner or ex-partner?: No   Housing Stability: Low Risk  (1/2/2024)    Housing Stability     Do you have housing? : Yes     Are you worried about losing your housing?: No       FAMILY HISTORY:  No family history on file.    REVIEW OF SYSTEMS:    Unremarkable other than chief complaint      PHYSICAL EXAM:   /70 (BP Location: Right arm, Patient Position: Sitting, Cuff Size: Adult Large)   Pulse 73   Temp 98.1  F (36.7  C) (Tympanic)   Resp 16   Ht 1.727 m (5' 8\")   Wt 94.9 kg (209 lb 2 oz)   SpO2 97%   BMI 31.80 kg/m   Body mass index is 31.8 kg/m . General Appearance: Pleasant, alert, appropriate appearance for age. Forward head tilt. Supine motion palpation reveals 3/5 left c/t spasm at levator scapularis.  Limited laterall flexion bilaterally.  Cervical rotation is painful at end point bilaterally.  Seated motion palpation finds segmental joint dysfunction at T4-6 with left paraspinal spasm.        IMPRESSION/PLAN:    I do not believe her active treatment plan to be an effective long-term solution, though it offers her temporary relief.  Other treatment alternatives could include intersegmental spine roller traction, whirlpool based therapy, kinesiotape posture correction, deep tissue massage.  I will forward this note to treating therapist to first inquire about options available " here at [facility].      Total time spent today in chart review/preparation, face to face evaluation, and documentation: 26 minutes.    Shady Meyers DC, CAYETANOFP, CICE  Director - Occupational Medicine Department  Diplomate of the American Board of [employer]essionals  Board Certified - American Board of Independent Medical Examiners    10:36 AM 6/11/2024

## 2024-06-11 NOTE — Clinical Note
Let me know what you have available on this list, or, if you have any other suggestions. Thanks, Shady

## 2024-06-12 DIAGNOSIS — M62.838 SPASM OF MUSCLE: ICD-10-CM

## 2024-06-12 DIAGNOSIS — M99.02 SEGMENTAL DYSFUNCTION OF THORACIC REGION: ICD-10-CM

## 2024-06-12 DIAGNOSIS — M54.2 NECK PAIN: Primary | ICD-10-CM

## 2024-06-13 ENCOUNTER — THERAPY VISIT (OUTPATIENT)
Dept: PHYSICAL THERAPY | Facility: HOSPITAL | Age: 64
End: 2024-06-13
Attending: CHIROPRACTOR
Payer: COMMERCIAL

## 2024-06-13 DIAGNOSIS — M51.369 DDD (DEGENERATIVE DISC DISEASE), LUMBAR: Primary | ICD-10-CM

## 2024-06-13 PROCEDURE — 97010 HOT OR COLD PACKS THERAPY: CPT | Mod: GP

## 2024-06-13 PROCEDURE — 999N000104 HC STATISTIC NO CHARGE

## 2024-06-13 PROCEDURE — 97012 MECHANICAL TRACTION THERAPY: CPT | Mod: GP

## 2024-06-18 ENCOUNTER — OFFICE VISIT (OUTPATIENT)
Dept: FAMILY MEDICINE | Facility: OTHER | Age: 64
End: 2024-06-18
Attending: CHIROPRACTOR
Payer: COMMERCIAL

## 2024-06-18 VITALS
OXYGEN SATURATION: 96 % | WEIGHT: 209.5 LBS | SYSTOLIC BLOOD PRESSURE: 100 MMHG | RESPIRATION RATE: 20 BRPM | TEMPERATURE: 97.8 F | HEART RATE: 81 BPM | BODY MASS INDEX: 31.85 KG/M2 | DIASTOLIC BLOOD PRESSURE: 58 MMHG

## 2024-06-18 DIAGNOSIS — M62.838 SPASM OF MUSCLE: ICD-10-CM

## 2024-06-18 DIAGNOSIS — M54.2 NECK PAIN: Primary | ICD-10-CM

## 2024-06-18 PROCEDURE — 99213 OFFICE O/P EST LOW 20 MIN: CPT | Performed by: CHIROPRACTOR

## 2024-06-18 ASSESSMENT — PAIN SCALES - GENERAL: PAINLEVEL: EXTREME PAIN (8)

## 2024-06-18 NOTE — Clinical Note
A combination of her moving on the roller table and wearing the tens for 4 hours at high intensity.  Left suboccipitals are restricting motion.  Thanks - Shady

## 2024-06-18 NOTE — PROGRESS NOTES
"    Chief Complaint   Patient presents with    Follow Up       HISTORY OF PRESENTING INJURY     Zaira returns today having had a flare-up after her last physical therapy session on June 14, 2024.  She reports having intersegmental roller treatment and moved from her initial placement, sliding upwards towards her head to avoid a roller contacting her lower back.  She denies having any pain during the procedure.  Afterwards, a tens unit was placed on her left upper c/t junction.  She wore this in excess of four hours at a very high setting.  By the next day, her pain had gotten so bad she thought her \"screws had come loose.\"  She took a muscle relaxer and applied ice.  Today, she is better, but still quite painful at the left upper cervical spine, pointing to the suboccipital region.  Movement of the neck has increased since the flare-up began, but she still has significant limitation, with more pain noticed at suboccipital region on motion.  Denies new radiculopathy.  Zaira has not scheduled any further therapy sessions until she was able to be seen.  She does have pool therapy tonight and was still planning on going with limited participation.          PAST MEDICAL HISTORY:  No past medical history on file.    PAST SURGICAL HISTORY:  No past surgical history on file.    ALLERGIES:  Allergies   Allergen Reactions    Cats     Other Environmental Allergy     Seasonal Allergies     Thimerosal (Thiomersal)     Hydromorphone Nausea    Ketorolac Tromethamine Nausea       CURRENT MEDICATIONS:  Current Outpatient Medications   Medication Sig Dispense Refill    albuterol (PROAIR HFA/PROVENTIL HFA/VENTOLIN HFA) 108 (90 Base) MCG/ACT inhaler Inhale 2 puffs into the lungs every 6 hours as needed for shortness of breath, wheezing or cough 18 g 0    allopurinol (ZYLOPRIM) 100 MG tablet Take 2 tablets (200 mg) by mouth daily 180 tablet 3    amitriptyline (ELAVIL) 25 MG tablet Take 1 tablet (25 mg) by mouth at bedtime 90 tablet " 2    amLODIPine (NORVASC) 10 MG tablet Take 1 tablet (10 mg) by mouth daily 90 tablet 3    atorvastatin (LIPITOR) 20 MG tablet TAKE 1 TABLET BY MOUTH DAILY AT BEDTIME 90 tablet 1    cetirizine HCl (ZYRTEC ALLERGY) 10 MG CAPS Take 1 tablet by mouth daily      chlorthalidone (HYGROTON) 25 MG tablet 1/2 tablet daily 45 tablet 3    fluticasone (FLONASE) 50 MCG/ACT nasal spray Spray 2 sprays in nostril daily 18.2 mL 11    hydrOXYzine HCl (ATARAX) 25 MG tablet TAKE 1 TABLET BY MOUTH 3 TIMES DAILY AS NEEDED FOR ANXIETY 30 tablet 4    ketorolac (TORADOL) 10 MG tablet Take 1 tablet (10 mg) by mouth every 6 hours as needed 20 tablet 0    lisinopril (ZESTRIL) 40 MG tablet Take 1 tablet (40 mg) by mouth daily 90 tablet 3    metoprolol succinate ER (TOPROL XL) 100 MG 24 hr tablet TAKE 1 TABLET BY MOUTH DAILY. DO NOT CRUSH OR CHEW 90 tablet 3    mupirocin (BACTROBAN) 2 % nasal ointment       olopatadine (PATANOL) 0.1 % ophthalmic solution Place 1 drop into both eyes 2 times daily 5 mL 1    omeprazole (PRILOSEC) 20 MG DR capsule TAKE 1 CAPSULE BY MOUTH DAILY BEFORE A MEAL. DO NOT CRUSH. 90 capsule 3       SOCIAL HISTORY:  Social History     Socioeconomic History    Marital status:      Spouse name: Not on file    Number of children: Not on file    Years of education: Not on file    Highest education level: Not on file   Occupational History    Not on file   Tobacco Use    Smoking status: Former     Current packs/day: 0.00     Types: Cigarettes     Quit date: 1995     Years since quittin.4     Passive exposure: Never    Smokeless tobacco: Never   Vaping Use    Vaping status: Never Used   Substance and Sexual Activity    Alcohol use: Yes     Comment: ocassion    Drug use: Never    Sexual activity: Not on file   Other Topics Concern    Not on file   Social History Narrative    Not on file     Social Determinants of Health     Financial Resource Strain: Low Risk  (2024)    Financial Resource Strain     Within the  past 12 months, have you or your family members you live with been unable to get utilities (heat, electricity) when it was really needed?: No   Food Insecurity: Low Risk  (1/2/2024)    Food Insecurity     Within the past 12 months, did you worry that your food would run out before you got money to buy more?: No     Within the past 12 months, did the food you bought just not last and you didn t have money to get more?: No   Transportation Needs: Low Risk  (1/2/2024)    Transportation Needs     Within the past 12 months, has lack of transportation kept you from medical appointments, getting your medicines, non-medical meetings or appointments, work, or from getting things that you need?: No   Physical Activity: Not on file   Stress: Not on file   Social Connections: Not on file   Interpersonal Safety: Low Risk  (6/11/2024)    Interpersonal Safety     Do you feel physically and emotionally safe where you currently live?: Yes     Within the past 12 months, have you been hit, slapped, kicked or otherwise physically hurt by someone?: No     Within the past 12 months, have you been humiliated or emotionally abused in other ways by your partner or ex-partner?: No   Housing Stability: Low Risk  (1/2/2024)    Housing Stability     Do you have housing? : Yes     Are you worried about losing your housing?: No       FAMILY HISTORY:  No family history on file.    REVIEW OF SYSTEMS:    Unremarkable other than chief complaint      PHYSICAL EXAM:   /58 (BP Location: Right arm, Patient Position: Sitting, Cuff Size: Adult Regular)   Pulse 81   Temp 97.8  F (36.6  C) (Tympanic)   Resp 20   Wt 95 kg (209 lb 8 oz)   SpO2 96%   BMI 31.85 kg/m   Body mass index is 31.85 kg/m . General Appearance: Forward head tilt.  Lateral flexion and flexion are restricted.  Pain at left levator scapularis.  Normal thoracic rotation bilaterally.  No other changes from previous evaluation.    IMPRESSION/PLAN:    Reassurance given on fusion  with plan determined as follows:  wait to proceed with physical therapy again until next week.  She will still attend pool therapy tonight but we discussed limitations of participation.  Ice at home for acute flare-up.  PT to work at left suboccipital region with manual trigger point work.      Messaged sent to treating PT for coordination for next week.    Encouraged Zaira to reach out to us should the flare-up not subside significantly by the end of this week. Imaging to be considered if this to be the case.    Total time spent today in chart review/preparation, face to face evaluation, and documentation: 29 minutes.    Shday Meyers DC, DABFP, CICE  Director - Occupational Medicine Department  Diplomate of the American Board of Fore[employer] Professionals  Board Certified - American Board of Independent Medical Examiners    1:15 PM 6/18/2024

## 2024-06-20 ENCOUNTER — TELEPHONE (OUTPATIENT)
Dept: FAMILY MEDICINE | Facility: OTHER | Age: 64
End: 2024-06-20
Payer: COMMERCIAL

## 2024-06-20 ENCOUNTER — THERAPY VISIT (OUTPATIENT)
Dept: PHYSICAL THERAPY | Facility: HOSPITAL | Age: 64
End: 2024-06-20
Attending: CHIROPRACTOR
Payer: COMMERCIAL

## 2024-06-20 DIAGNOSIS — M51.369 DDD (DEGENERATIVE DISC DISEASE), LUMBAR: Primary | ICD-10-CM

## 2024-06-20 PROCEDURE — 97140 MANUAL THERAPY 1/> REGIONS: CPT | Mod: GP,CQ

## 2024-06-20 NOTE — TELEPHONE ENCOUNTER
3:28 PM    Reason for Call: Phone Call    Description: pt was in PT today and pt would like a x-ray of her neck because of pain. Please call pt    Was an appointment offered for this call? No  If yes : Appointment type              Date    Preferred method for responding to this message: Telephone Call  What is your phone number ? [phone]     If we cannot reach you directly, may we leave a detailed response at the number you provided? yes    Can this message wait until your PCP/provider returns, if available today? Michelle Hansen

## 2024-06-21 ENCOUNTER — APPOINTMENT (OUTPATIENT)
Dept: GENERAL RADIOLOGY | Facility: HOSPITAL | Age: 64
End: 2024-06-21
Attending: NURSE PRACTITIONER
Payer: COMMERCIAL

## 2024-06-21 ENCOUNTER — HOSPITAL ENCOUNTER (EMERGENCY)
Facility: HOSPITAL | Age: 64
Discharge: HOME | End: 2024-06-21
Attending: NURSE PRACTITIONER | Admitting: NURSE PRACTITIONER
Payer: COMMERCIAL

## 2024-06-21 VITALS
OXYGEN SATURATION: 98 % | TEMPERATURE: 97.9 F | HEART RATE: 87 BPM | RESPIRATION RATE: 19 BRPM | DIASTOLIC BLOOD PRESSURE: 77 MMHG | SYSTOLIC BLOOD PRESSURE: 119 MMHG

## 2024-06-21 DIAGNOSIS — M54.2 NECK PAIN: Primary | ICD-10-CM

## 2024-06-21 PROCEDURE — G0463 HOSPITAL OUTPT CLINIC VISIT: HCPCS

## 2024-06-21 PROCEDURE — 72040 X-RAY EXAM NECK SPINE 2-3 VW: CPT

## 2024-06-21 PROCEDURE — 99213 OFFICE O/P EST LOW 20 MIN: CPT | Performed by: NURSE PRACTITIONER

## 2024-06-21 RX ORDER — CYCLOBENZAPRINE HCL 10 MG
10 TABLET ORAL 2 TIMES DAILY PRN
Qty: 15 TABLET | Refills: 0 | Status: ON HOLD | OUTPATIENT
Start: 2024-06-21 | End: 2024-08-23

## 2024-06-21 RX ORDER — PREDNISONE 20 MG/1
TABLET ORAL
Qty: 10 TABLET | Refills: 0 | Status: SHIPPED | OUTPATIENT
Start: 2024-06-21 | End: 2024-08-02

## 2024-06-21 ASSESSMENT — ENCOUNTER SYMPTOMS
FEVER: 0
VOMITING: 0
MYALGIAS: 1
PSYCHIATRIC NEGATIVE: 1
NAUSEA: 0
SHORTNESS OF BREATH: 0
CHILLS: 0
NECK PAIN: 1
DIARRHEA: 0

## 2024-06-21 ASSESSMENT — COLUMBIA-SUICIDE SEVERITY RATING SCALE - C-SSRS
1. IN THE PAST MONTH, HAVE YOU WISHED YOU WERE DEAD OR WISHED YOU COULD GO TO SLEEP AND NOT WAKE UP?: NO
6. HAVE YOU EVER DONE ANYTHING, STARTED TO DO ANYTHING, OR PREPARED TO DO ANYTHING TO END YOUR LIFE?: NO
2. HAVE YOU ACTUALLY HAD ANY THOUGHTS OF KILLING YOURSELF IN THE PAST MONTH?: NO

## 2024-06-21 ASSESSMENT — ACTIVITIES OF DAILY LIVING (ADL): ADLS_ACUITY_SCORE: 35

## 2024-06-21 NOTE — DISCHARGE INSTRUCTIONS
Prednisone as ordered    Cyclobenzaprine as needed for muscle spasms    Alternate ice and heat    Gentle stretching     Follow-up with primary care provider or return to urgent care/ED with any worsening condition or additional concerns

## 2024-06-21 NOTE — ED TRIAGE NOTES
C/o shoulder/neck pain    Swelling to the left side  States that she is seeing Chiro who recommended imaging   Sx of pain, pain started to flare again on friday  Has taken ibu.

## 2024-06-21 NOTE — ED PROVIDER NOTES
History     Chief Complaint   Patient presents with    Neck Pain     HPI  Zaira Naranjo is a 63 year old female who presents urgent care today ambulatory with complaints of neck pain and swelling primarily to the left side.  History of chronic neck pain with previous surgeries, flareup of neck pain started 7 days ago.  Denies any fall, injury or trauma.  Denies any upper or lower extremity weakness.  Denies any bowel or bladder dysfunction.  Denies any saddle anesthesia.  Denies any fever, chills, nausea, vomiting, diarrhea, shortness of breath or chest pain.  Follows with [facility] and has attempted a TENS unit, pool therapy, ibuprofen, muscle relaxers and ice.  [facility] wanted to do an XR last visit and patient declined, will do XR today.  No other concerns.    Allergies:  Allergies   Allergen Reactions    Cats     Other Environmental Allergy     Seasonal Allergies     Thimerosal (Thiomersal)     Hydromorphone Nausea    Ketorolac Tromethamine Nausea       Problem List:    Patient Active Problem List    Diagnosis Date Noted    DDD (degenerative disc disease), lumbar 01/26/2024     Priority: Medium    Amplified musculoskeletal pain, localized 05/21/2021     Priority: Medium     Formatting of this note might be different from the original.  Added automatically from request for surgery [id number]      Cervical radiculopathy 03/08/2021     Priority: Medium     Formatting of this note might be different from the original.  Added automatically from request for surgery [id number]      Diffuse cystic mastopathy 12/11/2020     Priority: Medium    Irritable bowel syndrome 12/11/2020     Priority: Medium    Pulmonary sarcoidosis (H24) 12/23/2019     Priority: Medium    Mediastinal lymphadenopathy due to sarcoidosis 11/05/2019     Priority: Medium     Formatting of this note might be different from the original.  Added automatically from request for surgery [id number]      Gout, unspecified cause, unspecified chronicity,  unspecified site 2019     Priority: Medium    Nonrheumatic aortic valve insufficiency 2016     Priority: Medium    Obesity, Class II, BMI 35-39.9 2013     Priority: Medium    Hyperlipidemia 2013     Priority: Medium    Contact dermatitis and other eczema, due to unspecified cause 2010     Priority: Medium    Pure hypercholesterolemia 2010     Priority: Medium    Anxiety 2009     Priority: Medium    Gastroesophageal reflux disease 2009     Priority: Medium    Insomnia, unspecified 2009     Priority: Medium     Formatting of this note might be different from the original.  Updated per 10/1/17 IMO import      Diverticulosis of colon 2009     Priority: Medium     Formatting of this note might be different from the original.  IMO Update 10/11      Generalized hyperhidrosis 2009     Priority: Medium    Hemangioma of skin and subcutaneous tissue 2009     Priority: Medium    Essential hypertension 2008     Priority: Medium     Formatting of this note might be different from the original.  IMO Update          Past Medical History:    No past medical history on file.    Past Surgical History:    No past surgical history on file.    Family History:    No family history on file.    Social History:  Marital Status:   [2]  Social History     Tobacco Use    Smoking status: Former     Current packs/day: 0.00     Types: Cigarettes     Quit date: 1995     Years since quittin.4     Passive exposure: Never    Smokeless tobacco: Never   Vaping Use    Vaping status: Never Used   Substance Use Topics    Alcohol use: Yes     Comment: ocassion    Drug use: Never        Medications:    cyclobenzaprine (FLEXERIL) 10 MG tablet  predniSONE (DELTASONE) 20 MG tablet  albuterol (PROAIR HFA/PROVENTIL HFA/VENTOLIN HFA) 108 (90 Base) MCG/ACT inhaler  allopurinol (ZYLOPRIM) 100 MG tablet  amitriptyline (ELAVIL) 25 MG tablet  amLODIPine (NORVASC) 10 MG  tablet  atorvastatin (LIPITOR) 20 MG tablet  cetirizine HCl (ZYRTEC ALLERGY) 10 MG CAPS  chlorthalidone (HYGROTON) 25 MG tablet  fluticasone (FLONASE) 50 MCG/ACT nasal spray  hydrOXYzine HCl (ATARAX) 25 MG tablet  ketorolac (TORADOL) 10 MG tablet  lisinopril (ZESTRIL) 40 MG tablet  metoprolol succinate ER (TOPROL XL) 100 MG 24 hr tablet  mupirocin (BACTROBAN) 2 % nasal ointment  olopatadine (PATANOL) 0.1 % ophthalmic solution  omeprazole (PRILOSEC) 20 MG DR capsule      Review of Systems   Constitutional:  Negative for chills and fever.   Respiratory:  Negative for shortness of breath.    Cardiovascular:  Negative for chest pain.   Gastrointestinal:  Negative for diarrhea, nausea and vomiting.   Musculoskeletal:  Positive for myalgias and neck pain. Negative for gait problem.   Skin: Negative.    Psychiatric/Behavioral: Negative.       Physical Exam   BP: 119/77  Pulse: 87  Temp: 97.9  F (36.6  C)  Resp: 19  SpO2: 98 %    Physical Exam  Vitals and nursing note reviewed.   Constitutional:       General: She is not in acute distress.     Appearance: Normal appearance. She is not ill-appearing or toxic-appearing.   Cardiovascular:      Rate and Rhythm: Normal rate and regular rhythm.      Pulses: Normal pulses.      Heart sounds: Normal heart sounds.   Pulmonary:      Effort: Pulmonary effort is normal.      Breath sounds: Normal breath sounds.   Musculoskeletal:      Cervical back: Swelling (mild left sided swelling), spasms and tenderness present. No edema, deformity, erythema, lacerations, rigidity or torticollis. Pain with movement present. Normal range of motion.   Skin:     General: Skin is warm and dry.      Capillary Refill: Capillary refill takes less than 2 seconds.   Neurological:      Mental Status: She is alert.   Psychiatric:         Mood and Affect: Mood normal.       ED Course     Results for orders placed or performed during the hospital encounter of 06/21/24 (from the past 24 hour(s))   Cervical spine  XR, 2-3 views    Narrative    PROCEDURE: XR CERVICAL SPINE 2/3 VIEWS 6/21/2024 1:02 PM    HISTORY: neck pain    COMPARISONS: 8/8/2023.    TECHNIQUE: 3 views.    FINDINGS: There is been previous anterior interbody fusion at C6-7.  Hardware appears intact without acute bony complication.    There is moderate degenerative change at the C5-6 level, similar to  the prior exam. There is slight retrolisthesis of C5 relative to C6.  Anterior spurs are seen in the mid cervical spine.    No acute fracture or dislocation is seen.         Impression    IMPRESSION: No acute bony abnormality.    FRANCISCO CEDENO MD         SYSTEM ID:  [id number]       Medications - No data to display    Assessments & Plan (with Medical Decision Making)     I have reviewed the nursing notes.    I have reviewed the findings, diagnosis, plan and need for follow up with the patient.  (M54.2) Neck pain  (primary encounter diagnosis)  Plan:   Patient ambulatory with a nontoxic appearance.  Patient arrived with complaints of neck pain primarily to the left side with mild swelling.  Flareup of neck pain started 7 days ago.  History of chronic neck pain with no recent fall, injury or trauma.  No red flag symptoms.  Denies any fever, chills, nausea, vomiting, diarrhea, shortness of breath or chest pain.  Cervical spine x-ray completed which shows no acute bony abnormality.  Moderate degenerative changes at C5-6 level, hardware appears intact without acute bony complication at C6-7.  Will treat neck pain with short course of prednisone for pain and inflammation and cyclobenzaprine for muscle spasms.  Patient to alternate ice and heat.  Gentle stretching.  Follow-up with primary care provider or return to urgent care/ED with any worsening in condition or additional concerns.  Patient in agreement treatment plan.    Discharge Medication List as of 6/21/2024  1:21 PM        START taking these medications    Details   cyclobenzaprine (FLEXERIL) 10 MG tablet Take  1 tablet (10 mg) by mouth 2 times daily as needed for muscle spasms, Disp-15 tablet, R-0, E-Prescribe      predniSONE (DELTASONE) 20 MG tablet Take two tablets (= 40mg) each day for 5 (five) days, Disp-10 tablet, R-0, E-Prescribe           Final diagnoses:   Neck pain     6/21/2024   [facility]       Sharri Montalvo NP  06/21/24 [id number]

## 2024-06-25 ENCOUNTER — HOSPITAL ENCOUNTER (EMERGENCY)
Facility: HOSPITAL | Age: 64
Discharge: HOME | End: 2024-06-25
Attending: NURSE PRACTITIONER | Admitting: NURSE PRACTITIONER
Payer: COMMERCIAL

## 2024-06-25 VITALS
SYSTOLIC BLOOD PRESSURE: 103 MMHG | DIASTOLIC BLOOD PRESSURE: 66 MMHG | HEART RATE: 82 BPM | TEMPERATURE: 97.3 F | OXYGEN SATURATION: 96 % | RESPIRATION RATE: 19 BRPM

## 2024-06-25 DIAGNOSIS — J01.90 ACUTE SINUSITIS WITH SYMPTOMS > 10 DAYS: Primary | ICD-10-CM

## 2024-06-25 DIAGNOSIS — R51.9 FACIAL PAIN: ICD-10-CM

## 2024-06-25 PROCEDURE — G0463 HOSPITAL OUTPT CLINIC VISIT: HCPCS

## 2024-06-25 PROCEDURE — 99213 OFFICE O/P EST LOW 20 MIN: CPT | Performed by: NURSE PRACTITIONER

## 2024-06-25 RX ORDER — AZITHROMYCIN 250 MG/1
TABLET, FILM COATED ORAL
Qty: 6 TABLET | Refills: 0 | Status: SHIPPED | OUTPATIENT
Start: 2024-06-25 | End: 2024-06-30

## 2024-06-25 ASSESSMENT — ENCOUNTER SYMPTOMS
FEVER: 0
SINUS PAIN: 1
COUGH: 0
SINUS PRESSURE: 1
CHILLS: 0
TROUBLE SWALLOWING: 0
SORE THROAT: 0

## 2024-06-25 NOTE — DISCHARGE INSTRUCTIONS
You have a sinus infection which will be treated with the azithromycin that was prescribed today.  Tylenol or ibuprofen as needed for pain.    Follow-up with your primary doctor as needed.    Return to urgent care or emergency room for any worsening or concerning symptoms.

## 2024-06-25 NOTE — ED PROVIDER NOTES
History     Chief Complaint   Patient presents with    Nasal Congestion     HPI  Zaira Naranjo is a 63 year old female who presents to urgent care for evaluation of right-sided facial pain and sinus congestion.  Symptoms initially started about 2 weeks ago and have progressively worsened.  She reports postnasal drainage, congestion, felt tasting drainage and sinus pressure.  Notes that it is very sensitive to touch on the right side of her face.  She does have a history of tooth implants that resulted in a revision of her sinuses.  She notes that this is [place] where she feels most of the pain.  Taking Tylenol every now and then.  Currently on prednisone for an unrelated complaint.  No fevers or chills.  No sore throat, cough, shortness of breath or chest pain.    Allergies:  Allergies   Allergen Reactions    Cats     Other Environmental Allergy     Seasonal Allergies     Thimerosal (Thiomersal)     Hydromorphone Nausea    Ketorolac Tromethamine Nausea       Problem List:    Patient Active Problem List    Diagnosis Date Noted    DDD (degenerative disc disease), lumbar 01/26/2024     Priority: Medium    Amplified musculoskeletal pain, localized 05/21/2021     Priority: Medium     Formatting of this note might be different from the original.  Added automatically from request for surgery [id number]      Cervical radiculopathy 03/08/2021     Priority: Medium     Formatting of this note might be different from the original.  Added automatically from request for surgery [id number]      Diffuse cystic mastopathy 12/11/2020     Priority: Medium    Irritable bowel syndrome 12/11/2020     Priority: Medium    Pulmonary sarcoidosis (H24) 12/23/2019     Priority: Medium    Mediastinal lymphadenopathy due to sarcoidosis 11/05/2019     Priority: Medium     Formatting of this note might be different from the original.  Added automatically from request for surgery [id number]      Gout, unspecified cause, unspecified chronicity,  unspecified site 2019     Priority: Medium    Nonrheumatic aortic valve insufficiency 2016     Priority: Medium    Obesity, Class II, BMI 35-39.9 2013     Priority: Medium    Hyperlipidemia 2013     Priority: Medium    Contact dermatitis and other eczema, due to unspecified cause 2010     Priority: Medium    Pure hypercholesterolemia 2010     Priority: Medium    Anxiety 2009     Priority: Medium    Gastroesophageal reflux disease 2009     Priority: Medium    Insomnia, unspecified 2009     Priority: Medium     Formatting of this note might be different from the original.  Updated per 10/1/17 IMO import      Diverticulosis of colon 2009     Priority: Medium     Formatting of this note might be different from the original.  IMO Update 10/11      Generalized hyperhidrosis 2009     Priority: Medium    Hemangioma of skin and subcutaneous tissue 2009     Priority: Medium    Essential hypertension 2008     Priority: Medium     Formatting of this note might be different from the original.  IMO Update          Past Medical History:    History reviewed. No pertinent past medical history.    Past Surgical History:    History reviewed. No pertinent surgical history.    Family History:    History reviewed. No pertinent family history.    Social History:  Marital Status:   [2]  Social History     Tobacco Use    Smoking status: Former     Current packs/day: 0.00     Types: Cigarettes     Quit date: 1995     Years since quittin.5     Passive exposure: Never    Smokeless tobacco: Never   Vaping Use    Vaping status: Never Used   Substance Use Topics    Alcohol use: Yes     Comment: ocassion    Drug use: Never        Medications:    allopurinol (ZYLOPRIM) 100 MG tablet  amitriptyline (ELAVIL) 25 MG tablet  amLODIPine (NORVASC) 10 MG tablet  atorvastatin (LIPITOR) 20 MG tablet  azithromycin (ZITHROMAX) 250 MG tablet  cetirizine HCl (ZYRTEC  ALLERGY) 10 MG CAPS  chlorthalidone (HYGROTON) 25 MG tablet  lisinopril (ZESTRIL) 40 MG tablet  metoprolol succinate ER (TOPROL XL) 100 MG 24 hr tablet  omeprazole (PRILOSEC) 20 MG DR capsule  predniSONE (DELTASONE) 20 MG tablet  albuterol (PROAIR HFA/PROVENTIL HFA/VENTOLIN HFA) 108 (90 Base) MCG/ACT inhaler  cyclobenzaprine (FLEXERIL) 10 MG tablet  fluticasone (FLONASE) 50 MCG/ACT nasal spray  hydrOXYzine HCl (ATARAX) 25 MG tablet  ketorolac (TORADOL) 10 MG tablet  mupirocin (BACTROBAN) 2 % nasal ointment  olopatadine (PATANOL) 0.1 % ophthalmic solution          Review of Systems   Constitutional:  Negative for chills and fever.   HENT:  Positive for congestion, postnasal drip, sinus pressure and sinus pain. Negative for ear pain, sore throat and trouble swallowing.    Respiratory:  Negative for cough.    All other systems reviewed and are negative.      Physical Exam   BP: 103/66  Pulse: 82  Temp: 97.3  F (36.3  C)  Resp: 19  SpO2: 96 %      Physical Exam  Vitals and nursing note reviewed.   Constitutional:       Appearance: Normal appearance. She is not ill-appearing or toxic-appearing.   HENT:      Head: Atraumatic.      Right Ear: Ear canal normal.      Left Ear: Tympanic membrane and ear canal normal.      Nose: Congestion present.      Right Sinus: Maxillary sinus tenderness and frontal sinus tenderness present.      Left Sinus: No maxillary sinus tenderness or frontal sinus tenderness.      Comments: Mild swelling to right cheek in comparison to the left.     Mouth/Throat:      Mouth: Mucous membranes are moist.   Eyes:      Pupils: Pupils are equal, round, and reactive to light.   Cardiovascular:      Rate and Rhythm: Normal rate and regular rhythm.      Heart sounds: Normal heart sounds.   Pulmonary:      Effort: Pulmonary effort is normal. No respiratory distress.      Breath sounds: Normal breath sounds.   Skin:     General: Skin is warm and dry.   Neurological:      Mental Status: She is alert and  oriented to person, place, and time.         ED Course        Procedures         No results found for this or any previous visit (from the past 24 hour(s)).    Medications - No data to display    Assessments & Plan (with Medical Decision Making)   63-year-old female that presented for evaluation of right-sided facial pain, sinus pressure and congestion with symptoms having started about 2 weeks ago progressively worsening.  Findings are most consistent with sinusitis.  This will be treated with azithromycin which patient states has worked well for her in the past with similar symptoms.  Recommended Tylenol or ibuprofen as needed for pain.  Take an over-the-counter oral decongestion to oral the Zyrtec that you have.  Follow-up with primary doctor as needed.  Return to urgent care or emergency room for any worsening or concerning symptoms.    I have reviewed the nursing notes.    I have reviewed the findings, diagnosis, plan and need for follow up with the patient.  This document was prepared using a combination of typing and voice generated software.  While every attempt was made for accuracy, spelling and grammatical errors may exist.         New Prescriptions    AZITHROMYCIN (ZITHROMAX) 250 MG TABLET    Take 2 tablets (500 mg) by mouth daily for 1 day, THEN 1 tablet (250 mg) daily for 4 days.       Final diagnoses:   Acute sinusitis with symptoms > 10 days   Facial pain       6/25/2024   [facility]       Mpofu, Prudence, CNP  06/25/24 [id number]

## 2024-06-25 NOTE — ED TRIAGE NOTES
C/o congestion    2 weeks ago    Has had rebuilding surgery on the right side, is having a ton of sinus pressure on the right side    Tyl today

## 2024-06-28 ENCOUNTER — PRE VISIT (OUTPATIENT)
Dept: SURGERY | Facility: CLINIC | Age: 64
End: 2024-06-28

## 2024-07-03 ENCOUNTER — MYC MEDICAL ADVICE (OUTPATIENT)
Dept: FAMILY MEDICINE | Facility: OTHER | Age: 64
End: 2024-07-03

## 2024-07-03 ENCOUNTER — VIRTUAL VISIT (OUTPATIENT)
Dept: FAMILY MEDICINE | Facility: OTHER | Age: 64
End: 2024-07-03
Attending: NURSE PRACTITIONER
Payer: COMMERCIAL

## 2024-07-03 DIAGNOSIS — J01.90 ACUTE SINUSITIS WITH SYMPTOMS > 10 DAYS: Primary | ICD-10-CM

## 2024-07-03 PROCEDURE — 99441 PR PHYSICIAN TELEPHONE EVALUATION 5-10 MIN: CPT | Performed by: NURSE PRACTITIONER

## 2024-07-03 RX ORDER — AMOXICILLIN 875 MG
875 TABLET ORAL 2 TIMES DAILY
Qty: 20 TABLET | Refills: 0 | Status: SHIPPED | OUTPATIENT
Start: 2024-07-03 | End: 2024-07-13

## 2024-07-03 NOTE — PATIENT INSTRUCTIONS
Increase Flonase 2 times daily for 1-2 weeks    Take amoxacillin 2 time daily for 10     Follow up with dentist     Follow up here if no improvement - can consider sinus imaging if needed

## 2024-07-03 NOTE — PROGRESS NOTES
"Zaira is a 63 year old who is being evaluated via a billable telephone visit.    What phone number would you like to be contacted at? [phone]  How would you like to obtain your AVS? Mail a copy  Originating Location (pt. Location): Home    Distant Location (provider location):  On-site    Assessment & Plan     Acute sinusitis with symptoms > 10 days  Recurrent sinusitis post dental procedure  Antibiotic failure with azithromycin   Increase nasal steroid to 2 times a day for 1-2 weeks  - amoxicillin (AMOXIL) 875 MG tablet; Take 1 tablet (875 mg) by mouth 2 times daily for 10 days    Follow up with dentist due to recent dental procedure  If no improvement with current treatment consider imaging sinuses       BMI  Estimated body mass index is 31.85 kg/m  as calculated from the following:    Height as of 6/11/24: 1.727 m (5' 8\").    Weight as of 6/18/24: 95 kg (209 lb 8 oz).         See Patient Instructions    No follow-ups on file.    Subjective   Zaira is a 63 year old, presenting for the following health issues:  No chief complaint on file.    HPI     Acute Illness  Acute illness concerns: sinus pain when bending over  Onset/Duration: few days  Symptoms:  Fever: No  Chills/Sweats: No  Headache (location?): YES  Sinus Pressure: YES  Conjunctivitis:  No  Ear Pain: no  Rhinorrhea: No  Congestion: No  Sore Throat: No  Cough: no  Wheeze: No  Decreased Appetite: No  Nausea: No  Vomiting: No  Diarrhea: No  Dysuria/Freq.: No  Dysuria or Hematuria: No  Fatigue/Achiness: No  Sick/Strep Exposure: No  Therapies tried and outcome: None      Review of Systems  CONSTITUTIONAL: NEGATIVE for fever, chills, change in weight  ENT/MOUTH: postnasal drainage and sinus pressure  RESP: NEGATIVE for significant cough or SOB  CV: NEGATIVE for chest pain, palpitations or peripheral edema      Objective           Vitals:  No vitals were obtained today due to virtual visit.    Physical Exam   General: Alert and no distress " //Respiratory: No audible wheeze, cough, or shortness of breath // Psychiatric:  Appropriate affect, tone, and pace of words      Reviewed labs in EPIC      Phone call duration: 5 minutes  Signed Electronically by: TESFAYE Dietz CNP

## 2024-07-15 ENCOUNTER — PRE VISIT (OUTPATIENT)
Dept: SURGERY | Facility: CLINIC | Age: 64
End: 2024-07-15

## 2024-07-15 ENCOUNTER — THERAPY VISIT (OUTPATIENT)
Dept: PHYSICAL THERAPY | Facility: HOSPITAL | Age: 64
End: 2024-07-15
Attending: CHIROPRACTOR
Payer: COMMERCIAL

## 2024-07-15 DIAGNOSIS — M51.369 DDD (DEGENERATIVE DISC DISEASE), LUMBAR: Primary | ICD-10-CM

## 2024-07-15 PROCEDURE — 97035 APP MDLTY 1+ULTRASOUND EA 15: CPT | Mod: GP,CQ

## 2024-07-15 PROCEDURE — 97140 MANUAL THERAPY 1/> REGIONS: CPT | Mod: GP

## 2024-07-17 ENCOUNTER — THERAPY VISIT (OUTPATIENT)
Dept: PHYSICAL THERAPY | Facility: HOSPITAL | Age: 64
End: 2024-07-17
Attending: CHIROPRACTOR
Payer: COMMERCIAL

## 2024-07-17 DIAGNOSIS — M51.369 DDD (DEGENERATIVE DISC DISEASE), LUMBAR: Primary | ICD-10-CM

## 2024-07-17 PROCEDURE — 97140 MANUAL THERAPY 1/> REGIONS: CPT | Mod: GP,CQ

## 2024-07-17 PROCEDURE — 97035 APP MDLTY 1+ULTRASOUND EA 15: CPT | Mod: GP,CQ

## 2024-07-18 ENCOUNTER — THERAPY VISIT (OUTPATIENT)
Dept: PHYSICAL THERAPY | Facility: HOSPITAL | Age: 64
End: 2024-07-18
Attending: CHIROPRACTOR
Payer: COMMERCIAL

## 2024-07-18 DIAGNOSIS — M51.369 DDD (DEGENERATIVE DISC DISEASE), LUMBAR: Primary | ICD-10-CM

## 2024-07-18 PROCEDURE — 97035 APP MDLTY 1+ULTRASOUND EA 15: CPT | Mod: GP,CQ

## 2024-07-24 ENCOUNTER — HOSPITAL ENCOUNTER (OUTPATIENT)
Dept: MRI IMAGING | Facility: HOSPITAL | Age: 64
Discharge: HOME | End: 2024-07-24
Attending: PHYSICIAN ASSISTANT | Admitting: PHYSICIAN ASSISTANT
Payer: COMMERCIAL

## 2024-07-24 DIAGNOSIS — M54.12 CERVICAL RADICULOPATHY: ICD-10-CM

## 2024-07-24 PROCEDURE — 72141 MRI NECK SPINE W/O DYE: CPT

## 2024-07-30 ENCOUNTER — VIRTUAL VISIT (OUTPATIENT)
Dept: ORTHOPEDICS | Facility: CLINIC | Age: 64
End: 2024-07-30
Payer: COMMERCIAL

## 2024-07-30 VITALS — BODY MASS INDEX: 31.07 KG/M2 | WEIGHT: 205 LBS | HEIGHT: 68 IN

## 2024-07-30 DIAGNOSIS — M40.37 FLATBACK SYNDROME OF LUMBOSACRAL REGION: ICD-10-CM

## 2024-07-30 DIAGNOSIS — M47.27 LUMBOSACRAL SPONDYLOSIS WITH RADICULOPATHY: Primary | ICD-10-CM

## 2024-07-30 PROCEDURE — 99213 OFFICE O/P EST LOW 20 MIN: CPT | Mod: 95 | Performed by: ORTHOPAEDIC SURGERY

## 2024-07-30 ASSESSMENT — PAIN SCALES - GENERAL: PAINLEVEL: MODERATE PAIN (5)

## 2024-07-30 NOTE — LETTER
"7/30/2024      Zaira Naranjo  [address] [place] [address]      Dear Colleague,    Thank you for referring your patient, Zaira Naranjo, to the [facility]. Please see a copy of my visit note below.    VIRTUAL VISIT:  Patient is evaluated today via billable virtual visit.    The patient has been notified of following:   \"This virtual visit will be conducted via a call between you and your physician/provider. We have found that certain health care needs can be provided without the need for an in-person physical exam.  This service lets us provide the care you need with a virtual conversation.  If a prescription is necessary we can send it directly to your pharmacy.  If lab work is needed we can place an order for that and you can then stop by our lab to have the test done at a later time.  If during the course of the call the physician/provider feels a virtual visit is not appropriate, you will not be charged for this service.\"       Spine Surgical Hx:  2000 - Left hemilaminectomy-diskectomy L5-S1 ([facility], [place], [place]).  2006 - ACDF with plate fixation C6-7; ant ICBG harvest (Dr. Donnie Salmon, [place], [place]).      Physician has received verbal consent for a Virtual Visit from the patient.  Platform used:  Moviest[employer] Video  Time:  2:54pm to 3;14pm  Originating Location (pt. Location): Home  Distant Location (provider location):  [facility]     Chief Complaint   Patient presents with     RECHECK       Last Visit Date: 3/26/24  Previous Impression:  1.  Chronic lumbosacral back pain 2' to advanced spondylosis with disc collapse, vacuum disc signal and loss of segmental lordosis L5-S1.  2.  Chronic left S1 radiculopathy, with positive response to left S1 TFESI (2/27/2024, Fan Head PA-C)..  3.  Upper and lower lumbar flatback deformity (L4-S1 = 26 degrees, ideal 31; LL = 33 degrees, ideal 4-6) without significant sagittal imbalance (SVA = +0.3 " cm).  4.  History of left hemilaminectomy-diskectomy L5-S1 (2000).  5.  Left hip pain 2' to degenerative joint disease, with positive hip impingement test.  6.  Rule out left sacroiliac joint pain.  7.  History of significant postoperative nausea and vomiting.  Previous Plan:  - Case request: TLIF-SPO L5-S1; use of Extra-small kit Infuse BMP and allograft.  - PAC referral.  Of note, patient mentions she has significant problems with postoperative nausea and vomiting.  This almost makes her have second thoughts about having another surgery.  I told her to make sure she mentions this at her PAC visit, and our anesthesiology team will make sure that she is given appropriate medications to prevent nausea and vomiting.  - Lumbar CT - for surgical planning.      S>  64 year old female, above surgery scheduled on 8/21/24.    Requested for this appointment as she has some questions regarding upcoming surgery.  Wanted to also discussed the insurance denial of the bone graft (bone morphogenetic protein).  Had flareup of neck pain, and recently had cervical MRI 7/24/24.  Better over the past couple of days.Has PAC appt 8/2/24.    Previous injection:  2/27/24 - Left S1 TFESI (Fan Head PA-C).  Per report, pain completely relieved (back from 7/10 to 0; right leg from 3/10 to 0; left leg from 7/10 to 0).  Per patient, definitely hit the right spot.  She felt a fullness as soon as she got the injection, in [place].  They flew out to [place] on 3/2/24; she usually walks a lot in the beach when they go.  However, she thinks by that time, the effect had been wearing off.  This time, she was able to walk about  0.25 mile, but had to stop because the pain was getting really bad.     Oswestry (TONO) Questionnaire        7/30/2024     2:48 PM   OSWESTRY DISABILITY INDEX   Count 10   Sum 25   Oswestry Score (%) 50 %      TONO 2/22/24 42.5%  TONO 3/26/24 56%      PROMIS-10 Scores  Global Mental Health Score: (P) 12  Global Physical  Health Score: (P) 10  PROMIS TOTAL - SUBSCORES: (P) 22    Physical Examination:    This was a virtual visit, so very limited exam could be performed.  Patient seemed alert, oriented x 3, cooperative, with coherent speech, and not in distress.  Able to respond to questions appropriately and follow instructions.    Imaging:    Cervical MRI 7/24/24: Shows postsurgical change C6-7.  No significant residual stenosis at this level.  There is suprajacent spondylosis with some disc protrusion and mild canal stenosis C5-6.  2 levels above that, there is mild spondylolisthesis C3-4.  There is no spinal cord deformation or myelomalacia.    Assessment:    1.  Chronic lumbosacral back pain 2' to advanced spondylosis with disc collapse, vacuum disc signal and loss of segmental lordosis L5-S1.  2.  Chronic left S1 radiculopathy, with positive response to left S1 TFESI (2/27/2024, Fan Head PA-C)..  3.  Upper and lower lumbar flatback deformity (L4-S1 = 26 degrees, ideal 31; LL = 33 degrees, ideal 4-6) without significant sagittal imbalance (SVA = +0.3 cm).  4.  History of left hemilaminectomy-diskectomy L5-S1 (2000).  5.  Left hip pain 2' to degenerative joint disease, with positive hip impingement test.  6.  Rule out left sacroiliac joint pain.  7.  History of significant postoperative nausea and vomiting.    Plan:    Had good long discussion with patient  We discussed her upcoming surgery.  We discussed insurance denial.  I told her that although I initially requested for use of extra small dose of BMP, the surgery could also very well be done without BMP, and with it only being a single level fusion, I think the likelihood of obtaining solid arthrodesis is reasonably high, and I am comfortable proceeding without BMP.  We discussed that we will be using local autograft and crushed cancellous allograft (spinal graft technologies, [employer]).  We discussed her cervical spine.  She had a recent flareup of her neck, although  without significant trauma.  Her cervical MRI showed some degenerative changes above her previous fusion.  However, there is no severe canal stenosis, no spinal cord compression.  As such, I do not think that we need to cancel her lumbar spine and prioritize her cervical spine at this point.  Fortunately, her neck symptoms have improved over the past couple of days.    -  Need to get lumbar CT done.  Order previously placed.  -Keep her upcoming PAC appointment on 8/2/2024.  At that visit, she will make sure to mention to them about her difficult experience with prior general anesthesia, usually waking up with severe nausea and vomiting.  -We will proceed with surgery as scheduled on 8/21/2024.  Will plan to use only local autograft and crushed cancellous allograft.  TLIF-SPO L5-S1; use of local autograft and allograft.    >15 minutes spent on the date of the encounter doing chart review/review of outside records/review of test results/interpretation of tests/patient visit/documentation/discussion with other provider(s)/discussion with patient and family.    Jack Dumont MD    Orthopaedic Spine Surgery  Dept Orthopaedic Surgery, [facility]  [phone] office, [phone] pager  [email or web address]        Again, thank you for allowing me to participate in the care of your patient.        Sincerely,        Jack Dumont MD

## 2024-07-30 NOTE — PROGRESS NOTES
"Virtual Visit Details    Type of service:  Video Visit     Originating Location (pt. Location): {video visit patient location:026411::\"Home\"}  {PROVIDER LOCATION On-site should be selected for visits conducted from your clinic location or adjoining [facility] hospital, academic office, or other nearby [facility] building. Off-site should be selected for all other provider locations, including home:[address]12}  Distant Location (provider location):  {virtual location provider:746720}  Platform used for Video Visit: {Virtual Visit Platforms:489112::\"[employer]\"}    "

## 2024-07-30 NOTE — PROGRESS NOTES
"VIRTUAL VISIT:  Patient is evaluated today via billable virtual visit.    The patient has been notified of following:   \"This virtual visit will be conducted via a call between you and your physician/provider. We have found that certain health care needs can be provided without the need for an in-person physical exam.  This service lets us provide the care you need with a virtual conversation.  If a prescription is necessary we can send it directly to your pharmacy.  If lab work is needed we can place an order for that and you can then stop by our lab to have the test done at a later time.  If during the course of the call the physician/provider feels a virtual visit is not appropriate, you will not be charged for this service.\"       Spine Surgical Hx:  2000 - Left hemilaminectomy-diskectomy L5-S1 ([place], [place]).  2006 - ACDF with plate fixation C6-7; ant ICBG harvest (Dr. Donnie Salmon, [place], [place]).      Physician has received verbal consent for a Virtual Visit from the patient.  Platform used:  [employer] Video  Time:  2:54pm to 3;14pm  Originating Location (pt. Location): Home  Distant Location (provider location):  [facility] [place]     Chief Complaint   Patient presents with    RECHECK       Last Visit Date: 3/26/24  Previous Impression:  1.  Chronic lumbosacral back pain 2' to advanced spondylosis with disc collapse, vacuum disc signal and loss of segmental lordosis L5-S1.  2.  Chronic left S1 radiculopathy, with positive response to left S1 TFESI (2/27/2024, Fan Head PA-C)..  3.  Upper and lower lumbar flatback deformity (L4-S1 = 26 degrees, ideal 31; LL = 33 degrees, ideal 4-6) without significant sagittal imbalance (SVA = +0.3 cm).  4.  History of left hemilaminectomy-diskectomy L5-S1 (2000).  5.  Left hip pain 2' to degenerative joint disease, with positive hip impingement test.  6.  Rule out left sacroiliac joint pain.  7.  History of significant postoperative nausea " and vomiting.  Previous Plan:  - Case request: TLIF-SPO L5-S1; use of Extra-small kit Infuse BMP and allograft.  - PAC referral.  Of note, patient mentions she has significant problems with postoperative nausea and vomiting.  This almost makes her have second thoughts about having another surgery.  I told her to make sure she mentions this at her PAC visit, and our anesthesiology team will make sure that she is given appropriate medications to prevent nausea and vomiting.  - Lumbar CT - for surgical planning.      S>  64 year old female, above surgery scheduled on 8/21/24.    Requested for this appointment as she has some questions regarding upcoming surgery.  Wanted to also discussed the insurance denial of the bone graft (bone morphogenetic protein).  Had flareup of neck pain, and recently had cervical MRI 7/24/24.  Better over the past couple of days.Has PAC appt 8/2/24.    Previous injection:  2/27/24 - Left S1 TFESI (Fan Head PA-C).  Per report, pain completely relieved (back from 7/10 to 0; right leg from 3/10 to 0; left leg from 7/10 to 0).  Per patient, definitely hit the right spot.  She felt a fullness as soon as she got the injection, in [place].  They flew out to [place] on 3/2/24; she usually walks a lot in the beach when they go.  However, she thinks by that time, the effect had been wearing off.  This time, she was able to walk about  0.25 mile, but had to stop because the pain was getting really bad.     Oswestry (TONO) Questionnaire        7/30/2024     2:48 PM   OSWESTRY DISABILITY INDEX   Count 10   Sum 25   Oswestry Score (%) 50 %      TONO 2/22/24 42.5%  TONO 3/26/24 56%      PROMIS-10 Scores  Global Mental Health Score: (P) 12  Global Physical Health Score: (P) 10  PROMIS TOTAL - SUBSCORES: (P) 22    Physical Examination:    This was a virtual visit, so very limited exam could be performed.  Patient seemed alert, oriented x 3, cooperative, with coherent speech, and not in distress.   Able to respond to questions appropriately and follow instructions.    Imaging:    Cervical MRI 7/24/24: Shows postsurgical change C6-7.  No significant residual stenosis at this level.  There is suprajacent spondylosis with some disc protrusion and mild canal stenosis C5-6.  2 levels above that, there is mild spondylolisthesis C3-4.  There is no spinal cord deformation or myelomalacia.    Assessment:    1.  Chronic lumbosacral back pain 2' to advanced spondylosis with disc collapse, vacuum disc signal and loss of segmental lordosis L5-S1.  2.  Chronic left S1 radiculopathy, with positive response to left S1 TFESI (2/27/2024, Fan Head PA-C)..  3.  Upper and lower lumbar flatback deformity (L4-S1 = 26 degrees, ideal 31; LL = 33 degrees, ideal 4-6) without significant sagittal imbalance (SVA = +0.3 cm).  4.  History of left hemilaminectomy-diskectomy L5-S1 (2000).  5.  Left hip pain 2' to degenerative joint disease, with positive hip impingement test.  6.  Rule out left sacroiliac joint pain.  7.  History of significant postoperative nausea and vomiting.    Plan:    Had good long discussion with patient  We discussed her upcoming surgery.  We discussed insurance denial.  I told her that although I initially requested for use of extra small dose of BMP, the surgery could also very well be done without BMP, and with it only being a single level fusion, I think the likelihood of obtaining solid arthrodesis is reasonably high, and I am comfortable proceeding without BMP.  We discussed that we will be using local autograft and crushed cancellous allograft (spinal graft technologies, [employer]).  We discussed her cervical spine.  She had a recent flareup of her neck, although without significant trauma.  Her cervical MRI showed some degenerative changes above her previous fusion.  However, there is no severe canal stenosis, no spinal cord compression.  As such, I do not think that we need to cancel her lumbar spine and  prioritize her cervical spine at this point.  Fortunately, her neck symptoms have improved over the past couple of days.    -  Need to get lumbar CT done.  Order previously placed.  -Keep her upcoming PAC appointment on 8/2/2024.  At that visit, she will make sure to mention to them about her difficult experience with prior general anesthesia, usually waking up with severe nausea and vomiting.  -We will proceed with surgery as scheduled on 8/21/2024.  Will plan to use only local autograft and crushed cancellous allograft.  TLIF-SPO L5-S1; use of local autograft and allograft.    >15 minutes spent on the date of the encounter doing chart review/review of outside records/review of test results/interpretation of tests/patient visit/documentation/discussion with other provider(s)/discussion with patient and family.    Jack Dumont MD    Orthopaedic Spine Surgery  Dept Orthopaedic Surgery, [facility]  [phone] office, [phone] pager  [email or web address]

## 2024-07-30 NOTE — PROGRESS NOTES
"Virtual Visit Details    Type of service:  Video Visit     Originating Location (pt. Location): {video visit patient location:780735::\"Home\"}  {PROVIDER LOCATION On-site should be selected for visits conducted from your clinic location or adjoining [facility] hospital, academic office, or other nearby [facility] building. Off-site should be selected for all other provider locations, including home:281987}  Distant Location (provider location):  {virtual location provider:667070}  Platform used for Video Visit: {Virtual Visit Platforms:423580::\"[employer]\"}  "

## 2024-07-30 NOTE — NURSING NOTE
Current patient location:       Is the patient currently in the state of [place]? YES    Visit mode:VIDEO    If the visit is dropped, the patient can be reconnected by: VIDEO VISIT: Text to cell phone:   Telephone Information:   Mobile [phone]       Will anyone else be joining the visit? NO  (If patient encounters technical issues they should call [phone]    How would you like to obtain your AVS? MyChart    Are changes needed to the allergy or medication list? No    Are refills needed on medications prescribed by this physician? NO    Reason for visit: FIDE NEGRON

## 2024-07-31 ENCOUNTER — HOSPITAL ENCOUNTER (OUTPATIENT)
Dept: CT IMAGING | Facility: HOSPITAL | Age: 64
Discharge: HOME | End: 2024-07-31
Attending: ORTHOPAEDIC SURGERY | Admitting: ORTHOPAEDIC SURGERY
Payer: COMMERCIAL

## 2024-07-31 DIAGNOSIS — M40.37 FLATBACK SYNDROME OF LUMBOSACRAL REGION: ICD-10-CM

## 2024-07-31 DIAGNOSIS — M47.27 LUMBOSACRAL SPONDYLOSIS WITH RADICULOPATHY: ICD-10-CM

## 2024-07-31 PROCEDURE — 72131 CT LUMBAR SPINE W/O DYE: CPT

## 2024-08-01 LAB
ABO/RH(D): NORMAL
ANTIBODY SCREEN: NEGATIVE
SPECIMEN EXPIRATION DATE: NORMAL

## 2024-08-02 ENCOUNTER — APPOINTMENT (OUTPATIENT)
Dept: LAB | Facility: CLINIC | Age: 64
End: 2024-08-02
Payer: COMMERCIAL

## 2024-08-02 ENCOUNTER — OFFICE VISIT (OUTPATIENT)
Dept: SURGERY | Facility: CLINIC | Age: 64
End: 2024-08-02
Payer: COMMERCIAL

## 2024-08-02 ENCOUNTER — ANESTHESIA EVENT (OUTPATIENT)
Dept: SURGERY | Facility: CLINIC | Age: 64
DRG: 455 | End: 2024-08-02
Payer: COMMERCIAL

## 2024-08-02 ENCOUNTER — LAB (OUTPATIENT)
Dept: LAB | Facility: CLINIC | Age: 64
End: 2024-08-02
Payer: COMMERCIAL

## 2024-08-02 ENCOUNTER — PRE VISIT (OUTPATIENT)
Dept: SURGERY | Facility: CLINIC | Age: 64
End: 2024-08-02

## 2024-08-02 VITALS
TEMPERATURE: 97.7 F | HEART RATE: 64 BPM | HEIGHT: 68 IN | WEIGHT: 204 LBS | BODY MASS INDEX: 30.92 KG/M2 | DIASTOLIC BLOOD PRESSURE: 76 MMHG | OXYGEN SATURATION: 98 % | SYSTOLIC BLOOD PRESSURE: 113 MMHG

## 2024-08-02 DIAGNOSIS — M47.27 LUMBOSACRAL SPONDYLOSIS WITH RADICULOPATHY: ICD-10-CM

## 2024-08-02 DIAGNOSIS — D86.0 PULMONARY SARCOIDOSIS (H): ICD-10-CM

## 2024-08-02 DIAGNOSIS — Z01.818 PREOP EXAMINATION: ICD-10-CM

## 2024-08-02 DIAGNOSIS — Z01.818 PREOP EXAMINATION: Primary | ICD-10-CM

## 2024-08-02 LAB
ALBUMIN SERPL BCG-MCNC: 4.3 G/DL (ref 3.5–5.2)
ALP SERPL-CCNC: 79 U/L (ref 40–150)
ALT SERPL W P-5'-P-CCNC: 25 U/L (ref 0–50)
ANION GAP SERPL CALCULATED.3IONS-SCNC: 9 MMOL/L (ref 7–15)
AST SERPL W P-5'-P-CCNC: 31 U/L (ref 0–45)
BILIRUB SERPL-MCNC: 0.5 MG/DL
BUN SERPL-MCNC: 12.7 MG/DL (ref 8–23)
CALCIUM SERPL-MCNC: 9.6 MG/DL (ref 8.8–10.4)
CHLORIDE SERPL-SCNC: 98 MMOL/L (ref 98–107)
CREAT SERPL-MCNC: 0.97 MG/DL (ref 0.51–0.95)
EGFRCR SERPLBLD CKD-EPI 2021: 65 ML/MIN/1.73M2
ERYTHROCYTE [DISTWIDTH] IN BLOOD BY AUTOMATED COUNT: 13.2 % (ref 10–15)
GLUCOSE SERPL-MCNC: 109 MG/DL (ref 70–99)
HCO3 SERPL-SCNC: 28 MMOL/L (ref 22–29)
HCT VFR BLD AUTO: 40 % (ref 35–47)
HGB BLD-MCNC: 13.5 G/DL (ref 11.7–15.7)
MCH RBC QN AUTO: 31.2 PG (ref 26.5–33)
MCHC RBC AUTO-ENTMCNC: 33.8 G/DL (ref 31.5–36.5)
MCV RBC AUTO: 92 FL (ref 78–100)
PLATELET # BLD AUTO: 248 10E3/UL (ref 150–450)
POTASSIUM SERPL-SCNC: 4.7 MMOL/L (ref 3.4–5.3)
PROT SERPL-MCNC: 7.3 G/DL (ref 6.4–8.3)
RBC # BLD AUTO: 4.33 10E6/UL (ref 3.8–5.2)
SODIUM SERPL-SCNC: 135 MMOL/L (ref 135–145)
WBC # BLD AUTO: 5.3 10E3/UL (ref 4–11)

## 2024-08-02 PROCEDURE — 86900 BLOOD TYPING SEROLOGIC ABO: CPT

## 2024-08-02 PROCEDURE — 36415 COLL VENOUS BLD VENIPUNCTURE: CPT | Performed by: PATHOLOGY

## 2024-08-02 PROCEDURE — 85027 COMPLETE CBC AUTOMATED: CPT | Performed by: PATHOLOGY

## 2024-08-02 PROCEDURE — 80053 COMPREHEN METABOLIC PANEL: CPT | Performed by: PATHOLOGY

## 2024-08-02 PROCEDURE — 99203 OFFICE O/P NEW LOW 30 MIN: CPT | Performed by: NURSE PRACTITIONER

## 2024-08-02 RX ORDER — IBUPROFEN 200 MG
200 TABLET ORAL EVERY 4 HOURS PRN
Status: ON HOLD | COMMUNITY
End: 2024-08-23

## 2024-08-02 ASSESSMENT — PAIN SCALES - GENERAL: PAINLEVEL: MODERATE PAIN (5)

## 2024-08-02 ASSESSMENT — ENCOUNTER SYMPTOMS: ORTHOPNEA: 0

## 2024-08-02 ASSESSMENT — LIFESTYLE VARIABLES: TOBACCO_USE: 1

## 2024-08-02 NOTE — PATIENT INSTRUCTIONS
Preparing for Your Surgery      Name:  Zaira Naranjo   MRN:  [id number]   :  1960   Today's Date:  2024       Arriving for surgery:  Surgery date:  24  Arrival time:  7:15 am  Surgery time: 9:45 am    Please come to:     Please come to:       [facility] [address]   [address] [place]  [address]     The Green Ramp for patients and visitors is beneath the [facility]. The parking facility entrance is at the intersection of [address] [place] [address] and [address]. Patients and visitors who self-park will receive the reduced hospital parking rate (no ticket validation needed).     [employer] parking, located at the [facility] main entrance on [address] [place] [address], is available Monday - Friday from 7 am to 3:30 pm.     Discounted parking pass options can be purchased from  attendants during business hours.     -Check in at the security desk in the [facility] ([facility])   Lobby. They will direct you to the correct elevators.   -Proceed to the 3rd floor, Adult Surgery Waiting Lounge. [phone]     If you need directions, a wheelchair or escort please stop at the Information Desk in the lobby.  Inform the information person you are here for surgery; a wheelchair and escort to [address] will be provided.   An escort to the Adult Surgery Waiting Lounge will be provided. .    What can I eat or drink?  -  You may eat and drink normally up to 8 hours prior to arrival time. (Until 11:15 pm on 24)  -  You may have clear liquids until 2 hours prior to arrival time. (Until 5:15 am on 24)    Examples of clear liquids:  Water  Clear broth  Juices (apple, white grape, white cranberry  and cider) without pulp  Noncarbonated, powder based beverages  (lemonade and Srinivas-Aid)  Sodas (Sprite, 7-Up, ginger ale and seltzer)  Coffee or tea (without milk or  cream)  Gatorade    -  No Alcohol or cannabis products for at least 24 hours before surgery.     Which medicines can I take?    Hold Aspirin for 7 days before surgery.   Hold Multivitamins for 7 days before surgery.  Hold Supplements for 7 days before surgery.  Hold Ibuprofen (Advil, Motrin) for 1 day(s) before surgery--unless otherwise directed by surgeon.  Hold Naproxen (Aleve) for 4 days before surgery.  Hold Ketorolac (Toradol) for 1 day before surgery.    -  DO NOT take these medications the day of surgery:  Cetirizine (Zyrtec)  Chlorthalidone (Hygroton)  Hydroxyzine (Atarax)  Lisinopril (Zestril)  Topical medications    -  PLEASE TAKE these medications per your usual routine:  Albuterol inhaler if needed and bring this to the hospital  Allopurinol (Zyloprim)  Amitriptyline (Elavil)  Amlodipine (Norvasc)  Atorvastatin (Lipitor)  Cyclobenzaprine (Flexeril) if needed  Fluticasone (Flonase) nasal spray and you may bring this to the hospital  Metoprolol (Toprol)  Olopatadine (Patanol) eye drops and you may bring this to the hospital  Omeprazole (Prilosec)      How do I prepare myself?  - Please take 2 showers (one the night prior to surgery and one the morning of surgery) using Scrubcare or Hibiclens soap.    Use this soap only from the neck to your toes. Do not use in genital area.     Leave the soap on your skin for one minute--then rinse thoroughly.      You may use your own shampoo and conditioner. No other hair products.     Sleep in clean sheets and wear clean clothes.   - Please remove all jewelry and body piercings.  - No lotions, deodorants or fragrance.  - No makeup or fingernail polish.   - Bring your ID and insurance card.    -If you use a CPAP machine, please bring the CPAP machine, tubing, and mask to hospital.    -If you have a Deep Brain Stimulator, Spinal Cord Stimulator, or any Neuro Stimulator device---you must bring the remote control to the hospital.      ALL PATIENTS GOING HOME THE SAME DAY OF  SURGERY ARE REQUIRED TO HAVE A RESPONSIBLE ADULT TO DRIVE AND BE IN ATTENDANCE WITH THEM FOR 24 HOURS FOLLOWING SURGERY.    Covid testing policy as of 12/06/2022  Your surgeon will notify and schedule you for a COVID test if one is needed before surgery--please direct any questions or COVID symptoms to your surgeon      Questions or Concerns:    - For any questions regarding the day of surgery or your hospital stay, please contact the Pre Admission Nursing Office at [phone].       - If you have health changes between today and your surgery, please call your surgeon.       - For questions after surgery, please call your surgeons office.           Current Visitor Guidelines    You may have 2 visitors in the pre op area.    Visiting hours: 8 a.m. to 8:30 p.m.    Patients confirmed or suspected to have symptoms of COVID 19 or flu:     No visitors allowed for adult patients.   Children (under age 18) can have 1 named visitor.     People who are sick or showing symptoms of COVID 19 or flu:    Are not allowed to visit patients--we can only make exceptions in special situations.       Please follow these guidelines for your visit:          Please maintain social distance          Masking is optional--however at times you may be asked to wear a mask for the safety of yourself and others     Clean your hands with alcohol hand . Do this when you arrive at and leave the building and patient room,    And again after you touch your mask or anything in the room.     Go directly to and from the room you are visiting.     Stay in the patient s room during your visit. Limit going to other places in the hospital as much as possible     Leave bags and jackets at home or in the car.     For everyone s health, please don t come and go during your visit. That includes for smoking   during your visit.

## 2024-08-02 NOTE — RESULT ENCOUNTER NOTE
Salvador Meneses,    Your test results are attached.  Your labs are good for surgery.       Esther Bonilla DNP, RN, ANP-C

## 2024-08-02 NOTE — H&P
Pre-Operative H & P     CC:  Preoperative exam to assess for increased cardiopulmonary risk while undergoing surgery and anesthesia.    Date of Encounter: 8/2/2024  Primary Care Physician:  Evy Diaz     Reason for visit:   Encounter Diagnoses   Name Primary?    Preop examination Yes    Lumbosacral spondylosis with radiculopathy        HPI  Zaira Naranjo is a 64 year old female who presents for pre-operative H & P in preparation for  Procedure Information       Case: [id number] Date/Time: 08/21/24 0945    Procedure: Transforaminal lumbar interbody fusion with De La Cruz-Miller osteotomy lumbar 5-sacral 1; Use of Infuse bone morphogenetic protein and allograft. (Spine)    Anesthesia type: General    Diagnosis:       Lumbosacral spondylosis with radiculopathy [M47.27]      Flatback syndrome of lumbosacral region [M40.37]    Pre-op diagnosis:       Lumbosacral spondylosis with radiculopathy [M47.27]      Flatback syndrome of lumbosacral region [M40.37]    [facility]: [facility] / [facility]    Providers: Jack Dumont MD            Zaira Naranjo is a 64 year old female with hypertension, hyperlipidemia, history of smoking, allergic rhinitis, pulmonary sarcoidosis, obesity, fatty liver, GERD, and anxiety that has chronic low back pain, lumbosacral spondylosis with radiculopathy, and flatback syndrome of the lumbosacral region.  She notes that she has had one injection before and has done physical therapy, but continues to have low back pain and radicular symptoms in the LLE.  She notes that she doesn't tolerate narcotic pain medications well, so she doesn't take them.  She is currently managing with ibuprofen.  She has consulted with Dr. Dumont and the above listed procedure has been recommended for treatment.     History is obtained from the patient and chart review    Hx of abnormal bleeding or anti-platelet use: none    Menstrual history: No LMP recorded. Patient is postmenopausal.:      Past  Medical History  Past Medical History:   Diagnosis Date    Allergic rhinitis     Anxiety     GERD (gastroesophageal reflux disease)     Gout     History of smoking     HTN (hypertension)     Mixed hyperlipidemia     Obesity     PONV (postoperative nausea and vomiting)     Pulmonary sarcoidosis (H24)        Past Surgical History  Past Surgical History:   Procedure Laterality Date    EXCIS BARTHOLIN GLAND/CYST      fallopian tube reconstruction      x2    LAPAROSCOPY      SPINE SURGERY  2000    lumbar discectomy    SPINE SURGERY  2006    cervical fusion       Prior to Admission Medications  Current Outpatient Medications   Medication Sig Dispense Refill    albuterol (PROAIR HFA/PROVENTIL HFA/VENTOLIN HFA) 108 (90 Base) MCG/ACT inhaler Inhale 2 puffs into the lungs every 6 hours as needed for shortness of breath, wheezing or cough 18 g 0    allopurinol (ZYLOPRIM) 100 MG tablet Take 2 tablets (200 mg) by mouth daily (Patient taking differently: Take 100-200 mg by mouth every evening) 180 tablet 3    amitriptyline (ELAVIL) 25 MG tablet Take 1 tablet (25 mg) by mouth at bedtime 90 tablet 2    amLODIPine (NORVASC) 10 MG tablet Take 1 tablet (10 mg) by mouth daily (Patient taking differently: Take 10 mg by mouth every morning) 90 tablet 3    atorvastatin (LIPITOR) 20 MG tablet TAKE 1 TABLET BY MOUTH DAILY AT BEDTIME 90 tablet 1    cetirizine HCl (ZYRTEC ALLERGY) 10 MG CAPS Take 1 tablet by mouth daily      chlorthalidone (HYGROTON) 25 MG tablet 1/2 tablet daily (Patient taking differently: Take 12.5 mg by mouth every morning 1/2 tablet daily) 45 tablet 3    cyclobenzaprine (FLEXERIL) 10 MG tablet Take 1 tablet (10 mg) by mouth 2 times daily as needed for muscle spasms 15 tablet 0    fluticasone (FLONASE) 50 MCG/ACT nasal spray Spray 2 sprays in nostril daily 18.2 mL 11    hydrOXYzine HCl (ATARAX) 25 MG tablet TAKE 1 TABLET BY MOUTH 3 TIMES DAILY AS NEEDED FOR ANXIETY 30 tablet 4    ibuprofen (ADVIL/MOTRIN) 200 MG tablet  Take 200 mg by mouth every 4 hours as needed for pain      lisinopril (ZESTRIL) 40 MG tablet Take 1 tablet (40 mg) by mouth daily (Patient taking differently: Take 40 mg by mouth every morning) 90 tablet 3    metoprolol succinate ER (TOPROL XL) 100 MG 24 hr tablet TAKE 1 TABLET BY MOUTH DAILY. DO NOT CRUSH OR CHEW (Patient taking differently: 100 mg every morning TAKE 1 TABLET BY MOUTH DAILY. DO NOT CRUSH OR CHEW) 90 tablet 3    olopatadine (PATANOL) 0.1 % ophthalmic solution Place 1 drop into both eyes 2 times daily 5 mL 1    omeprazole (PRILOSEC) 20 MG DR capsule TAKE 1 CAPSULE BY MOUTH DAILY BEFORE A MEAL. DO NOT CRUSH. (Patient taking differently: 20 mg every morning TAKE 1 CAPSULE BY MOUTH DAILY BEFORE A MEAL. DO NOT CRUSH.) 90 capsule 3       Allergies  Allergies   Allergen Reactions    Cats     Other Environmental Allergy     Seasonal Allergies     Thimerosal (Thiomersal)     Hydromorphone Nausea    Ketorolac Tromethamine Nausea       Social History  Social History     Socioeconomic History    Marital status:      Spouse name: Not on file    Number of children: Not on file    Years of education: Not on file    Highest education level: Not on file   Occupational History    Occupation: human    Tobacco Use    Smoking status: Former     Current packs/day: 0.00     Types: Cigarettes     Quit date: 1995     Years since quittin.6     Passive exposure: Never    Smokeless tobacco: Never    Tobacco comments:     Quit     Vaping Use    Vaping status: Never Used   Substance and Sexual Activity    Alcohol use: Yes     Comment: 2 beers a week    Drug use: Yes     Types: Marijuana     Comment: THC daily for sleep    Sexual activity: Not on file   Other Topics Concern    Not on file   Social History Narrative    Not on file     Social Determinants of Health     Financial Resource Strain: Low Risk  (2024)    Financial Resource Strain     Within the past 12 months, have you or your  family members you live with been unable to get utilities (heat, electricity) when it was really needed?: No   Food Insecurity: Low Risk  (1/2/2024)    Food Insecurity     Within the past 12 months, did you worry that your food would run out before you got money to buy more?: No     Within the past 12 months, did the food you bought just not last and you didn t have money to get more?: No   Transportation Needs: Low Risk  (1/2/2024)    Transportation Needs     Within the past 12 months, has lack of transportation kept you from medical appointments, getting your medicines, non-medical meetings or appointments, work, or from getting things that you need?: No   Physical Activity: Not on file   Stress: Not on file   Social Connections: Not on file   Interpersonal Safety: Low Risk  (6/11/2024)    Interpersonal Safety     Do you feel physically and emotionally safe where you currently live?: Yes     Within the past 12 months, have you been hit, slapped, kicked or otherwise physically hurt by someone?: No     Within the past 12 months, have you been humiliated or emotionally abused in other ways by your partner or ex-partner?: No   Housing Stability: Low Risk  (1/2/2024)    Housing Stability     Do you have housing? : Yes     Are you worried about losing your housing?: No       Family History  Family History   Problem Relation Age of Onset    No Known Problems Mother     Lung Cancer Father     Anesthesia Reaction No family hx of     Thrombosis No family hx of        Review of Systems  The complete review of systems is negative other than noted in the HPI or here.   Anesthesia Evaluation   Pt has had prior anesthetic.     History of anesthetic complications  - PONV.      ROS/MED HX  ENT/Pulmonary: Comment: Non-active pulmonary sarcoidosis    (+)     DALE risk factors,  hypertension,    allergic rhinitis,     tobacco use, Past use,                    (-) recent URI   Neurologic:  - neg neurologic ROS     Cardiovascular:    "  (+) Dyslipidemia hypertension- -   -  - -                                 Previous cardiac testing   Echo: Date: 2020 Results:  Interpretation Summary   Qualitative ejection fraction is 65-70% (normal).   Normal right ventricular systolic function.   Mild aortic insufficiency.   Mild tricuspid regurgitation.   Doppler findings do not suggest pulmonary hypertension.   AI less than last echo.     Stress Test:  Date: 11/2023 Results:     The nuclear stress test is negative for inducible myocardial ischemia   or infarction.      Left ventricular function is normal.      The left ventricular ejection fraction at rest is 69%.  The left   ventricular ejection fraction at stress is 75%.      No prior exam is available for comparison.   ECG Reviewed:  Date: Results:    Cath:  Date: 2022 Results:  CT coronary  IMPRESSION: CT calcium score of 170 placing the patient at moderate  risk of significant coronary artery disease.   (-) SHANKAR and orthopnea/PND   METS/Exercise Tolerance: >4 METS Comment: Does AquaZumba twice per week for exercise.  Denies any exertional dyspnea or angina.   Hematologic:  - neg hematologic  ROS     Musculoskeletal: Comment: Chronic low back pain with LLE radicular pain - s/p 1 injection and physical therapy     Mid back pain due to bone spurs in the thoracic spine    S/p cervical spine fusion 2006      GI/Hepatic:     (+) GERD, Asymptomatic on medication,           liver disease (fatty liver),       Renal/Genitourinary:  - neg Renal ROS     Endo:     (+)               Obesity,       Psychiatric/Substance Use:     (+) psychiatric history anxiety       Infectious Disease:  - neg infectious disease ROS     Malignancy:  - neg malignancy ROS     Other:  - neg other ROS    (+)  , H/O Chronic Pain,         /76 (BP Location: Right arm, Patient Position: Sitting, Cuff Size: Adult Large)   Pulse 64   Temp 97.7  F (36.5  C) (Oral)   Ht 1.727 m (5' 8\")   Wt 92.5 kg (204 lb)   SpO2 98%   BMI 31.02 kg/m  "     Physical Exam   Constitutional: Awake, alert, cooperative, no apparent distress, and appears stated age. obese  Eyes: Pupils equal, round and reactive to light, extra ocular muscles intact, sclera clear, conjunctiva normal.  HENT: Normocephalic, oral pharynx with moist mucus membranes, good dentition. No goiter appreciated.   Respiratory: Clear to auscultation bilaterally, no crackles or wheezing.  Cardiovascular: Regular rate and rhythm, normal S1 and S2, and no murmur noted.  Carotids +2, no bruits. No edema. Palpable pulses to radial  DP and PT arteries.   GI: Normal bowel sounds, soft, non-distended, non-tender, no masses palpated, no hepatosplenomegaly.    Lymph/Hematologic: No cervical lymphadenopathy and no supraclavicular lymphadenopathy.  Genitourinary:  deferred  Skin: Warm and dry.  No rashes at anticipated surgical site.   Musculoskeletal: Full ROM of neck. There is no redness, warmth, or swelling of the exposed joints. Gross motor strength is normal.    Neurologic: Awake, alert, oriented to name, place and time. Cranial nerves II-XII are grossly intact. Gait is normal.   Neuropsychiatric: Calm, cooperative. Normal affect.     Prior Labs/Diagnostic Studies   All labs and imaging personally reviewed     EKG/ stress test - if available please see in ROS above           The patient's records and results personally reviewed by this provider.     Outside records reviewed from: Care Everywhere    LAB/DIAGNOSTIC STUDIES TODAY:    Component      Latest Ref Rng 8/2/2024  11:55 AM   Sodium      135 - 145 mmol/L 135    Potassium      3.4 - 5.3 mmol/L 4.7    Carbon Dioxide (CO2)      22 - 29 mmol/L 28    Anion Gap      7 - 15 mmol/L 9    Urea Nitrogen      8.0 - 23.0 mg/dL 12.7    Creatinine      0.51 - 0.95 mg/dL 0.97 (H)    GFR Estimate      >60 mL/min/1.73m2 65    Calcium      8.8 - 10.4 mg/dL 9.6    Chloride      98 - 107 mmol/L 98    Glucose      70 - 99 mg/dL 109 (H)    Alkaline Phosphatase      40 - 150  "U/L 79    AST      0 - 45 U/L 31    ALT      0 - 50 U/L 25    Protein Total      6.4 - 8.3 g/dL 7.3    Albumin      3.5 - 5.2 g/dL 4.3    Bilirubin Total      <=1.2 mg/dL 0.5    WBC      4.0 - 11.0 10e3/uL 5.3    RBC Count      3.80 - 5.20 10e6/uL 4.33    Hemoglobin      11.7 - 15.7 g/dL 13.5    Hematocrit      35.0 - 47.0 % 40.0    MCV      78 - 100 fL 92    MCH      26.5 - 33.0 pg 31.2    MCHC      31.5 - 36.5 g/dL 33.8    RDW      10.0 - 15.0 % 13.2    Platelet Count      150 - 450 10e3/uL 248       Legend:  (H) High    Assessment    Zaira Naranjo is a 64 year old female seen as a PAC referral for risk assessment and optimization for anesthesia.    Plan/Recommendations  Pt will be optimized for the proposed procedure.  See below for details on the assessment, risk, and preoperative recommendations    NEUROLOGY  - No history of TIA, CVA or seizure    -Post Op delirium risk factors:  No risk identified    ENT  - No current airway concerns.  Will need to be reassessed day of surgery.    **s/p cervical fusion in 2006.  Patient requests precaution with her neck positioning and intubation procedure. **    Mallampati: I  TM: > 3    CARDIAC  - No history of CAD and Afib  - prior cardiac testing as above.  - hold lisinopril DOS    - METS (Metabolic Equivalents)  Patient performs 4 or more METS exercise without symptoms             Total Score: 0      RCRI-Very low risk: Class 1 0.4% complication rate             Total Score: 0        PULMONARY  - patient reports having \"inactive\" pulmonary sarcoidosis.  Denies any respiratory symptoms.  No longer following with pulmonology.  See last chest CT from January 2024 in imaging.    DALE Low Risk             Total Score: 2    DALE: Hypertension    DALE: Over 50 ys old      - Denies asthma or COPD  - Tobacco History    History   Smoking Status    Former    Types: Cigarettes   Smokeless Tobacco    Never       GI  - GERD is well controlled with omeprazole.   - patient notes a " "history of severe PONV.  Will order aprepitant for DOS.   PONV High Risk  Total Score: 4           1 AN PONV: Pt is Female    1 AN PONV: Patient is not a current smoker    1 AN PONV: Patient has history of PONV    1 AN PONV: Intended Post Op Opioids            ENDOCRINE    - BMI: Estimated body mass index is 31.02 kg/m  as calculated from the following:    Height as of this encounter: 1.727 m (5' 8\").    Weight as of this encounter: 92.5 kg (204 lb).  Obesity (BMI >30)  - No history of Diabetes Mellitus    HEME  VTE Low Risk 0.26%             Total Score: 0      - No history of abnormal bleeding or antiplatelet use.      MSK  Patient IS Frail             Total Score: 3    Frailty: Decrease in strength    Frailty: Increased exhaustion    Frailty: Overall lack of energy      Patient declines referral to PM&R.     - chronic low back pain with LLE radicular symptoms.  Surgery planned as above.     PSYCH  - hold hydroxyzine DOS      Different anesthesia methods/types have been discussed with the patient, but they are aware that the final plan will be decided by the assigned anesthesia provider on the date of service.      The patient is optimized for their procedure. AVS with information on surgery time/arrival time, meds and NPO status given by nursing staff. No further diagnostic testing indicated.      On the day of service:     Prep time: 11 minutes  Visit time: 21 minutes  Documentation time: 10 minutes  ------------------------------------------  Total time: 42 minutes      TESFAYE Reyes CNP  Preoperative Assessment Center  [facility]  Phone: [phone]  Fax: [phone]    "

## 2024-08-21 ENCOUNTER — ANESTHESIA (OUTPATIENT)
Dept: SURGERY | Facility: CLINIC | Age: 64
DRG: 455 | End: 2024-08-21
Payer: COMMERCIAL

## 2024-08-21 ENCOUNTER — HOSPITAL ENCOUNTER (INPATIENT)
Facility: CLINIC | Age: 64
LOS: 2 days | Discharge: HOME | DRG: 455 | End: 2024-08-23
Attending: ORTHOPAEDIC SURGERY | Admitting: ORTHOPAEDIC SURGERY
Payer: COMMERCIAL

## 2024-08-21 ENCOUNTER — APPOINTMENT (OUTPATIENT)
Dept: GENERAL RADIOLOGY | Facility: CLINIC | Age: 64
DRG: 455 | End: 2024-08-21
Attending: ORTHOPAEDIC SURGERY
Payer: COMMERCIAL

## 2024-08-21 ENCOUNTER — DOCUMENTATION ONLY (OUTPATIENT)
Dept: ORTHOPEDICS | Facility: CLINIC | Age: 64
End: 2024-08-21

## 2024-08-21 DIAGNOSIS — Z98.890 STATUS POST LUMBAR SPINE SURGERY FOR DECOMPRESSION OF SPINAL CORD: Primary | ICD-10-CM

## 2024-08-21 LAB
ABO/RH(D): NORMAL
BLOOD BANK CHART COMMENT: NORMAL
GLUCOSE BLDC GLUCOMTR-MCNC: 121 MG/DL (ref 70–99)
SPECIMEN EXPIRATION DATE: NORMAL
SPECIMEN EXPIRATION DATE: NORMAL

## 2024-08-21 PROCEDURE — 250N000012 HC RX MED GY IP 250 OP 636 PS 637: Performed by: STUDENT IN AN ORGANIZED HEALTH CARE EDUCATION/TRAINING PROGRAM

## 2024-08-21 PROCEDURE — 22633 ARTHRD CMBN 1NTRSPC LUMBAR: CPT | Mod: 22 | Performed by: ORTHOPAEDIC SURGERY

## 2024-08-21 PROCEDURE — 258N000003 HC RX IP 258 OP 636: Performed by: PHYSICIAN ASSISTANT

## 2024-08-21 PROCEDURE — C1713 ANCHOR/SCREW BN/BN,TIS/BN: HCPCS | Performed by: ORTHOPAEDIC SURGERY

## 2024-08-21 PROCEDURE — 250N000011 HC RX IP 250 OP 636: Performed by: STUDENT IN AN ORGANIZED HEALTH CARE EDUCATION/TRAINING PROGRAM

## 2024-08-21 PROCEDURE — 0SG30AJ FUSION OF LUMBOSACRAL JOINT WITH INTERBODY FUSION DEVICE, POSTERIOR APPROACH, ANTERIOR COLUMN, OPEN APPROACH: ICD-10-PCS | Performed by: ORTHOPAEDIC SURGERY

## 2024-08-21 PROCEDURE — 272N000001 HC OR GENERAL SUPPLY STERILE: Performed by: ORTHOPAEDIC SURGERY

## 2024-08-21 PROCEDURE — 370N000017 HC ANESTHESIA TECHNICAL FEE, PER MIN: Performed by: ORTHOPAEDIC SURGERY

## 2024-08-21 PROCEDURE — 22214 INCIS 1 VERTEBRAL SEG LUMBAR: CPT | Mod: 22 | Performed by: ORTHOPAEDIC SURGERY

## 2024-08-21 PROCEDURE — 22840 INSERT SPINE FIXATION DEVICE: CPT | Performed by: ORTHOPAEDIC SURGERY

## 2024-08-21 PROCEDURE — 22853 INSJ BIOMECHANICAL DEVICE: CPT | Performed by: ORTHOPAEDIC SURGERY

## 2024-08-21 PROCEDURE — 250N000013 HC RX MED GY IP 250 OP 250 PS 637: Performed by: PHYSICIAN ASSISTANT

## 2024-08-21 PROCEDURE — 8E0WXBZ COMPUTER ASSISTED PROCEDURE OF TRUNK REGION: ICD-10-PCS | Performed by: ORTHOPAEDIC SURGERY

## 2024-08-21 PROCEDURE — 250N000009 HC RX 250: Performed by: NURSE ANESTHETIST, CERTIFIED REGISTERED

## 2024-08-21 PROCEDURE — 250N000009 HC RX 250: Performed by: PHYSICIAN ASSISTANT

## 2024-08-21 PROCEDURE — 22840 INSERT SPINE FIXATION DEVICE: CPT | Performed by: STUDENT IN AN ORGANIZED HEALTH CARE EDUCATION/TRAINING PROGRAM

## 2024-08-21 PROCEDURE — 258N000003 HC RX IP 258 OP 636: Performed by: NURSE ANESTHETIST, CERTIFIED REGISTERED

## 2024-08-21 PROCEDURE — 250N000011 HC RX IP 250 OP 636: Performed by: NURSE ANESTHETIST, CERTIFIED REGISTERED

## 2024-08-21 PROCEDURE — 22840 INSERT SPINE FIXATION DEVICE: CPT | Performed by: NURSE ANESTHETIST, CERTIFIED REGISTERED

## 2024-08-21 PROCEDURE — 999N000180 XR SURGERY CARM FLUORO LESS THAN 5 MIN: Mod: TC

## 2024-08-21 PROCEDURE — 250N000011 HC RX IP 250 OP 636: Performed by: PHYSICIAN ASSISTANT

## 2024-08-21 PROCEDURE — 360N000086 HC SURGERY LEVEL 6 W/ FLUORO, PER MIN: Performed by: ORTHOPAEDIC SURGERY

## 2024-08-21 PROCEDURE — 250N000013 HC RX MED GY IP 250 OP 250 PS 637: Performed by: STUDENT IN AN ORGANIZED HEALTH CARE EDUCATION/TRAINING PROGRAM

## 2024-08-21 PROCEDURE — 61783 SCAN PROC SPINAL: CPT | Performed by: ORTHOPAEDIC SURGERY

## 2024-08-21 PROCEDURE — 0SG3071 FUSION OF LUMBOSACRAL JOINT WITH AUTOLOGOUS TISSUE SUBSTITUTE, POSTERIOR APPROACH, POSTERIOR COLUMN, OPEN APPROACH: ICD-10-PCS | Performed by: ORTHOPAEDIC SURGERY

## 2024-08-21 PROCEDURE — P9045 ALBUMIN (HUMAN), 5%, 250 ML: HCPCS | Performed by: NURSE ANESTHETIST, CERTIFIED REGISTERED

## 2024-08-21 PROCEDURE — 250N000011 HC RX IP 250 OP 636

## 2024-08-21 PROCEDURE — 36415 COLL VENOUS BLD VENIPUNCTURE: CPT | Performed by: STUDENT IN AN ORGANIZED HEALTH CARE EDUCATION/TRAINING PROGRAM

## 2024-08-21 PROCEDURE — 86900 BLOOD TYPING SEROLOGIC ABO: CPT | Performed by: STUDENT IN AN ORGANIZED HEALTH CARE EDUCATION/TRAINING PROGRAM

## 2024-08-21 PROCEDURE — 250N000025 HC SEVOFLURANE, PER MIN: Performed by: ORTHOPAEDIC SURGERY

## 2024-08-21 PROCEDURE — 999N000141 HC STATISTIC PRE-PROCEDURE NURSING ASSESSMENT: Performed by: ORTHOPAEDIC SURGERY

## 2024-08-21 PROCEDURE — 250N000009 HC RX 250: Performed by: ORTHOPAEDIC SURGERY

## 2024-08-21 PROCEDURE — C1889 IMPLANT/INSERT DEVICE, NOC: HCPCS | Performed by: ORTHOPAEDIC SURGERY

## 2024-08-21 PROCEDURE — 20936 SP BONE AGRFT LOCAL ADD-ON: CPT | Performed by: ORTHOPAEDIC SURGERY

## 2024-08-21 PROCEDURE — 120N000002 HC R&B MED SURG/OB UMMC

## 2024-08-21 PROCEDURE — 99222 1ST HOSP IP/OBS MODERATE 55: CPT | Performed by: PHYSICIAN ASSISTANT

## 2024-08-21 PROCEDURE — 0SB40ZZ EXCISION OF LUMBOSACRAL DISC, OPEN APPROACH: ICD-10-PCS | Performed by: ORTHOPAEDIC SURGERY

## 2024-08-21 PROCEDURE — C1762 CONN TISS, HUMAN(INC FASCIA): HCPCS | Performed by: ORTHOPAEDIC SURGERY

## 2024-08-21 PROCEDURE — 250N000009 HC RX 250: Performed by: STUDENT IN AN ORGANIZED HEALTH CARE EDUCATION/TRAINING PROGRAM

## 2024-08-21 PROCEDURE — 710N000010 HC RECOVERY PHASE 1, LEVEL 2, PER MIN: Performed by: ORTHOPAEDIC SURGERY

## 2024-08-21 DEVICE — IMP ROD MEDT SOLERA CVD 5.5X35MM TI 1553201035: Type: IMPLANTABLE DEVICE | Site: SPINE LUMBAR | Status: FUNCTIONAL

## 2024-08-21 DEVICE — IMP SCR SET MEDT SOLERA BREAK OFF 5.5MM TI 5540030: Type: IMPLANTABLE DEVICE | Site: SPINE LUMBAR | Status: FUNCTIONAL

## 2024-08-21 DEVICE — IMPLANTABLE DEVICE: Type: IMPLANTABLE DEVICE | Site: SPINE LUMBAR | Status: FUNCTIONAL

## 2024-08-21 DEVICE — IMP SCR MEDT 5.5/6.0MM SOLERA 7.5X45MM MA 55840007545: Type: IMPLANTABLE DEVICE | Site: SPINE LUMBAR | Status: FUNCTIONAL

## 2024-08-21 DEVICE — GRAFT BN CANC 15CC CRSH 1-10MM 800103: Type: IMPLANTABLE DEVICE | Site: SPINE LUMBAR | Status: FUNCTIONAL

## 2024-08-21 DEVICE — IMP SCR MEDT 5.5/6.0MM SOLERA 7.5X50MM MA 55840007550: Type: IMPLANTABLE DEVICE | Site: SPINE LUMBAR | Status: FUNCTIONAL

## 2024-08-21 RX ORDER — FENTANYL CITRATE 50 UG/ML
25 INJECTION, SOLUTION INTRAMUSCULAR; INTRAVENOUS EVERY 5 MIN PRN
Status: DISCONTINUED | OUTPATIENT
Start: 2024-08-21 | End: 2024-08-21 | Stop reason: HOSPADM

## 2024-08-21 RX ORDER — SODIUM CHLORIDE, SODIUM LACTATE, POTASSIUM CHLORIDE, CALCIUM CHLORIDE 600; 310; 30; 20 MG/100ML; MG/100ML; MG/100ML; MG/100ML
INJECTION, SOLUTION INTRAVENOUS CONTINUOUS
Status: DISCONTINUED | OUTPATIENT
Start: 2024-08-21 | End: 2024-08-21 | Stop reason: HOSPADM

## 2024-08-21 RX ORDER — NALOXONE HYDROCHLORIDE 0.4 MG/ML
0.4 INJECTION, SOLUTION INTRAMUSCULAR; INTRAVENOUS; SUBCUTANEOUS
Status: DISCONTINUED | OUTPATIENT
Start: 2024-08-21 | End: 2024-08-23 | Stop reason: HOSPADM

## 2024-08-21 RX ORDER — PROCHLORPERAZINE MALEATE 10 MG
10 TABLET ORAL EVERY 6 HOURS PRN
Status: DISCONTINUED | OUTPATIENT
Start: 2024-08-21 | End: 2024-08-23 | Stop reason: HOSPADM

## 2024-08-21 RX ORDER — ALLOPURINOL 100 MG/1
200 TABLET ORAL DAILY
Status: DISCONTINUED | OUTPATIENT
Start: 2024-08-21 | End: 2024-08-23 | Stop reason: HOSPADM

## 2024-08-21 RX ORDER — ONDANSETRON 2 MG/ML
4 INJECTION INTRAMUSCULAR; INTRAVENOUS EVERY 30 MIN PRN
Status: DISCONTINUED | OUTPATIENT
Start: 2024-08-21 | End: 2024-08-21 | Stop reason: HOSPADM

## 2024-08-21 RX ORDER — NALOXONE HYDROCHLORIDE 0.4 MG/ML
0.2 INJECTION, SOLUTION INTRAMUSCULAR; INTRAVENOUS; SUBCUTANEOUS
Status: DISCONTINUED | OUTPATIENT
Start: 2024-08-21 | End: 2024-08-23 | Stop reason: HOSPADM

## 2024-08-21 RX ORDER — GABAPENTIN 100 MG/1
300 CAPSULE ORAL
Status: COMPLETED | OUTPATIENT
Start: 2024-08-21 | End: 2024-08-21

## 2024-08-21 RX ORDER — MORPHINE SULFATE 2 MG/ML
1 INJECTION, SOLUTION INTRAMUSCULAR; INTRAVENOUS
Status: DISCONTINUED | OUTPATIENT
Start: 2024-08-21 | End: 2024-08-23 | Stop reason: HOSPADM

## 2024-08-21 RX ORDER — ACETAMINOPHEN 325 MG/1
650 TABLET ORAL EVERY 4 HOURS PRN
Status: DISCONTINUED | OUTPATIENT
Start: 2024-08-24 | End: 2024-08-23 | Stop reason: HOSPADM

## 2024-08-21 RX ORDER — FENTANYL CITRATE 50 UG/ML
50 INJECTION, SOLUTION INTRAMUSCULAR; INTRAVENOUS EVERY 5 MIN PRN
Status: DISCONTINUED | OUTPATIENT
Start: 2024-08-21 | End: 2024-08-21 | Stop reason: HOSPADM

## 2024-08-21 RX ORDER — MORPHINE SULFATE 2 MG/ML
2 INJECTION, SOLUTION INTRAMUSCULAR; INTRAVENOUS
Status: ACTIVE | OUTPATIENT
Start: 2024-08-21 | End: 2024-08-21

## 2024-08-21 RX ORDER — HYDROMORPHONE HYDROCHLORIDE 1 MG/ML
0.4 INJECTION, SOLUTION INTRAMUSCULAR; INTRAVENOUS; SUBCUTANEOUS
Status: DISCONTINUED | OUTPATIENT
Start: 2024-08-21 | End: 2024-08-21

## 2024-08-21 RX ORDER — HYDROMORPHONE HYDROCHLORIDE 1 MG/ML
0.2 INJECTION, SOLUTION INTRAMUSCULAR; INTRAVENOUS; SUBCUTANEOUS
Status: DISCONTINUED | OUTPATIENT
Start: 2024-08-21 | End: 2024-08-21

## 2024-08-21 RX ORDER — DIAZEPAM 10 MG/2ML
2.5 INJECTION, SOLUTION INTRAMUSCULAR; INTRAVENOUS
Status: DISCONTINUED | OUTPATIENT
Start: 2024-08-21 | End: 2024-08-21 | Stop reason: HOSPADM

## 2024-08-21 RX ORDER — OXYCODONE HYDROCHLORIDE 10 MG/1
10 TABLET ORAL EVERY 4 HOURS PRN
Status: DISCONTINUED | OUTPATIENT
Start: 2024-08-21 | End: 2024-08-22

## 2024-08-21 RX ORDER — ONDANSETRON 2 MG/ML
INJECTION INTRAMUSCULAR; INTRAVENOUS PRN
Status: DISCONTINUED | OUTPATIENT
Start: 2024-08-21 | End: 2024-08-21

## 2024-08-21 RX ORDER — LISINOPRIL 40 MG/1
40 TABLET ORAL EVERY MORNING
Status: DISCONTINUED | OUTPATIENT
Start: 2024-08-22 | End: 2024-08-23 | Stop reason: HOSPADM

## 2024-08-21 RX ORDER — DEXAMETHASONE SODIUM PHOSPHATE 4 MG/ML
4 INJECTION, SOLUTION INTRA-ARTICULAR; INTRALESIONAL; INTRAMUSCULAR; INTRAVENOUS; SOFT TISSUE
Status: DISCONTINUED | OUTPATIENT
Start: 2024-08-21 | End: 2024-08-21 | Stop reason: HOSPADM

## 2024-08-21 RX ORDER — POLYETHYLENE GLYCOL 3350 17 G/17G
17 POWDER, FOR SOLUTION ORAL DAILY
Status: DISCONTINUED | OUTPATIENT
Start: 2024-08-22 | End: 2024-08-23 | Stop reason: HOSPADM

## 2024-08-21 RX ORDER — HYDROMORPHONE HYDROCHLORIDE 1 MG/ML
0.4 INJECTION, SOLUTION INTRAMUSCULAR; INTRAVENOUS; SUBCUTANEOUS EVERY 5 MIN PRN
Status: DISCONTINUED | OUTPATIENT
Start: 2024-08-21 | End: 2024-08-21 | Stop reason: HOSPADM

## 2024-08-21 RX ORDER — FENTANYL CITRATE 50 UG/ML
INJECTION, SOLUTION INTRAMUSCULAR; INTRAVENOUS PRN
Status: DISCONTINUED | OUTPATIENT
Start: 2024-08-21 | End: 2024-08-21

## 2024-08-21 RX ORDER — ACETAMINOPHEN 325 MG/1
975 TABLET ORAL ONCE
Status: DISCONTINUED | OUTPATIENT
Start: 2024-08-21 | End: 2024-08-21 | Stop reason: HOSPADM

## 2024-08-21 RX ORDER — ATORVASTATIN CALCIUM 20 MG/1
20 TABLET, FILM COATED ORAL AT BEDTIME
Status: DISCONTINUED | OUTPATIENT
Start: 2024-08-21 | End: 2024-08-23 | Stop reason: HOSPADM

## 2024-08-21 RX ORDER — OLOPATADINE HYDROCHLORIDE 1 MG/ML
1 SOLUTION/ DROPS OPHTHALMIC 2 TIMES DAILY
Status: DISCONTINUED | OUTPATIENT
Start: 2024-08-21 | End: 2024-08-23 | Stop reason: HOSPADM

## 2024-08-21 RX ORDER — ONDANSETRON 4 MG/1
4 TABLET, ORALLY DISINTEGRATING ORAL EVERY 30 MIN PRN
Status: DISCONTINUED | OUTPATIENT
Start: 2024-08-21 | End: 2024-08-21 | Stop reason: HOSPADM

## 2024-08-21 RX ORDER — SODIUM CHLORIDE, SODIUM LACTATE, POTASSIUM CHLORIDE, CALCIUM CHLORIDE 600; 310; 30; 20 MG/100ML; MG/100ML; MG/100ML; MG/100ML
INJECTION, SOLUTION INTRAVENOUS CONTINUOUS PRN
Status: DISCONTINUED | OUTPATIENT
Start: 2024-08-21 | End: 2024-08-21

## 2024-08-21 RX ORDER — LIDOCAINE 40 MG/G
CREAM TOPICAL
Status: DISCONTINUED | OUTPATIENT
Start: 2024-08-21 | End: 2024-08-23 | Stop reason: HOSPADM

## 2024-08-21 RX ORDER — ACETAMINOPHEN 325 MG/1
975 TABLET ORAL EVERY 8 HOURS
Status: DISCONTINUED | OUTPATIENT
Start: 2024-08-21 | End: 2024-08-23 | Stop reason: HOSPADM

## 2024-08-21 RX ORDER — HYDROMORPHONE HYDROCHLORIDE 1 MG/ML
0.2 INJECTION, SOLUTION INTRAMUSCULAR; INTRAVENOUS; SUBCUTANEOUS EVERY 5 MIN PRN
Status: DISCONTINUED | OUTPATIENT
Start: 2024-08-21 | End: 2024-08-21 | Stop reason: HOSPADM

## 2024-08-21 RX ORDER — SCOLOPAMINE TRANSDERMAL SYSTEM 1 MG/1
1 PATCH, EXTENDED RELEASE TRANSDERMAL
Status: DISCONTINUED | OUTPATIENT
Start: 2024-08-21 | End: 2024-08-23 | Stop reason: HOSPADM

## 2024-08-21 RX ORDER — HYDRALAZINE HYDROCHLORIDE 20 MG/ML
2.5-5 INJECTION INTRAMUSCULAR; INTRAVENOUS EVERY 10 MIN PRN
Status: DISCONTINUED | OUTPATIENT
Start: 2024-08-21 | End: 2024-08-21 | Stop reason: HOSPADM

## 2024-08-21 RX ORDER — CEFAZOLIN SODIUM 2 G/100ML
2 INJECTION, SOLUTION INTRAVENOUS EVERY 8 HOURS
Status: COMPLETED | OUTPATIENT
Start: 2024-08-21 | End: 2024-08-22

## 2024-08-21 RX ORDER — APREPITANT 40 MG/1
40 CAPSULE ORAL ONCE
Status: COMPLETED | OUTPATIENT
Start: 2024-08-21 | End: 2024-08-21

## 2024-08-21 RX ORDER — EPHEDRINE SULFATE 50 MG/ML
INJECTION, SOLUTION INTRAMUSCULAR; INTRAVENOUS; SUBCUTANEOUS PRN
Status: DISCONTINUED | OUTPATIENT
Start: 2024-08-21 | End: 2024-08-21

## 2024-08-21 RX ORDER — NALOXONE HYDROCHLORIDE 0.4 MG/ML
0.1 INJECTION, SOLUTION INTRAMUSCULAR; INTRAVENOUS; SUBCUTANEOUS
Status: DISCONTINUED | OUTPATIENT
Start: 2024-08-21 | End: 2024-08-21 | Stop reason: HOSPADM

## 2024-08-21 RX ORDER — DEXAMETHASONE SODIUM PHOSPHATE 4 MG/ML
INJECTION, SOLUTION INTRA-ARTICULAR; INTRALESIONAL; INTRAMUSCULAR; INTRAVENOUS; SOFT TISSUE PRN
Status: DISCONTINUED | OUTPATIENT
Start: 2024-08-21 | End: 2024-08-21

## 2024-08-21 RX ORDER — ALBUTEROL SULFATE 90 UG/1
2 AEROSOL, METERED RESPIRATORY (INHALATION) EVERY 6 HOURS PRN
Status: DISCONTINUED | OUTPATIENT
Start: 2024-08-21 | End: 2024-08-23 | Stop reason: HOSPADM

## 2024-08-21 RX ORDER — ONDANSETRON 2 MG/ML
4 INJECTION INTRAMUSCULAR; INTRAVENOUS EVERY 6 HOURS PRN
Status: DISCONTINUED | OUTPATIENT
Start: 2024-08-21 | End: 2024-08-23 | Stop reason: HOSPADM

## 2024-08-21 RX ORDER — ACETAMINOPHEN 325 MG/1
975 TABLET ORAL ONCE
Status: COMPLETED | OUTPATIENT
Start: 2024-08-21 | End: 2024-08-21

## 2024-08-21 RX ORDER — PROPOFOL 10 MG/ML
INJECTION, EMULSION INTRAVENOUS PRN
Status: DISCONTINUED | OUTPATIENT
Start: 2024-08-21 | End: 2024-08-21

## 2024-08-21 RX ORDER — MAGNESIUM HYDROXIDE 1200 MG/15ML
LIQUID ORAL PRN
Status: DISCONTINUED | OUTPATIENT
Start: 2024-08-21 | End: 2024-08-21 | Stop reason: HOSPADM

## 2024-08-21 RX ORDER — METHOCARBAMOL 750 MG/1
750 TABLET, FILM COATED ORAL EVERY 6 HOURS PRN
Status: DISCONTINUED | OUTPATIENT
Start: 2024-08-21 | End: 2024-08-23 | Stop reason: HOSPADM

## 2024-08-21 RX ORDER — CYCLOBENZAPRINE HCL 10 MG
10 TABLET ORAL EVERY 8 HOURS PRN
Status: DISCONTINUED | OUTPATIENT
Start: 2024-08-21 | End: 2024-08-23 | Stop reason: HOSPADM

## 2024-08-21 RX ORDER — ACETAMINOPHEN 325 MG/1
975 TABLET ORAL ONCE
Status: DISCONTINUED | OUTPATIENT
Start: 2024-08-21 | End: 2024-08-21

## 2024-08-21 RX ORDER — LIDOCAINE HYDROCHLORIDE ANHYDROUS AND DEXTROSE MONOHYDRATE .8; 5 G/100ML; G/100ML
1 INJECTION, SOLUTION INTRAVENOUS CONTINUOUS
Status: DISCONTINUED | OUTPATIENT
Start: 2024-08-21 | End: 2024-08-21

## 2024-08-21 RX ORDER — AMOXICILLIN 250 MG
1 CAPSULE ORAL 2 TIMES DAILY
Status: DISCONTINUED | OUTPATIENT
Start: 2024-08-21 | End: 2024-08-23 | Stop reason: HOSPADM

## 2024-08-21 RX ORDER — OXYCODONE HYDROCHLORIDE 5 MG/1
5 TABLET ORAL EVERY 4 HOURS PRN
Status: DISCONTINUED | OUTPATIENT
Start: 2024-08-21 | End: 2024-08-22

## 2024-08-21 RX ORDER — ONDANSETRON 4 MG/1
4 TABLET, ORALLY DISINTEGRATING ORAL EVERY 6 HOURS PRN
Status: DISCONTINUED | OUTPATIENT
Start: 2024-08-21 | End: 2024-08-23 | Stop reason: HOSPADM

## 2024-08-21 RX ORDER — AMLODIPINE BESYLATE 5 MG/1
10 TABLET ORAL DAILY
Status: DISCONTINUED | OUTPATIENT
Start: 2024-08-22 | End: 2024-08-23 | Stop reason: HOSPADM

## 2024-08-21 RX ORDER — KETAMINE HYDROCHLORIDE 10 MG/ML
INJECTION INTRAMUSCULAR; INTRAVENOUS PRN
Status: DISCONTINUED | OUTPATIENT
Start: 2024-08-21 | End: 2024-08-21

## 2024-08-21 RX ORDER — METOPROLOL SUCCINATE 50 MG/1
100 TABLET, EXTENDED RELEASE ORAL EVERY MORNING
Status: DISCONTINUED | OUTPATIENT
Start: 2024-08-22 | End: 2024-08-23 | Stop reason: HOSPADM

## 2024-08-21 RX ORDER — MORPHINE SULFATE 2 MG/ML
2 INJECTION, SOLUTION INTRAMUSCULAR; INTRAVENOUS
Status: DISCONTINUED | OUTPATIENT
Start: 2024-08-21 | End: 2024-08-23 | Stop reason: HOSPADM

## 2024-08-21 RX ORDER — LABETALOL HYDROCHLORIDE 5 MG/ML
10 INJECTION, SOLUTION INTRAVENOUS
Status: DISCONTINUED | OUTPATIENT
Start: 2024-08-21 | End: 2024-08-21 | Stop reason: HOSPADM

## 2024-08-21 RX ORDER — HYDROXYZINE HYDROCHLORIDE 25 MG/1
25 TABLET, FILM COATED ORAL 3 TIMES DAILY PRN
Status: DISCONTINUED | OUTPATIENT
Start: 2024-08-21 | End: 2024-08-23 | Stop reason: HOSPADM

## 2024-08-21 RX ORDER — BISACODYL 10 MG
10 SUPPOSITORY, RECTAL RECTAL DAILY PRN
Status: DISCONTINUED | OUTPATIENT
Start: 2024-08-24 | End: 2024-08-23 | Stop reason: HOSPADM

## 2024-08-21 RX ORDER — HALOPERIDOL 5 MG/ML
1 INJECTION INTRAMUSCULAR
Status: DISCONTINUED | OUTPATIENT
Start: 2024-08-21 | End: 2024-08-21 | Stop reason: HOSPADM

## 2024-08-21 RX ORDER — LIDOCAINE HYDROCHLORIDE ANHYDROUS AND DEXTROSE MONOHYDRATE .8; 5 G/100ML; G/100ML
1 INJECTION, SOLUTION INTRAVENOUS CONTINUOUS
Status: DISCONTINUED | OUTPATIENT
Start: 2024-08-21 | End: 2024-08-21 | Stop reason: HOSPADM

## 2024-08-21 RX ORDER — FAMOTIDINE 20 MG/1
20 TABLET, FILM COATED ORAL 2 TIMES DAILY
Status: DISCONTINUED | OUTPATIENT
Start: 2024-08-21 | End: 2024-08-23 | Stop reason: HOSPADM

## 2024-08-21 RX ORDER — CEFAZOLIN SODIUM/WATER 2 G/20 ML
2 SYRINGE (ML) INTRAVENOUS
Status: COMPLETED | OUTPATIENT
Start: 2024-08-21 | End: 2024-08-21

## 2024-08-21 RX ORDER — SCOLOPAMINE TRANSDERMAL SYSTEM 1 MG/1
1 PATCH, EXTENDED RELEASE TRANSDERMAL
Status: DISCONTINUED | OUTPATIENT
Start: 2024-08-21 | End: 2024-08-21 | Stop reason: HOSPADM

## 2024-08-21 RX ORDER — LIDOCAINE 40 MG/G
CREAM TOPICAL
Status: DISCONTINUED | OUTPATIENT
Start: 2024-08-21 | End: 2024-08-21 | Stop reason: HOSPADM

## 2024-08-21 RX ORDER — LIDOCAINE HYDROCHLORIDE 20 MG/ML
INJECTION, SOLUTION INFILTRATION; PERINEURAL PRN
Status: DISCONTINUED | OUTPATIENT
Start: 2024-08-21 | End: 2024-08-21

## 2024-08-21 RX ORDER — LIDOCAINE HYDROCHLORIDE ANHYDROUS AND DEXTROSE MONOHYDRATE .8; 5 G/100ML; G/100ML
0.5 INJECTION, SOLUTION INTRAVENOUS CONTINUOUS
Status: DISCONTINUED | OUTPATIENT
Start: 2024-08-21 | End: 2024-08-23

## 2024-08-21 RX ORDER — MAGNESIUM SULFATE HEPTAHYDRATE 40 MG/ML
2 INJECTION, SOLUTION INTRAVENOUS ONCE
Status: COMPLETED | OUTPATIENT
Start: 2024-08-21 | End: 2024-08-21

## 2024-08-21 RX ORDER — PROPOFOL 10 MG/ML
INJECTION, EMULSION INTRAVENOUS CONTINUOUS PRN
Status: DISCONTINUED | OUTPATIENT
Start: 2024-08-21 | End: 2024-08-21

## 2024-08-21 RX ORDER — CEFAZOLIN SODIUM/WATER 2 G/20 ML
2 SYRINGE (ML) INTRAVENOUS SEE ADMIN INSTRUCTIONS
Status: DISCONTINUED | OUTPATIENT
Start: 2024-08-21 | End: 2024-08-21 | Stop reason: HOSPADM

## 2024-08-21 RX ORDER — GABAPENTIN 100 MG/1
300 CAPSULE ORAL
Status: DISCONTINUED | OUTPATIENT
Start: 2024-08-21 | End: 2024-08-21 | Stop reason: HOSPADM

## 2024-08-21 RX ORDER — PANTOPRAZOLE SODIUM 40 MG/1
40 TABLET, DELAYED RELEASE ORAL
Status: DISCONTINUED | OUTPATIENT
Start: 2024-08-22 | End: 2024-08-23 | Stop reason: HOSPADM

## 2024-08-21 RX ADMIN — PHENYLEPHRINE HYDROCHLORIDE 0.3 MCG/KG/MIN: 10 INJECTION INTRAVENOUS at 10:58

## 2024-08-21 RX ADMIN — SUGAMMADEX 200 MG: 100 INJECTION, SOLUTION INTRAVENOUS at 13:34

## 2024-08-21 RX ADMIN — MORPHINE SULFATE 2 MG: 2 INJECTION, SOLUTION INTRAMUSCULAR; INTRAVENOUS at 15:39

## 2024-08-21 RX ADMIN — PROPOFOL 100 MG: 10 INJECTION, EMULSION INTRAVENOUS at 10:26

## 2024-08-21 RX ADMIN — SODIUM CHLORIDE, POTASSIUM CHLORIDE, SODIUM LACTATE AND CALCIUM CHLORIDE: 600; 310; 30; 20 INJECTION, SOLUTION INTRAVENOUS at 12:03

## 2024-08-21 RX ADMIN — OLOPATADINE HYDROCHLORIDE 1 DROP: 1 SOLUTION OPHTHALMIC at 23:56

## 2024-08-21 RX ADMIN — EPHEDRINE SULFATE 10 MG: 5 INJECTION INTRAVENOUS at 12:08

## 2024-08-21 RX ADMIN — DEXMEDETOMIDINE HYDROCHLORIDE 12 MCG: 100 INJECTION, SOLUTION INTRAVENOUS at 13:25

## 2024-08-21 RX ADMIN — GABAPENTIN 300 MG: 300 CAPSULE ORAL at 09:23

## 2024-08-21 RX ADMIN — FAMOTIDINE 20 MG: 20 INJECTION, SOLUTION INTRAVENOUS at 19:59

## 2024-08-21 RX ADMIN — PROPOFOL 30 MCG/KG/MIN: 10 INJECTION, EMULSION INTRAVENOUS at 10:58

## 2024-08-21 RX ADMIN — MAGNESIUM SULFATE HEPTAHYDRATE 2 G: 40 INJECTION, SOLUTION INTRAVENOUS at 10:58

## 2024-08-21 RX ADMIN — FENTANYL CITRATE 100 MCG: 50 INJECTION INTRAMUSCULAR; INTRAVENOUS at 10:23

## 2024-08-21 RX ADMIN — MORPHINE SULFATE 2 MG: 2 INJECTION, SOLUTION INTRAMUSCULAR; INTRAVENOUS at 21:32

## 2024-08-21 RX ADMIN — Medication 50 MG: at 10:28

## 2024-08-21 RX ADMIN — DEXMEDETOMIDINE HYDROCHLORIDE 8 MCG: 100 INJECTION, SOLUTION INTRAVENOUS at 12:58

## 2024-08-21 RX ADMIN — MIDAZOLAM 2 MG: 1 INJECTION INTRAMUSCULAR; INTRAVENOUS at 10:12

## 2024-08-21 RX ADMIN — EPHEDRINE SULFATE 10 MG: 5 INJECTION INTRAVENOUS at 12:18

## 2024-08-21 RX ADMIN — PHENYLEPHRINE HYDROCHLORIDE 100 MCG: 10 INJECTION INTRAVENOUS at 10:43

## 2024-08-21 RX ADMIN — PHENYLEPHRINE HYDROCHLORIDE 100 MCG: 10 INJECTION INTRAVENOUS at 11:24

## 2024-08-21 RX ADMIN — CEFAZOLIN SODIUM 2 G: 2 INJECTION, SOLUTION INTRAVENOUS at 18:47

## 2024-08-21 RX ADMIN — PHENYLEPHRINE HYDROCHLORIDE 100 MCG: 10 INJECTION INTRAVENOUS at 11:33

## 2024-08-21 RX ADMIN — LIDOCAINE HYDROCHLORIDE 100 MG: 20 INJECTION, SOLUTION INFILTRATION; PERINEURAL at 10:23

## 2024-08-21 RX ADMIN — PHENYLEPHRINE HYDROCHLORIDE 100 MCG: 10 INJECTION INTRAVENOUS at 12:22

## 2024-08-21 RX ADMIN — ACETAMINOPHEN 975 MG: 325 TABLET, FILM COATED ORAL at 09:23

## 2024-08-21 RX ADMIN — HYDROMORPHONE HYDROCHLORIDE 0.5 MG: 1 INJECTION, SOLUTION INTRAMUSCULAR; INTRAVENOUS; SUBCUTANEOUS at 13:54

## 2024-08-21 RX ADMIN — ONDANSETRON 4 MG: 2 INJECTION INTRAMUSCULAR; INTRAVENOUS at 10:12

## 2024-08-21 RX ADMIN — SODIUM CHLORIDE, POTASSIUM CHLORIDE, SODIUM LACTATE AND CALCIUM CHLORIDE: 600; 310; 30; 20 INJECTION, SOLUTION INTRAVENOUS at 10:12

## 2024-08-21 RX ADMIN — ACETAMINOPHEN 975 MG: 325 TABLET ORAL at 18:08

## 2024-08-21 RX ADMIN — LIDOCAINE HYDROCHLORIDE 1 MG/KG/HR: 8 INJECTION, SOLUTION INTRAVENOUS at 10:58

## 2024-08-21 RX ADMIN — PHENYLEPHRINE HYDROCHLORIDE 100 MCG: 10 INJECTION INTRAVENOUS at 11:46

## 2024-08-21 RX ADMIN — DEXAMETHASONE SODIUM PHOSPHATE 8 MG: 4 INJECTION, SOLUTION INTRAMUSCULAR; INTRAVENOUS at 12:14

## 2024-08-21 RX ADMIN — Medication 10 MG: at 11:18

## 2024-08-21 RX ADMIN — EPHEDRINE SULFATE 5 MG: 5 INJECTION INTRAVENOUS at 12:03

## 2024-08-21 RX ADMIN — LIDOCAINE HYDROCHLORIDE 0.5 MG/KG/HR: 8 INJECTION, SOLUTION INTRAVENOUS at 15:44

## 2024-08-21 RX ADMIN — ALBUMIN HUMAN: 0.05 INJECTION, SOLUTION INTRAVENOUS at 12:10

## 2024-08-21 RX ADMIN — SCOPALAMINE 1 PATCH: 1 PATCH, EXTENDED RELEASE TRANSDERMAL at 09:25

## 2024-08-21 RX ADMIN — ROCURONIUM BROMIDE 5 MCG/KG/MIN: 10 INJECTION, SOLUTION INTRAVENOUS at 11:48

## 2024-08-21 RX ADMIN — PHENYLEPHRINE HYDROCHLORIDE 0.6 MCG/KG/MIN: 10 INJECTION INTRAVENOUS at 11:57

## 2024-08-21 RX ADMIN — TRANEXAMIC ACID 2688 MG: 1 INJECTION, SOLUTION INTRAVENOUS at 10:58

## 2024-08-21 RX ADMIN — HYDROMORPHONE HYDROCHLORIDE 0.5 MG: 1 INJECTION, SOLUTION INTRAMUSCULAR; INTRAVENOUS; SUBCUTANEOUS at 13:28

## 2024-08-21 RX ADMIN — OXYCODONE HYDROCHLORIDE 5 MG: 5 TABLET ORAL at 18:08

## 2024-08-21 RX ADMIN — Medication 2 G: at 10:21

## 2024-08-21 RX ADMIN — PROCHLORPERAZINE EDISYLATE 10 MG: 5 INJECTION INTRAMUSCULAR; INTRAVENOUS at 20:46

## 2024-08-21 RX ADMIN — PROPOFOL 100 MG: 10 INJECTION, EMULSION INTRAVENOUS at 10:21

## 2024-08-21 RX ADMIN — Medication 30 MG: at 10:21

## 2024-08-21 RX ADMIN — Medication 10 MG: at 12:11

## 2024-08-21 RX ADMIN — PHENYLEPHRINE HYDROCHLORIDE 100 MCG: 10 INJECTION INTRAVENOUS at 10:23

## 2024-08-21 RX ADMIN — ALBUMIN HUMAN: 0.05 INJECTION, SOLUTION INTRAVENOUS at 12:31

## 2024-08-21 RX ADMIN — ONDANSETRON 4 MG: 2 INJECTION INTRAMUSCULAR; INTRAVENOUS at 19:49

## 2024-08-21 RX ADMIN — MORPHINE SULFATE 2 MG: 2 INJECTION, SOLUTION INTRAMUSCULAR; INTRAVENOUS at 23:51

## 2024-08-21 RX ADMIN — TRANEXAMIC ACID 10 MG/KG/HR: 1 INJECTION, SOLUTION INTRAVENOUS at 11:14

## 2024-08-21 RX ADMIN — APREPITANT 40 MG: 40 CAPSULE ORAL at 09:22

## 2024-08-21 ASSESSMENT — ACTIVITIES OF DAILY LIVING (ADL)
EATING/SWALLOWING: OTHER (SEE COMMENTS)
ADLS_ACUITY_SCORE: 23
HEARING_DIFFICULTY_OR_DEAF: NO
DIFFICULTY_EATING/SWALLOWING: YES
ADLS_ACUITY_SCORE: 18
ADLS_ACUITY_SCORE: 23
ADLS_ACUITY_SCORE: 18
ADLS_ACUITY_SCORE: 23
DOING_ERRANDS_INDEPENDENTLY_DIFFICULTY: NO
DRESSING/BATHING_DIFFICULTY: NO
ADLS_ACUITY_SCORE: 23
ADLS_ACUITY_SCORE: 18
ADLS_ACUITY_SCORE: 18
CONCENTRATING,_REMEMBERING_OR_MAKING_DECISIONS_DIFFICULTY: NO
ADLS_ACUITY_SCORE: 18
ADLS_ACUITY_SCORE: 18
WALKING_OR_CLIMBING_STAIRS_DIFFICULTY: NO
DIFFICULTY_COMMUNICATING: NO
ADLS_ACUITY_SCORE: 23
FALL_HISTORY_WITHIN_LAST_SIX_MONTHS: NO
ADLS_ACUITY_SCORE: 18
VISION_MANAGEMENT: GLASSES
ADLS_ACUITY_SCORE: 18
ADLS_ACUITY_SCORE: 18
WEAR_GLASSES_OR_BLIND: YES
TOILETING_ISSUES: NO
CHANGE_IN_FUNCTIONAL_STATUS_SINCE_ONSET_OF_CURRENT_ILLNESS/INJURY: NO

## 2024-08-21 ASSESSMENT — LIFESTYLE VARIABLES: TOBACCO_USE: 1

## 2024-08-21 ASSESSMENT — ENCOUNTER SYMPTOMS: ORTHOPNEA: 0

## 2024-08-21 NOTE — ANESTHESIA POSTPROCEDURE EVALUATION
Patient: Zaira Naranjo    Procedure: Procedure(s):  Transforaminal Lumbar Interbody Fusion with De La Cruz-Miller Osteotomy Lumbar 5-Sacral 1; Use of Infuse Bone Morphogenetic Protein and Allograft       Anesthesia Type:  General    Note:  Disposition: Admission   Postop Pain Control: Uneventful            Sign Out: Well controlled pain   PONV: No   Neuro/Psych: Uneventful            Sign Out: Acceptable/Baseline neuro status   Airway/Respiratory: Uneventful            Sign Out: Acceptable/Baseline resp. status   CV/Hemodynamics: Uneventful            Sign Out: Acceptable CV status; No obvious hypovolemia; No obvious fluid overload   Other NRE: NONE   DID A NON-ROUTINE EVENT OCCUR? No           Last vitals:  Vitals Value Taken Time   /57 08/21/24 1500   Temp 36.8  C (98.2  F) 08/21/24 1405   Pulse 77 08/21/24 1504   Resp 12 08/21/24 1504   SpO2 96 % 08/21/24 1504   Vitals shown include unfiled device data.    Electronically Signed By: Ivette Goldstein MD  August 21, 2024  3:05 PM

## 2024-08-21 NOTE — ANESTHESIA PROCEDURE NOTES
Airway       Patient location during procedure: OR       Procedure Start/Stop Times: 8/21/2024 10:26 AM  Staff -        CRNA: Deepika Landeros APRN CRNA       Performed By: CRNA  Consent for Airway        Urgency: elective  Indications and Patient Condition       Indications for airway management: tory-procedural       Induction type:intravenous       Mask difficulty assessment: 1 - vent by mask    Final Airway Details       Final airway type: endotracheal airway       Successful airway: ETT - single  Endotracheal Airway Details        ETT size (mm): 7.5       Cuffed: yes       Successful intubation technique: video laryngoscopy       VL Blade Size: Glidescope 3       Grade View of Cords: 1       Adjucts: stylet       Position: Left       Measured from: gums/teeth       Secured at (cm): 22       Bite block used: Soft    Post intubation assessment        Placement verified by: capnometry, equal breath sounds and chest rise        Number of attempts at approach: 1       Number of other approaches attempted: 0       Secured with: tape       Ease of procedure: easy       Dentition: Intact and Unchanged    Medication(s) Administered   Medication Administration Time: 8/21/2024 10:26 AM

## 2024-08-21 NOTE — PROGRESS NOTES
Received Completed forms Yes   Faxed Forms Faxed To: [employer]  Fax Number: [phone]   Sent to HIM (Date) 8/21/24

## 2024-08-21 NOTE — OP NOTE
Date of Service:  8/21/2024       Surgeon:  Jack Dumont MD   Assistant:  Alejo Saba DO, Spine Fellow.  No qualified resident available.  Dr. Saba's assistance was deemed necessary given case complexity, and for all parts of the procedure, including positioning, exposure, performing the surgical procedure, and closure.      Preoperative Diagnosis:     1.  Chronic lumbosacral back pain 2' to advanced spondylosis with disc collapse, vacuum disc signal and loss of segmental lordosis L5-S1.  2.  Chronic left S1 radiculopathy, with positive response to left S1 TFESI (2/27/2024, [place] PA-C)..  3.  Upper and lower lumbar flatback deformity (L4-S1 = 26 degrees, ideal 31; LL = 33 degrees, ideal 4-6) without significant sagittal imbalance (SVA = +0.3 cm).  4.  History of left hemilaminectomy-diskectomy L5-S1 (2000).  5.  Left hip pain 2' to degenerative joint disease, with positive hip impingement test.  6.  Rule out left sacroiliac joint pain.  7.  History of significant postoperative nausea and vomiting.      Postoperative Diagnosis:     Same      Procedures:   De La Cruz-Miller (Schwab Gr 2) osteotomy L5-S1.  Transforaminal lumbar interbody fusion (TLIF) via bilateral facetectomies and diskectomies L5-S1.  Use of local autograft for interbody fusion L5-S1.  Placement of interbody device (bilateral titanium coated peek cages) L5-S1.  Bilateral segmental pedicle screw fixation L5-S1.  Posterior spinal fusion L5-S1, using local autograft and crushed cancellous allograft.  Computer navigation using O-arm and Stealth.         A-22 modifier is justified for use in this case given previous surgery at same level, with resultant significant paraspinal and epidural tissue scarring.  Also with class I obesity.  All these factors necessitated significant extra time and effort greater than 25% usual in performing the procedure, including exposure, revision decompression, osteotomy, posterior interbody fusion and  instrumentation.  Anesthesia:  General endotracheal.   Local anesthetic:  N/A; patient received intraoperative IV lidocaine infusion.  EBL: 150 mL  Complications:  None apparent.   Table position change prior to barron placement:  From 10 to 10 (0 degree arc).  Implants / Equipment used:   Medtronic Solera screw system: L5 = 7.5 x 50 mm bilateral; S1 = 7.5 x 45 mm bilateral.  5.5 mm x 35 mm titanium precut pre-bent rods x 2.  Medtronic Capstone Control titanium-coated PEEK (PTC) cages: L5-S1 = 11 mm height by 22 mm length 12 degree lordosis x 2 cages.  TXA high dose (30/10).  Vancomycin powder 1 gm deep.  Crushed cancellous allograft 15 mL.      Indications:  64 year old female with previous left sided decompression and discectomy L5-S1.  Presented with ongoing low back and radicular leg pains.  Imaging revealed advanced spondylosis with disc collapse, loss of lordosis and recurrent stenosis L5-S1.  Tried nonoperative treatment, continues to have significant disabling symptoms.  I thus offered surgery in form of single level revision decompression, posterior column osteotomy and interbody fusion L5-S1.  Consented after thorough discussion of the rationale, risks, benefits and alternatives.  Discussed bone graft options.  We initially agreed to use infuse BMP; however, this was denied by insurance.  Thus, we agreed to use local autograft and crushed cancellous allograft.    I performed neurologic exam in the preop area: Motor 5/5 strength for all muscle groups in both lower extremities.  Intact sensation to light touch all dermatomes both lower extremities.    Details:  Properly identified in preop area, site marked, informed consent signed.  Wheeled to OR.  Brief earlier performed.  General anesthesia administered.  Zuñiga inserted.  Antibiotic given: Cefazolin 2 g IV.  TXA started.  Positioned prone on Pro-Axis table.  Table flexed then deg to facilitate exposure, screw placement and decompression.  Lumbar region  squared off, prepped with Chlorhexidine, alcohol and ChloraPrep, and draped in sterile fashion. Timeout performed.    Midline skin incision made; bilateral subperiosteal exposure L5-S1.  Utilized identification of previous hemilaminectomy defect with significant scarring to identify L5-S1.  Thus, radiologist verification not warranted.  Confirmatory timeout performed.  Spinous process clamp with ramos frame applied at S1.  3D scan obtained L5-S1 for navigation purposes.      Pedicle screws placed using navigation bilateral L5 and S1.  Starting points identified using landmarks.   holes created using barry.  Tracks created using ramos awl.  Stealth used to select screw sizes.  Undertapped by 1 size using ramos tap.  Screws inserted using ramos .  Check scan showed good placement of all screws.    De La Cruz-Miller osteotomy (SPO) and transforaminal lumbar interbody fusion (TLIF) performed L5-S1.  Interspinous ligament removed using rongeur.  Resected bilateral descending processes using barry and osteotome.  Bone saved for grafting purposes.  Bilateral ascending processes resected using osteotome and Kerrison punches, flush to pedicles.  Bleeders controlled using bipolar, Surgiflo and cottonoids.  Ligamentum flavum resected using Kerrisons.  Achieved complete bilateral and central dorsal decompression.  TLIF performed.  Bilateral rectangular annulotomies and diskectomies performed.  Disc space release obtained using blunt paddle distractors.  Endplates prepared using down and up-angled curettes.  Lateral posterior annulus further released using Gene curettes; made sure to protect/avoid the exiting nerve root.  Disc material removed using Blakesley rongeur.  Palpated canal to ensure adequate ventral decompression.  Placed 12 degree trial spacers.  Cage height selected; cages filled with local autograft.  Impacted left-sided cage first; packed ~ 6 mL local bone from contralateral side, prior to impacting contralateral  cage.  Ensured good cage position on lateral and AP images.  Packed further local bone lateral and posterior to the cages as able.    Selected appropriate barron lengths; elected to use 5.5 mm titanium rods.  The pre-bent rods were further manually contoured using Somali melgar.  Rods seated, set plugs applied at the caudal level S1.  S1 screw heads tilted dorsally prior to final tightening, thus leaving the proximal end of the rods sitting up dorsally relative to the L5 screw heads.  We then used Beil reduction towers to reduce the rods to the L5 screw heads, thus increasing segmental lordosis without performing compression across the rods.  L5 set plugs applied and final tightened.  Final AP-lat images showed good alignment and implant placement.     Irrigation.  Posterior fusion performed L5-S1.  Posterior elements (lamina and spinous processes) decorticated using barry.  Bone graft (local autograft and crushed cancellous allograft) placed over decorticated posterior fusion bed.    Deep medium Hemovac drain placed.  Vanco powder applied 1 g deep.  Layered closure: #1 vicryl popoffs for deep fascia, running 0 vicryl for deep subq, 2-0 Vicryl for subq and 3-0 Monocryl.  Skin glue and sterile dressings applied.  Turned supine, taken off general anesthetic, transferred to PACU in satisfactory condition.       Postop:  Admit to surgical floor.  Antibiotics x 24 hrs.  Mechanical DVT prophylaxis.  Hospitalist medical co-management.  Brace: No brace.  PT and OT consult.  ADAT.  No lifting more than 10 pounds, no excessive bending or twisting.  Lumbar AP-lat standing x-rays prior to discharge.  Anticipate discharge in 2-3 days.  RTC 6 weeks with EOS full-spine AP-lat x-rays.

## 2024-08-21 NOTE — BRIEF OP NOTE
Brief Operative Note    Preop Dx:   Lumbosacral spondylosis with radiculopathy [M47.27]  Flatback syndrome of lumbosacral region [M40.37]  Post op Dx:   Same  Procedure:    Procedure(s):  Transforaminal Lumbar Interbody Fusion with De La Cruz-Miller Osteotomy Lumbar 5-Sacral 1; Use of Infuse Bone Morphogenetic Protein and Allograft  Surgeon:     Dr. Dumont   Assistants:    Alejo Saba MD  Anesthesia:   General  EBL:    Refer to op note  Total IV Fluids:  See Anesthesia Record  Specimens:   None  Findings:   See Operative Dictation  Complications:  None.    Assessment and Plan: Zaira Naranjo is a 64 year old female now s/p above procedure on 8/21/24 with Dr. Dumont.     Dr. Dumont Primary  Activity:   - Up with assist until independent. No excessive bending or twisting. No lifting >10 lbs x 6 weeks.   Weight bearing status: WBAT.  Pain management:   - Transition from IV to PO narcotics as tolerated. No NSAIDs x 3 months.   Antibiotics: Ancef x 24 hours.  Diet: Begin with clear fluids and progress diet as tolerated.   DVT prophylaxis: SCDs only. No chemical DVT ppx needed.  Imaging: XR Upright Lumbar PTDC - ordered.  Labs: Hgb POD#1.  Bracing/Splinting: None.  Dressings: Keep Aquacel c/d/i x 7 days.  Drains: Hemovac, Document output per shift, will be discontinued at Orthopedic Surgery discretion.  Zuñiga catheter: Remove POD#1.  Physical Therapy/Occupational Therapy: Eval and treat.  Cultures: none.    Consults: Hospitalist.  Follow-up: Clinic with Dr. Ring in 6 weeks with repeat x-rays.   Disposition: Pending progress with therapies, pain control on orals, and medical stability, anticipate discharge to home on POD #2-4.    Alejo Saba MD    Thank you for allowing me to participate in this patient's care. Please page me directly any questions/concerns.   Securely message with the Vocera Web Console (learn more here)  Text page via [employer] Paging/Directory    If there is no response, if it is a weekend, or if  it is during evening hours, please page the orthopaedic surgery resident on call via [facility] Paging/Directory    Implants:   Implant Name Type Inv. Item Serial No.  Lot No. LRB No. Used Action   IMP SCR SET MEDT SOLERA BREAK OFF 5.5MM TI [id number] - [id number] Metallic Hardware/[place] IMP SCR SET MEDT SOLERA BREAK OFF 5.5MM TI [id number]  [employer]  N/A 4 Used as a Supply   IMP SCR MEDT 5.5/6.0MM SOLERA 7.5X50MM MA [id number] - [id number] Metallic Hardware/[place] IMP SCR MEDT 5.5/6.0MM SOLERA 7.5X50MM MA [id number]  [employer]  N/A 2 Implanted   IMP SCR MEDT 5.5/6.0MM SOLERA 7.5X45MM MA [id number] - [id number] Metallic Hardware/[place] IMP SCR MEDT 5.5/6.0MM SOLERA 7.5X45MM MA [id number]  [employer]  N/A 2 Implanted   peek/ti/ta spacer, 11mm x 22mm, 12deg    [employer] [id number] N/A 2 Implanted   GRAFT [facility] 15CC CRS 1-10MM [id number] - [id number] Bone/Tissue/Biologic GRAFT [facility] 15 CRS 1-10MM [id number] [employer]  N/A 1 Implanted   IMP ROXI MEDT SOLERA CVD 5.5X35MM TI [id number] - [id number] Metallic Hardware/[place] IMP ROXI MEDT SOLERA CVD 5.5X35MM TI [id number]  [employer]  N/A 2 Implanted

## 2024-08-21 NOTE — OR NURSING
"PACU to Inpatient Nursing Handoff    Patient Zaira Naranjo is a 64 year old female who speaks English.   Procedure Procedure(s):  Transforaminal Lumbar Interbody Fusion with De La Cruz-Miller Osteotomy Lumbar 5-Sacral 1; Use of Infuse Bone Morphogenetic Protein and Allograft   Surgeon(s) Primary: Jack Dumont MD  Fellow - Assisting: Alejo Saba DO     Allergies   Allergen Reactions    Cats     Other Environmental Allergy     Seasonal Allergies     Thimerosal (Thiomersal)     Hydromorphone Nausea    Ketorolac Tromethamine Nausea       Isolation  [unfilled]     Past Medical History   has a past medical history of Allergic rhinitis, Anxiety, GERD (gastroesophageal reflux disease), Gout, History of smoking, HTN (hypertension), Mixed hyperlipidemia, Obesity, PONV (postoperative nausea and vomiting), and Pulmonary sarcoidosis (H24).    Anesthesia General   Dermatome Level     Preop Meds acetaminophen (Tylenol) - time given: 0923  gabapentin (Neurontin) - time given: 0923  Emend 40mg PO @  - time given: 0923   Nerve block Not applicable   Intraop Meds dexamethasone (Decadron)  dexmedetomidine (Precedex): 20 mcg total  fentanyl (Sublimaze): 100 mcg total  hydromorphone (Dilaudid): 1 mg total  ketamine (Ketalar): 50 mg given  ondansetron (Zofran): last given at 1012   Local Meds No   Antibiotics cefazolin (Ancef) - last given at 1029     Pain Patient Currently in Pain: other (see comments) (sedated)   PACU meds  morphine (IV): 2 mg (total dose) last given at 1539   PCA / epidural No   Capnography     Telemetry ECG Rhythm: Normal sinus rhythm   Inpatient Telemetry Monitor Ordered? No        Labs Glucose Lab Results   Component Value Date     08/21/2024     10/03/2022       Hgb Lab Results   Component Value Date    HGB 13.5 08/02/2024       INR No results found for: \"INR\"   PACU Imaging Not applicable     Wound/Incision Incision/Surgical Site 02/27/24 Lower Thoracic spine (Active)   Number of " days: 176       Incision/Surgical Site 08/21/24 Posterior Back (Active)   Incision Assessment UTV 08/21/24 1454   Closure Liquid bandage;Sutures 08/21/24 1342   Incision Drainage Amount None 08/21/24 1405   Dressing Intervention Clean, dry, intact 08/21/24 1454   Number of days: 0      CMS  Pulses 2+, denies numbness/ tingling      Equipment ice pack   Other LDA       IV Access Peripheral IV 08/21/24 Right;Posterior Hand (Active)   Site Assessment [facility] 08/21/24 1454   Line Status Saline locked 08/21/24 1405   Dressing Transparent 08/21/24 0827   Dressing Status clean;dry;intact 08/21/24 1405   Dressing Intervention New dressing  08/21/24 0827   Phlebitis Scale 0-->no symptoms 08/21/24 1405   Infiltration? no 08/21/24 0827   Number of days: 0       Peripheral IV 08/21/24 Left Hand (Active)   Site Assessment [facility] 08/21/24 1454   Line Status Infusing 08/21/24 1405   Dressing Status clean;dry;intact 08/21/24 1405   Number of days: 0      Blood Products Not applicable  mL   Intake/Output Date 08/21/24 0700 - 08/22/24 0659   Shift 6779-2301 1913-0800 0359-5722 24 Hour Total   INTAKE   I.V. 1700   1700   Colloid 500   500   Shift Total(mL/kg) 2200(24.55)   2200(24.55)   OUTPUT   Urine 150   150   Blood 150   150   Shift Total(mL/kg) 300(3.35)   300(3.35)   Weight (kg) 89.6 89.6 89.6 89.6      Drains / Zuñiga Closed/Suction Drain 1 Posterior Back Accordion 10 Cameroonian (Active)   Site Description WDL 08/21/24 1405   Dressing Status Normal: Clean, Dry & Intact 08/21/24 1405   Drainage Appearance Bloody/Bright Red 08/21/24 1405   Status To bulb suction 08/21/24 1405   Number of days: 0       Urethral Catheter 08/21/24 Latex 16 fr (Active)   Tube Description Positional 08/21/24 1405   Collection Container Patent;Standard 08/21/24 1405   Securement Method Securing device (Describe) 08/21/24 1405   Rationale for Continued Use Surgical procedure 08/21/24 1904   Number of days: 0      Time of void PreOp Time of Void Prior to  Procedure: 0700 (08/21/24 0813)    PostOp      Diapered? No   Bladder Scan  rodriguez   PO    tolerating sips     Vitals    B/P: 95/55  T: 98.2  F (36.8  C)    Temp src: Axillary  P:  Pulse: 73 (08/21/24 1430)          R: 11  O2:  SpO2: 95 %    O2 Device: Oxymask (08/21/24 1430)    Oxygen Delivery: 6 LPM (08/21/24 1430)         Family/support present significant other Justice   Patient belongings     Patient transported on bed   DC meds/scripts (obs/outpt) Not applicable   Inpatient Pain Meds Released? Yes       Special needs/considerations Calm, pleasant   Tasks needing completion I M consult       Alva Yuan RN

## 2024-08-21 NOTE — PROGRESS NOTES
"Orthopaedic Surgery Progress Note:       Subjective:   Patient overall doing well. Feels nausea but no vomiting. Pain well controlled on current regimen.     Objective:   /57   Pulse 76   Temp 98.2  F (36.8  C) (Axillary)   Resp 12   Ht 1.727 m (5' 8\")   Wt 89.6 kg (197 lb 8.5 oz)   SpO2 (!) 89%   BMI 30.03 kg/m    No intake/output data recorded.  Gen: NAD. Resting comfortably in bed  Resp: Breathing comfortably on RA  : Zuñiga in place    Dressing clean and dry  Drain in place and maintaining suction        Lumbar Spine:    Appearance - No gross stepoffs or deformities    Motor -     L2-3: Hip flexion 5/5 R and 5/5 L strength          L3/4:  Knee extension R 5/5 and L 5/5 strength         L4/5:  Foot dorsiflexion R 5/5 L 5/5 and       EHL dorsiflexion R 5/5 L 5/5 strength         S1:  Plantarflexion/Peroneal Muscles  R 5/5 and L 5/5 strength    Sensation: intact to light touch L3-S1 distribution BLE        Labs:  Lab Results   Component Value Date    WBC 5.3 08/02/2024    HGB 13.5 08/02/2024     08/02/2024        Assessment & Plan:   Assessment and Plan: Zaira Naranjo is a 64 year old female s/p L5-S1 TLIF on 8/21 with Dr. Dumont.     Dr. Dumont Primary  Activity:   - Up with assist until independent. No excessive bending or twisting. No lifting >10 lbs x 6 weeks.   Weight bearing status: WBAT.  Pain management:   - Transition from IV to PO narcotics as tolerated. No NSAIDs x 3 months.   - IV lidocaine: discontinue at 8am on POD#2 or earlier if complications arise.  Antibiotics: Ancef x 24 hours.  Diet: Begin with clear fluids and progress diet as tolerated.   DVT prophylaxis: SCDs only. No chemical DVT ppx needed.  Imaging: XR Upright Lumbar PTDC - ordered.  Labs: Hgb POD#1.  Bracing/Splinting: None.  Dressings: Keep Aquacel c/d/i x 7 days.  Drains: Hemovac, Document output per shift, will be discontinued at Orthopedic Surgery discretion.  Zuñiga catheter: Remove POD#1.  Physical " Therapy/Occupational Therapy: Eval and treat.  Cultures: none.    Consults: Hospitalist.  Follow-up: Clinic with Dr. Dumont in 6 weeks with repeat x-rays.   Disposition: Pending progress with therapies, pain control on orals, and medical stability, anticipate discharge to home on POD #2-4.    Alejo Saba, DO  Spine Fellow

## 2024-08-21 NOTE — ANESTHESIA CARE TRANSFER NOTE
Patient: Zaira Naranjo    Procedure: Procedure(s):  Transforaminal Lumbar Interbody Fusion with De La Cruz-Miller Osteotomy Lumbar 5-Sacral 1; Use of Infuse Bone Morphogenetic Protein and Allograft       Diagnosis: Lumbosacral spondylosis with radiculopathy [M47.27]  Flatback syndrome of lumbosacral region [M40.37]  Diagnosis Additional Information: No value filed.    Anesthesia Type:   General     Note:    Oropharynx: oral airway in place  Level of Consciousness: drowsy  Oxygen Supplementation: face mask  Level of Supplemental Oxygen (L/min / FiO2): 6  Independent Airway: airway patency not satisfactory and stable  Dentition: dentition unchanged  Vital Signs Stable: post-procedure vital signs reviewed and stable  Report to RN Given: handoff report given  Patient transferred to: PACU  Comments: Phenylephrine gtt continued in to PACU  Handoff Report: Identifed the Patient, Identified the Reponsible Provider, Reviewed the pertinent medical history, Discussed the surgical course, Reviewed Intra-OP anesthesia mangement and issues during anesthesia, Set expectations for post-procedure period and Allowed opportunity for questions and acknowledgement of understanding  Vitals:  Vitals Value Taken Time   BP 93/50 08/21/24 1415   Temp     Pulse 72 08/21/24 1418   Resp 11 08/21/24 1418   SpO2 95 % 08/21/24 1418   Vitals shown include unfiled device data.    Electronically Signed By: TESFAYE Ojeda CRNA  August 21, 2024  2:18 PM

## 2024-08-21 NOTE — ANESTHESIA PREPROCEDURE EVALUATION
Anesthesia Pre-Procedure Evaluation    Patient: Zaira Naranjo   MRN: [id number] : 1960        Procedure : Procedure(s):  Transforaminal Lumbar Interbody Fusion with De La Cruz-Miller Osteotomy Lumbar 5-Sacral 1; Use of Infuse Bone Morphogenetic Protein and Allograft          Past Medical History:   Diagnosis Date    Allergic rhinitis     Anxiety     GERD (gastroesophageal reflux disease)     Gout     History of smoking     HTN (hypertension)     Mixed hyperlipidemia     Obesity     PONV (postoperative nausea and vomiting)     Pulmonary sarcoidosis (H24)       Past Surgical History:   Procedure Laterality Date    EXCIS BARTHOLIN GLAND/CYST      fallopian tube reconstruction      x2    LAPAROSCOPY      SPINE SURGERY  2000    lumbar discectomy    SPINE SURGERY  2006    cervical fusion      Allergies   Allergen Reactions    Cats     Other Environmental Allergy     Seasonal Allergies     Thimerosal (Thiomersal)     Hydromorphone Nausea    Ketorolac Tromethamine Nausea      Social History     Tobacco Use    Smoking status: Former     Current packs/day: 0.00     Types: Cigarettes     Quit date: 1995     Years since quittin.6     Passive exposure: Never    Smokeless tobacco: Never    Tobacco comments:     Quit     Substance Use Topics    Alcohol use: Yes     Comment: 2 beers a week      Wt Readings from Last 1 Encounters:   24 89.6 kg (197 lb 8.5 oz)        Anesthesia Evaluation   Pt has had prior anesthetic. Type: General and MAC.    History of anesthetic complications  - PONV.      ROS/MED HX  ENT/Pulmonary: Comment: Non-active pulmonary sarcoidosis  -- Hx/o Mediastinal lymphadenopathy due to sarcoidosis, stable  Past smoking history. Quit in the 90s    (+)     DALE risk factors,  hypertension,    allergic rhinitis,     tobacco use, Past use,                    (-) recent URI   Neurologic:  - neg neurologic ROS     Cardiovascular: Comment: Takes lisinopril, statin     (+) Dyslipidemia  hypertension- -   -  - -                                 Previous cardiac testing   Echo: Date: 2020 Results:  Interpretation Summary   Qualitative ejection fraction is 65-70% (normal).   Normal right ventricular systolic function.   Mild aortic insufficiency.   Mild tricuspid regurgitation.   Doppler findings do not suggest pulmonary hypertension.   AI less than last echo.     Stress Test:  Date: 11/2023 Results:     The nuclear stress test is negative for inducible myocardial ischemia  or infarction.      Left ventricular function is normal.      The left ventricular ejection fraction at rest is 69%.  The left ventricular ejection fraction at stress is 75%.      No prior exam is available for comparison.   ECG Reviewed:  Date: Results:    Cath:  Date: 2022 Results:  CT coronary  IMPRESSION: CT calcium score of 170 placing the patient at moderate  risk of significant coronary artery disease.   (-) SHANKAR and orthopnea/PND   METS/Exercise Tolerance: >4 METS Comment: Does Aqua[employer] twice per week for exercise.  Denies any exertional dyspnea or angina.   Hematologic:  - neg hematologic  ROS     Musculoskeletal: Comment: Chronic low back pain with LLE radicular pain - s/p 1 injection and physical therapy   Mid back pain due to bone spurs in the thoracic spine  S/p cervical spine fusion 2006  Gout      GI/Hepatic:     (+) GERD, Asymptomatic on medication,           liver disease (fatty liver),       Renal/Genitourinary:  - neg Renal ROS     Endo:     (+)               Obesity,       Psychiatric/Substance Use:     (+) psychiatric history anxiety   Recreational drug usage: Cannabis.    Infectious Disease:  - neg infectious disease ROS     Malignancy:  - neg malignancy ROS     Other:  - neg other ROS    (+)  , H/O Chronic Pain,         Physical Exam    Airway  airway exam normal    Comment: Full neck ROM, but reports pain to posterior neck with hyperextension. Denies radicular symptoms with extension or flexion of the neck.  "Reports noticing numbness/tingling to hands when she wakes up in middle of the night from a bad neck position.     Mallampati: II   TM distance: > 3 FB   Neck ROM: full   Mouth opening: > 3 cm    Respiratory Devices and Support         Dental     Comment: Has implants in place, but Not removable dentures.     (+) Minor Abnormalities - some fillings, tiny chips      Cardiovascular             Pulmonary   pulmonary exam normal                OUTSIDE LABS:  CBC:   Lab Results   Component Value Date    WBC 5.3 08/02/2024    WBC 6.9 10/03/2022    HGB 13.5 08/02/2024    HGB 14.0 10/03/2022    HCT 40.0 08/02/2024    HCT 41.1 10/03/2022     08/02/2024     10/03/2022     BMP:   Lab Results   Component Value Date     08/02/2024     (L) 08/28/2023    POTASSIUM 4.7 08/02/2024    POTASSIUM 4.1 08/28/2023    CHLORIDE 98 08/02/2024    CHLORIDE 100 08/28/2023    CO2 28 08/02/2024    CO2 25 08/28/2023    BUN 12.7 08/02/2024    BUN 14.9 08/28/2023    CR 0.97 (H) 08/02/2024    CR 0.88 08/28/2023     (H) 08/21/2024     (H) 08/02/2024     COAGS: No results found for: \"PTT\", \"INR\", \"FIBR\"  POC: No results found for: \"BGM\", \"HCG\", \"HCGS\"  HEPATIC:   Lab Results   Component Value Date    ALBUMIN 4.3 08/02/2024    PROTTOTAL 7.3 08/02/2024    ALT 25 08/02/2024    AST 31 08/02/2024    ALKPHOS 79 08/02/2024    BILITOTAL 0.5 08/02/2024     OTHER:   Lab Results   Component Value Date    A1C 5.4 08/28/2023    ADILENE 9.6 08/02/2024    TSH 1.93 08/28/2023       Anesthesia Plan    ASA Status:  3    NPO Status:  NPO Appropriate    Anesthesia Type: General.     - Airway: ETT   Induction: Intravenous.   Maintenance: Balanced.   Techniques and Equipment:     - Lines/Monitors: 2nd IV     Consents    Anesthesia Plan(s) and associated risks, benefits, and realistic alternatives discussed. Questions answered and patient/representative(s) expressed understanding.     - Discussed:     - Discussed with:  Patient      - " "Extended Intubation/Ventilatory Support Discussed: No.      - Patient is DNR/DNI Status: No     Use of blood products discussed: Yes.     - Discussed with: Patient.     Postoperative Care    Pain management: IV analgesics, Oral pain medications, Multi-modal analgesia.   PONV prophylaxis: Ondansetron (or other 5HT-3), Dexamethasone or Solumedrol, Aprepitant     Comments:               Ivette Goldstein MD    I have reviewed the pertinent notes and labs in the chart from the past 30 days and (re)examined the patient.  Any updates or changes from those notes are reflected in this note.     # Hyponatremia: Lowest Na = 135 mmol/L in last 30 days, will monitor as appropriate          # Obesity: Estimated body mass index is 30.03 kg/m  as calculated from the following:    Height as of this encounter: 1.727 m (5' 8\").    Weight as of this encounter: 89.6 kg (197 lb 8.5 oz).      "

## 2024-08-21 NOTE — CONSULTS
"[facility]  Consult Note - Hospitalist Service, GOLD TEAM   Date of Admission:  8/21/2024  Consult Requested by: Jack Dumont MD  Reason for Consult: Medical co-management following transforaminal lumbar interbody fusion L5-S1    Assessment & Plan   Zaira Naranjo is a 64 year old female with PMHx of HTN, HLD, pulmonary sarcoidosis, obesity, hepatic steatosis, CKD stage II, anxiety, gout, GERD and chronic low back pain with lumbosacral spondylosis with radiculopathy and flatback syndrome admitted on 8/21/2024, following an elective transforaminal lumbar interbody fusion L5-S1. Medicine consulted for medical co-management.     # Post operative care following transforaminal lumbar interbody fusion L5-S1  Patient underwent the above procedure by Dr. Jack Dumont under [place].  cc. Received perioperative ancef. No reported complications. Hemovac drain in place.   - Pain management per primary team: APAP 975 mg TID, oxycodone 5-10 mg Q4H PRN, IV morphine 1-2 mg Q2H PRN, Robaxin 750 mg QID PRN, flexeril 10 mg Q8H, lidocaine gtt   - Hemovac drain management per primary team   - DVT ppx per primary team: SCDs  - No NSAIDs x3 months.   - Zuñiga catheter in place, to be removed on POD 1 per primary team   - WBAT. Up with assist until independent. No excessive bending or twisting. No lifting >10 lbs x 6 weeks   - PT/OT  - Bowel regimen  - POD 1 BMP and Hgb     # HTN:   - Continue home amlodipine 10 mg daily and metoprolol  mg daily with holding parameters.   - Hold home lisinopril 40 mg daily and Chlorthalidone 12.5 mg daily and resume once BP and creatinine tolerates    # HLD: Continue home atorvastatin 20 mg daily      # Pulmonary sarcoidosis: patient reports having \"inactive\" pulmonary sarcoidosis. Denies any respiratory symptoms. No longer following with pulmonology. See last chest CT from January 2024 in imaging.   - continue home albuterol PRN     # " "Anxiety, insomnia: Continue home atarax 25 mg TID PRN and amitriptyline 25 mg at bedtime. Patient reports using cannabis for sleep.     # Gout: Continue home allopurinol 200 mg daily     # CKD stage II: baseline Creatinine 0.8-0.9 with eGFR 65-70mL/min. Check BMP on POD1. Renally dose medications. Avoid nephrotoxic agents.     # GERD: continue home PPI     # Weight loss: Reports unintentional weight loss of approximately 10 pounds. UTD with cancer screenings per patient. Recommend follow up with PCP.        The patient's care was discussed with the Bedside Nurse, Patient, and recommendations relay via note to primary Team. Medicine will continue to follow.     Clinically Significant Risk Factors Present on Admission                  # Hypertension: Noted on problem list         # Obesity: Estimated body mass index is 30.03 kg/m  as calculated from the following:    Height as of this encounter: 1.727 m (5' 8\").    Weight as of this encounter: 89.6 kg (197 lb 8.5 oz).              Consuelo Coelho PA-C  Hospitalist Service, GOLD TEAM   Securely message with [employer] (more info)  Text page via [facility] Paging/Directory   See signed in provider for up to date coverage information  ______________________________________________________________________    Chief Complaint   Back pain     History is obtained from the patient and chart review     History of Present Illness   Zaira Naranjo is a 64 year old female with PMHx of HTN, HLD, pulmonary sarcoidosis, obesity, hepatic steatosis, anxiety, gout, GERD and chronic low back pain with lumbosacral spondylosis with radiculopathy and flatback syndrome admitted on 8/21/2024, following an elective transforaminal lumbar interbody fusion L5-S1. Medicine consulted for medical co-management.     Patient reports feeling overall well after surgery, though reporting mild to moderate pain in her low back. Denies any numbness, tingling, or weakness in her lower extremities. Does not " tolerate pain medications very well due to adverse side effects of nausea. States she gets post operative N/V, and currently has minimal nausea or vomiting due to getting good medications to control this during surgery/PACU. Patient currently denies any F/C, CP, SOB, SHANKAR, exertional CP, vomiting, abdominal pain, diarrhea, black or bloody stools, or urinary symptoms. Reports recently having >10 pounds of unintentional weight loss. Reports poor appetite with aversion to food. Denies any early satiety or pain with eating. States she is UTD with cancer screenings including colo guard, mammograms and pap smears. Denies any recent alcohol use. Former smoker, but quit 30 years ago.    Patient report chronic neck pain from her prior cervical fusion, takes ibuprofen daily. Hx of pulmonary sarcoidosis which is not active. Noted to have a moderate score on her CT calcium score, but recent stress test was normal.       Past Medical History    Past Medical History:   Diagnosis Date    Allergic rhinitis     Anxiety     GERD (gastroesophageal reflux disease)     Gout     History of smoking     HTN (hypertension)     Mixed hyperlipidemia     Obesity     PONV (postoperative nausea and vomiting)     Pulmonary sarcoidosis (H24)        Past Surgical History   Past Surgical History:   Procedure Laterality Date    EXCIS BARTHOLIN GLAND/CYST      fallopian tube reconstruction      x2    LAPAROSCOPY      SPINE SURGERY  2000    lumbar discectomy    SPINE SURGERY  2006    cervical fusion       Medications   Medications Prior to Admission   Medication Sig Dispense Refill Last Dose    albuterol (PROAIR HFA/PROVENTIL HFA/VENTOLIN HFA) 108 (90 Base) MCG/ACT inhaler Inhale 2 puffs into the lungs every 6 hours as needed for shortness of breath, wheezing or cough 18 g 0 More than a month    allopurinol (ZYLOPRIM) 100 MG tablet Take 2 tablets (200 mg) by mouth daily 180 tablet 3 8/20/2024 at 2230    amitriptyline (ELAVIL) 25 MG tablet Take 1 tablet  (25 mg) by mouth at bedtime 90 tablet 2 8/20/2024 at 2230    amLODIPine (NORVASC) 10 MG tablet Take 1 tablet (10 mg) by mouth daily 90 tablet 3 8/21/2024 at 0500    atorvastatin (LIPITOR) 20 MG tablet TAKE 1 TABLET BY MOUTH DAILY AT BEDTIME 90 tablet 1 8/20/2024 at 2230    cetirizine HCl (ZYRTEC ALLERGY) 10 MG CAPS Take 1 tablet by mouth daily   8/20/2024 at 0700    chlorthalidone (HYGROTON) 25 MG tablet 1/2 tablet daily (Patient taking differently: Take 12.5 mg by mouth every morning. 1/2 tablet daily) 45 tablet 3 8/20/2024 at 0700    cyclobenzaprine (FLEXERIL) 10 MG tablet Take 1 tablet (10 mg) by mouth 2 times daily as needed for muscle spasms 15 tablet 0 Unknown    fluticasone (FLONASE) 50 MCG/ACT nasal spray Spray 2 sprays in nostril daily 18.2 mL 11 8/20/2024 at 0700    hydrOXYzine HCl (ATARAX) 25 MG tablet TAKE 1 TABLET BY MOUTH 3 TIMES DAILY AS NEEDED FOR ANXIETY 30 tablet 4 Past Month    ibuprofen (ADVIL/MOTRIN) 200 MG tablet Take 200 mg by mouth every 4 hours as needed for pain   8/20/2024 at 0700    lisinopril (ZESTRIL) 40 MG tablet Take 1 tablet (40 mg) by mouth daily 90 tablet 3 8/20/2024 at 0700    metoprolol succinate ER (TOPROL XL) 100 MG 24 hr tablet TAKE 1 TABLET BY MOUTH DAILY. DO NOT CRUSH OR CHEW (Patient taking differently: 100 mg every morning. TAKE 1 TABLET BY MOUTH DAILY. DO NOT CRUSH OR CHEW) 90 tablet 3 8/21/2024 at 0500    olopatadine (PATANOL) 0.1 % ophthalmic solution Place 1 drop into both eyes 2 times daily 5 mL 1 Past Week    omeprazole (PRILOSEC) 20 MG DR capsule TAKE 1 CAPSULE BY MOUTH DAILY BEFORE A MEAL. DO NOT CRUSH. (Patient taking differently: 20 mg every morning. TAKE 1 CAPSULE BY MOUTH DAILY BEFORE A MEAL. DO NOT CRUSH.) 90 capsule 3 8/21/2024 at 0500             Physical Exam   Vital Signs: Temp: 97.5  F (36.4  C) Temp src: Oral BP: 109/71 Pulse: 81   Resp: 16 SpO2: 99 % O2 Device: Nasal cannula Oxygen Delivery: 3 LPM  Weight: 197 lbs 8.51 oz    GENERAL: Alert and awake.  Answering questions appropriately. NAD. Pleasant and conversational   HEENT: Anicteric sclera. Mucous membranes moist   CARDIOVASCULAR: RRR. S1, S2. No murmurs, rubs, or gallops.   RESPIRATORY: Effort normal on 3L NC. Clear to auscultation bilaterally, no rales, rhonchi or wheezes  GI: Abdomen soft, non-tender abdomen without rebound or guarding, normoactive bowel sounds present  MUSCULOSKELETAL: Dressing to low back C/D/I. Hemovac drain draining serosanguinous fluid. Strength 5/5 with plantar and dorsiflex of LE bilaterally. Sensation intact to light tough in LE.   EXTREMITIES: No peripheral edema.   NEUROLOGICAL:  CN II-XII grossly intact. Moving all extremities symmetrically.   SKIN: Intact. Warm and dry. No jaundice.    Medical Decision Making       60 MINUTES SPENT BY ME on the date of service doing chart review, history, exam, documentation & further activities per the note.      Data   ------------------------- PAST 24 HR DATA REVIEWED -----------------------------------------------        Imaging results reviewed over the past 24 hrs:   Recent Results (from the past 24 hour(s))   XR Surgery BARTOLOME L/T 5 Min Fluoro    Narrative    This exam was marked as non-reportable because it will not be read by a   radiologist or a [facility] non-radiologist provider.

## 2024-08-22 ENCOUNTER — APPOINTMENT (OUTPATIENT)
Dept: PHYSICAL THERAPY | Facility: CLINIC | Age: 64
DRG: 455 | End: 2024-08-22
Attending: PHYSICIAN ASSISTANT
Payer: COMMERCIAL

## 2024-08-22 ENCOUNTER — APPOINTMENT (OUTPATIENT)
Dept: OCCUPATIONAL THERAPY | Facility: CLINIC | Age: 64
DRG: 455 | End: 2024-08-22
Attending: ORTHOPAEDIC SURGERY
Payer: COMMERCIAL

## 2024-08-22 LAB
ANION GAP SERPL CALCULATED.3IONS-SCNC: 8 MMOL/L (ref 7–15)
BUN SERPL-MCNC: 11.4 MG/DL (ref 8–23)
CALCIUM SERPL-MCNC: 9.4 MG/DL (ref 8.8–10.4)
CHLORIDE SERPL-SCNC: 101 MMOL/L (ref 98–107)
CREAT SERPL-MCNC: 0.95 MG/DL (ref 0.51–0.95)
EGFRCR SERPLBLD CKD-EPI 2021: 67 ML/MIN/1.73M2
GLUCOSE SERPL-MCNC: 161 MG/DL (ref 70–99)
HCO3 SERPL-SCNC: 28 MMOL/L (ref 22–29)
HGB BLD-MCNC: 12.1 G/DL (ref 11.7–15.7)
POTASSIUM SERPL-SCNC: 4.6 MMOL/L (ref 3.4–5.3)
SODIUM SERPL-SCNC: 137 MMOL/L (ref 135–145)

## 2024-08-22 PROCEDURE — 36415 COLL VENOUS BLD VENIPUNCTURE: CPT | Performed by: PHYSICIAN ASSISTANT

## 2024-08-22 PROCEDURE — 97530 THERAPEUTIC ACTIVITIES: CPT | Mod: GO

## 2024-08-22 PROCEDURE — 250N000011 HC RX IP 250 OP 636: Mod: JZ | Performed by: PHYSICIAN ASSISTANT

## 2024-08-22 PROCEDURE — 99232 SBSQ HOSP IP/OBS MODERATE 35: CPT | Performed by: INTERNAL MEDICINE

## 2024-08-22 PROCEDURE — 97161 PT EVAL LOW COMPLEX 20 MIN: CPT | Mod: GP

## 2024-08-22 PROCEDURE — 85018 HEMOGLOBIN: CPT | Performed by: PHYSICIAN ASSISTANT

## 2024-08-22 PROCEDURE — 250N000013 HC RX MED GY IP 250 OP 250 PS 637: Performed by: PHYSICIAN ASSISTANT

## 2024-08-22 PROCEDURE — 80048 BASIC METABOLIC PNL TOTAL CA: CPT | Performed by: PHYSICIAN ASSISTANT

## 2024-08-22 PROCEDURE — 250N000013 HC RX MED GY IP 250 OP 250 PS 637: Performed by: NURSE PRACTITIONER

## 2024-08-22 PROCEDURE — 97110 THERAPEUTIC EXERCISES: CPT | Mod: GP

## 2024-08-22 PROCEDURE — 250N000011 HC RX IP 250 OP 636

## 2024-08-22 PROCEDURE — 97535 SELF CARE MNGMENT TRAINING: CPT | Mod: GO

## 2024-08-22 PROCEDURE — 97165 OT EVAL LOW COMPLEX 30 MIN: CPT | Mod: GO

## 2024-08-22 PROCEDURE — 120N000002 HC R&B MED SURG/OB UMMC

## 2024-08-22 RX ORDER — MORPHINE SULFATE 15 MG/1
15 TABLET ORAL EVERY 4 HOURS PRN
Status: DISCONTINUED | OUTPATIENT
Start: 2024-08-22 | End: 2024-08-23 | Stop reason: HOSPADM

## 2024-08-22 RX ORDER — MORPHINE SULFATE 15 MG/1
7.5 TABLET ORAL EVERY 4 HOURS PRN
Status: DISCONTINUED | OUTPATIENT
Start: 2024-08-22 | End: 2024-08-23 | Stop reason: HOSPADM

## 2024-08-22 RX ADMIN — Medication 15 MG: at 21:45

## 2024-08-22 RX ADMIN — ACETAMINOPHEN 975 MG: 325 TABLET ORAL at 10:12

## 2024-08-22 RX ADMIN — ACETAMINOPHEN 975 MG: 325 TABLET ORAL at 17:04

## 2024-08-22 RX ADMIN — Medication 15 MG: at 12:26

## 2024-08-22 RX ADMIN — MORPHINE SULFATE 2 MG: 2 INJECTION, SOLUTION INTRAMUSCULAR; INTRAVENOUS at 02:16

## 2024-08-22 RX ADMIN — POLYETHYLENE GLYCOL 3350 17 G: 17 POWDER, FOR SOLUTION ORAL at 08:43

## 2024-08-22 RX ADMIN — METHOCARBAMOL 750 MG: 750 TABLET ORAL at 19:44

## 2024-08-22 RX ADMIN — PANTOPRAZOLE SODIUM 40 MG: 40 TABLET, DELAYED RELEASE ORAL at 08:44

## 2024-08-22 RX ADMIN — ALLOPURINOL 200 MG: 100 TABLET ORAL at 08:41

## 2024-08-22 RX ADMIN — CEFAZOLIN SODIUM 2 G: 2 INJECTION, SOLUTION INTRAVENOUS at 10:52

## 2024-08-22 RX ADMIN — AMITRIPTYLINE HYDROCHLORIDE 25 MG: 25 TABLET, FILM COATED ORAL at 21:45

## 2024-08-22 RX ADMIN — METHOCARBAMOL 750 MG: 750 TABLET ORAL at 10:52

## 2024-08-22 RX ADMIN — Medication 15 MG: at 17:04

## 2024-08-22 RX ADMIN — MORPHINE SULFATE 2 MG: 2 INJECTION, SOLUTION INTRAMUSCULAR; INTRAVENOUS at 08:35

## 2024-08-22 RX ADMIN — SENNOSIDES AND DOCUSATE SODIUM 1 TABLET: 50; 8.6 TABLET ORAL at 19:44

## 2024-08-22 RX ADMIN — ACETAMINOPHEN 975 MG: 325 TABLET ORAL at 02:14

## 2024-08-22 RX ADMIN — AMLODIPINE BESYLATE 10 MG: 5 TABLET ORAL at 08:43

## 2024-08-22 RX ADMIN — FAMOTIDINE 20 MG: 20 TABLET ORAL at 10:12

## 2024-08-22 RX ADMIN — CEFAZOLIN SODIUM 2 G: 2 INJECTION, SOLUTION INTRAVENOUS at 02:16

## 2024-08-22 RX ADMIN — MORPHINE SULFATE 2 MG: 2 INJECTION, SOLUTION INTRAMUSCULAR; INTRAVENOUS at 06:12

## 2024-08-22 RX ADMIN — ATORVASTATIN CALCIUM 20 MG: 20 TABLET, FILM COATED ORAL at 21:45

## 2024-08-22 RX ADMIN — SENNOSIDES AND DOCUSATE SODIUM 1 TABLET: 50; 8.6 TABLET ORAL at 08:42

## 2024-08-22 RX ADMIN — METOPROLOL SUCCINATE 100 MG: 50 TABLET, EXTENDED RELEASE ORAL at 08:42

## 2024-08-22 RX ADMIN — FAMOTIDINE 20 MG: 20 TABLET ORAL at 19:44

## 2024-08-22 ASSESSMENT — ACTIVITIES OF DAILY LIVING (ADL)
ADLS_ACUITY_SCORE: 31
ADLS_ACUITY_SCORE: 30
ADLS_ACUITY_SCORE: 26
ADLS_ACUITY_SCORE: 30
ADLS_ACUITY_SCORE: 26
ADLS_ACUITY_SCORE: 30
ADLS_ACUITY_SCORE: 26
ADLS_ACUITY_SCORE: 28
ADLS_ACUITY_SCORE: 26
ADLS_ACUITY_SCORE: 26
ADLS_ACUITY_SCORE: 31
ADLS_ACUITY_SCORE: 30
ADLS_ACUITY_SCORE: 31
ADLS_ACUITY_SCORE: 30
ADLS_ACUITY_SCORE: 26
ADLS_ACUITY_SCORE: 26
ADLS_ACUITY_SCORE: 28
ADLS_ACUITY_SCORE: 26
ADLS_ACUITY_SCORE: 31

## 2024-08-22 NOTE — PLAN OF CARE
Goal Outcome Evaluation:      Plan of Care Reviewed With: patient    Overall Patient Progress: decliningOverall Patient Progress: declining          Admission Note    Reason for admission: spinal surgery  Primary team notified of pt arrival.  Admitted from: PACU  Via: bed  Accompanied by:  and daughter  Belongings: Placed in closet; valuables sent home with family  Admission Required Doc Completed: Yes- in progress    Teaching: Orientation to unit and call light- call light within reach, use of console, meal times, when to call for the RN, and enforced importance of safety.  IV Access: Left and Right PIV  Telemetry: No  Ht./Wt.: Completed  Code Status verified on armband: Yes  2 RN Skin Assessment Completed with: Brunilda RN  Suction/Ambu bag/Flowmeter at bedside: Yes    Pt status:     Temp:  [97.4  F (36.3  C)-98.4  F (36.9  C)] 98.4  F (36.9  C)  Pulse:  [71-81] 81  Resp:  [11-18] 16  BP: ()/(50-80) 126/80  SpO2:  [87 %-99 %] 96 %       Pt arrived with no nausea, wanted to eat, ordered dinner, became nauseated about 70 min after taking PO oxycodone- was told in report that pt tolerates morphine but can't use dilaudid due to nausea- pt does not use opioids at home, was using ibuprofen and cannabis for pain. Pt given zofran and compazine, then IV morphine. Hemovac in place, SCDs on, tried to wean off 02 unable when sleeping. Able to make needs known, denied numbness or tingling, lidocaine infusing. Of note patient reports she has chronic difficulty with swallowing pills, no issues with liquids or other solids. Continue POC

## 2024-08-22 NOTE — PLAN OF CARE
Goal Outcome Evaluation:      Plan of Care Reviewed With: patient    Overall Patient Progress: improvingOverall Patient Progress: improving    Outcome Evaluation: Pt alert and oriented x4, pain better managed, using IS with encouragement, on RA, repoprted new 2nd toe numbness, seen by ortho, no concerns, reg diet, tolerating well. Zuñiga out voiding fine, passing lot of gas. Able to make needs known. Needs xray. Continue POC

## 2024-08-22 NOTE — PROGRESS NOTES
08/22/24 0832   Appointment Info   Signing Clinician's Name / Credentials (OT) Aline Edwards OTR/L   Rehab Comments (OT) spinal prec   Living Environment   People in Home spouse;child(sara), adult   Current Living Arrangements house   Home Accessibility stairs to enter home   Number of Stairs, Main Entrance 3   Transportation Anticipated family or friend will provide   Living Environment Comments Pt reports living in house with  and adult daughter. Reports daughter will be able to assist at discharge.   Self-Care   Usual Activity Tolerance good   Current Activity Tolerance fair   Regular Exercise Yes   Activity/Exercise Type   (water aerobics)   Exercise Amount/Frequency 2 times/wk   Equipment Currently Used at Home none   Fall history within last six months no   Activity/Exercise/Self-Care Comment Pt reports typically IND with ADLs at [facility].   Instrumental Activities of Daily Living (IADL)   Previous Responsibilities meal prep;housekeeping;laundry;finances;driving;work;medication management;shopping   IADL Comments Pt reports typically IND with IADLs. Reports daughter can asssit with heavy IADLs.   General Information   Onset of Illness/Injury or Date of Surgery 08/21/24   Referring Physician Bishop PARISH Spence, PA-C   Patient/Family Therapy Goal Statement (OT) to go home   Additional Occupational Profile Info/Pertinent History of Current Problem Per chart:  64 year old female s/p L5-S1 TLIF   Existing Precautions/Restrictions spinal;fall   Left Upper Extremity (Weight-bearing Status) other (see comments)  (<10#s)   Right Upper Extremity (Weight-bearing Status) other (see comments)  (<10#s)   Left Lower Extremity (Weight-bearing Status) full weight-bearing (FWB)   Right Lower Extremity (Weight-bearing Status) full weight-bearing (FWB)   General Observations and Info Activity Ambulate   Cognitive Status Examination   Orientation Status orientation to person, place and time   Cognitive Status Comments Pt  appears grossly cognitively intact throughout obs/interview.   Visual Perception   Visual Impairment/Limitations WFL   Sensory   Sensory Comments reports changes in sensation in bottom of R foot, NP aware and ok's OOB activity   Pain Assessment   Patient Currently in Pain Yes, see Vital Sign flowsheet   Posture   Posture not impaired   Range of Motion Comprehensive   General Range of Motion bilateral upper extremity ROM WFL   Strength Comprehensive (MMT)   Comment, General Manual Muscle Testing (MMT) Assessment Not formally assessed 2/2 spinal prec, observed WFL for ADLs   Muscle Tone Assessment   Muscle Tone Quick Adds No deficits were identified   Coordination   Upper Extremity Coordination No deficits were identified   Bed Mobility   Comment (Bed Mobility) Ax1   Transfers   Transfer Comments CGA   Balance   Balance Comments Ax1   Activities of Daily Living   BADL Assessment/Intervention bathing;lower body dressing;toileting;grooming   Bathing Assessment/Intervention   [place] Level (Bathing) minimum assist (75% patient effort)   Comment, (Bathing) Per clinical reasoning   Lower Body Dressing Assessment/Training   Comment, (Lower Body Dressing) Per clinical reasoning   [place] Level (Lower Body Dressing) maximum assist (25% patient effort)   Grooming Assessment/Training   [place] Level (Grooming) supervision   Comment, (Grooming) Per clinical reasoning   Toileting   Comment, (Toileting) Per clinical reasoning   [place] Level (Toileting) maximum assist (25% patient effort)   Clinical Impression   Criteria for Skilled Therapeutic Interventions Met (OT) Yes, treatment indicated   OT Diagnosis Decreased safety/engagement in ADLs/IADLs now with post surgical prec   OT Problem List-Impairments impacting ADL problems related to;activity tolerance impaired;pain;strength;post-surgical precautions;mobility   Assessment of Occupational Performance 3-5 Performance Deficits   Identified Performance Deficits  dressing, bathing, toileting, home mgmt   Planned Therapy Interventions (OT) ADL retraining;IADL retraining;transfer training;home program guidelines;progressive activity/exercise;risk factor education   Clinical Decision Making Complexity (OT) problem focused assessment/low complexity   Risk & Benefits of therapy have been explained evaluation/treatment results reviewed;care plan/treatment goals reviewed;risks/benefits reviewed;current/potential barriers reviewed;participants voiced agreement with care plan;participants included;patient   Clinical Impression Comments Pt will benefit from skilled OT services to progress IND and safety within prec for ADLs/IADLs.   OT Total Evaluation Time   OT Eval, Low Complexity Minutes (09333) 7   OT Goals   Therapy Frequency (OT) 6 times/week   OT Predicted Duration/Target Date for Goal Attainment 08/29/24   OT Goals Toilet Transfer/Toileting;Hygiene/Grooming;Lower Body Dressing;Lower Body Bathing;OT Goal 1   OT: Hygiene/Grooming independent;modified independent   OT: Lower Body Dressing Independent;Modified independent   OT: Lower Body Bathing Independent;Modified independent   OT: Toilet Transfer/Toileting Independent;Modified independent   OT: Goal 1 Pt will verbalize 100% of precautions to progress IND and safety with ADLs/IADLs.   OT Discharge Planning   OT Plan LB dressing via figue 4, progress toilet tx, shower tx, ADLs standing at sink   OT Discharge Recommendation (DC Rec) home with assist   OT Rationale for DC Rec Pt below baseline, primarily limited by post surgical precautions. Will have good assist at home.   OT Brief overview of current status CGA with IV pole   Total Session Time   Total Session Time (sum of timed and untimed services) 7

## 2024-08-22 NOTE — PROGRESS NOTES
"Orthopaedic Surgery Progress Note:       Subjective:   Patient overall doing well. Feels some nausea but no vomiting. Pain well controlled on current regimen. Hasn't been up with therapy yet. No new radicular pain      Objective:   /68 (BP Location: Right arm)   Pulse 81   Temp 99.2  F (37.3  C) (Oral)   Resp 16   Ht 1.727 m (5' 8\")   Wt 89.6 kg (197 lb 8.5 oz)   SpO2 94%   BMI 30.03 kg/m    I/O this shift:  In: -   Out: 385 [Urine:300; Drains:85]  Gen: NAD. Resting comfortably in bed  Resp: Breathing comfortably on RA  : Rodriguez in place    Dressing clean and dry  Drain in place and maintaining suction        Lumbar Spine:    Appearance - No gross stepoffs or deformities    Motor -     L2-3: Hip flexion 5/5 R and 5/5 L strength          L3/4:  Knee extension R 5/5 and L 5/5 strength         L4/5:  Foot dorsiflexion R 5/5 L 5/5 and       EHL dorsiflexion R 5/5 L 5/5 strength         S1:  Plantarflexion/Peroneal Muscles  R 5/5 and L 5/5 strength    Sensation: intact to light touch L3-S1 distribution BLE        Labs:  Lab Results   Component Value Date    WBC 5.3 08/02/2024    HGB 13.5 08/02/2024     08/02/2024        Assessment & Plan:   Assessment and Plan: Zaira Naranjo is a 64 year old female s/p L5-S1 TLIF on 8/21 with Dr. Dumont.     Today  --remove rodriguez  --progress with therapy  --monitor drain output    Dr. Dumont Primary  Activity:   - Up with assist until independent. No excessive bending or twisting. No lifting >10 lbs x 6 weeks.   Weight bearing status: WBAT.  Pain management:   - Transition from IV to PO narcotics as tolerated. No NSAIDs x 3 months.   - IV lidocaine: discontinue at 8am on POD#2 or earlier if complications arise.  Antibiotics: Ancef x 24 hours.  Diet: Begin with clear fluids and progress diet as tolerated.   DVT prophylaxis: SCDs only. No chemical DVT ppx needed.  Imaging: XR Upright Lumbar PTDC - ordered.  Labs: Hgb POD#1.  Bracing/Splinting: None.  Dressings: " Keep Aquacel c/d/i x 7 days.  Drains: Hemovac, Document output per shift, will be discontinued at Orthopedic Surgery discretion.  Zuñiga catheter: Remove POD#1.  Physical Therapy/Occupational Therapy: Eval and treat.  Cultures: none.    Consults: Hospitalist.  Follow-up: Clinic with Dr. Dumont in 6 weeks with repeat x-rays.   Disposition: Pending progress with therapies, pain control on orals, and medical stability, anticipate discharge to home on POD #2-4.    Alejo Saba, DO  Spine Fellow

## 2024-08-22 NOTE — PROGRESS NOTES
"        [facility] - Internal Medicine Daily Note   Date of Service: 8/22/2024  Patient: Zaira Naranjo  MRN: [id number]  Admission Date: 8/21/2024  Hospital Day # 1    Assessment & Plan    Zaira Naranjo is a 64 year old female with PMHx of HTN, HLD, pulmonary sarcoidosis, obesity, hepatic steatosis, CKD stage II, anxiety, gout, GERD and chronic low back pain with lumbosacral spondylosis with radiculopathy and flatback syndrome admitted on 8/21/2024, following an elective transforaminal lumbar interbody fusion L5-S1. Medicine consulted for medical co-management.      # Post operative care following transforaminal lumbar interbody fusion L5-S1  Patient underwent the above procedure by Dr. Jack Dumont under [place].  cc. Received perioperative ancef. No reported complications. Hemovac drain in place.   - Pain management per primary team: APAP 975 mg TID, oxycodone 5-10 mg Q4H PRN, IV morphine 1-2 mg Q2H PRN, Robaxin 750 mg QID PRN, flexeril 10 mg Q8H, lidocaine gtt   - Hemovac drain management per primary team   - DVT ppx per primary team: SCDs  - No NSAIDs x3 months.   - Zuñiga catheter in place, to be removed on POD 1 per primary team   - WBAT. Up with assist until independent. No excessive bending or twisting. No lifting >10 lbs x 6 weeks   - PT/OT  - Bowel regimen  - POD 1 BMP and Hgb      # HTN:   - Continue home amlodipine 10 mg daily and metoprolol  mg daily with holding parameters.   - Hold home lisinopril 40 mg daily and Chlorthalidone 12.5 mg daily and resume once BP and creatinine tolerates     # HLD: Continue home atorvastatin 20 mg daily       # Pulmonary sarcoidosis: patient reports having \"inactive\" pulmonary sarcoidosis. Denies any respiratory symptoms. No longer following with pulmonology. See last chest CT from January 2024 in imaging.   - continue home albuterol PRN      # Anxiety, insomnia: Continue home atarax 25 mg TID PRN and amitriptyline 25 mg at bedtime. Patient reports " using cannabis for sleep.      # Gout: Continue home allopurinol 200 mg daily      # CKD stage II: baseline Creatinine 0.8-0.9 with eGFR 65-70mL/min. Check BMP on POD1. Renally dose medications. Avoid nephrotoxic agents.     # GERD: continue home PPI      # Weight loss: Reports unintentional weight loss of approximately 10 pounds. UTD with cancer screenings per patient. Recommend follow up with PCP.            The patient's care was discussed with the Bedside Nurse, Patient, and recommendations relay via note to primary Team. Medicine will continue to follow.          Remi Flores MD  Internal Medicine Staff Hospitalist   [facility]   Pager: [phone]    Team: Teresita Gallagher 18  Page Cross Cover after 5 pm: pager [phone]   ___________________________________________________________________    Subjective & Interval Hx:    Pt seen and eval  She is up and working with PT. Reports having cramps in the legs but no calf pain. Has a hemovac drain in place  No pain complaints   No dyspnea      Last 24 hr care team notes reviewed.   ROS:  4 point ROS including Respiratory, CV, GI and , other than that noted in the HPI, is negative.    Medications: Reviewed in EPIC. List below for reference    Current Facility-Administered Medications:     [START ON 8/24/2024] acetaminophen (TYLENOL) tablet 650 mg, 650 mg, Oral, Q4H PRN, Bishop PARISH Spence PA-C    acetaminophen (TYLENOL) tablet 975 mg, 975 mg, Oral, Q8H, Bishop PARISH Spence PA-C, 975 mg at 08/22/24 1012    albuterol (PROVENTIL HFA/VENTOLIN HFA) inhaler, 2 puff, Inhalation, Q6H PRN, Consuelo Coelho PA-C    allopurinol (ZYLOPRIM) tablet 200 mg, 200 mg, Oral, Daily, Consuelo Coelho PA-C, 200 mg at 08/22/24 0841    amitriptyline (ELAVIL) tablet 25 mg, 25 mg, Oral, At Bedtime, Consuelo Coelho PA-C    amLODIPine (NORVASC) tablet 10 mg, 10 mg, Oral, Daily, Consuelo Coelho PA-C, 10 mg at 08/22/24 0843    atorvastatin (LIPITOR) tablet 20 mg, 20 mg,  Oral, At Bedtime, Consuelo Coelho PA-C    benzocaine-menthol (CHLORASEPTIC) 6-10 MG lozenge 1 lozenge, 1 lozenge, Buccal, Q1H PRN, Bishop PARISH Spence PA-C    [START ON 8/24/2024] bisacodyl (DULCOLAX) suppository 10 mg, 10 mg, Rectal, Daily PRN, Bishop PARISH Spence PA-C    [Held by provider] chlorthalidone (HYGROTON) half-tab 12.5 mg, 12.5 mg, Oral, QADARYL, Consuelo Coelho PA-C    cyclobenzaprine (FLEXERIL) tablet 10 mg, 10 mg, Oral, Q8H PRN, Bishop PARISH Spence PA-C    famotidine (PEPCID) tablet 20 mg, 20 mg, Oral, BID, 20 mg at 08/22/24 1012 **OR** famotidine (PEPCID) infusion 20 mg, 20 mg, Intravenous, BID, Bishop PARISH Spence PA-C, Last Rate: 200 mL/hr at 08/21/24 1959, 20 mg at 08/21/24 1959    hydrOXYzine HCl (ATARAX) tablet 25 mg, 25 mg, Oral, TID PRN, Consuelo Coelho PA-C    lidocaine (LMX4) cream, , Topical, Q1H PRN, Bishop PARISH Spence PA-C    lidocaine 1 % 0.1-1 mL, 0.1-1 mL, Other, Q1H PRN, Bishop PARISH Spence PA-C    lidocaine 2g/250 mL D5W (ADULT STD PREMIX) ANALGESIA, 0.5 mg/kg/hr (Ideal), Intravenous, Continuous, Prior, Alejo, DO, Last Rate: 4 mL/hr at 08/21/24 2357, 0.5 mg/kg/hr at 08/21/24 2357    [Held by provider] lisinopril (ZESTRIL) tablet 40 mg, 40 mg, Oral, ROSALIND, Consuelo Coelho PA-C    [START ON 8/23/2024] magnesium hydroxide (MILK OF MAGNESIA) suspension 30 mL, 30 mL, Oral, Daily PRN, Bishop PARISH Spence PA-C    methocarbamol (ROBAXIN) tablet 750 mg, 750 mg, Oral, Q6H PRN, Bishop PARISH Spence PA-C, 750 mg at 08/22/24 1052    metoprolol succinate ER (TOPROL XL) 24 hr tablet 100 mg, 100 mg, Oral, Laquita BOYER Carolyn, PA-C, 100 mg at 08/22/24 0842    morphine (MSIR) IR half-tab 7.5 mg, 7.5 mg, Oral, Q4H PRN **OR** morphine (MSIR) IR half-tab 15 mg, 15 mg, Oral, Q4H PRN, Chiki Lal NP, 15 mg at 08/22/24 1226    morphine (PF) injection 1 mg, 1 mg, Intravenous, Q2H PRN **OR** morphine (PF) injection 2 mg, 2 mg, Intravenous, Q2H PRN, Reinaldo Armstrong MD, 2 mg at 08/22/24 0835    naloxone  "(NARCAN) injection 0.2 mg, 0.2 mg, Intravenous, Q2 Min PRN **OR** naloxone (NARCAN) injection 0.4 mg, 0.4 mg, Intravenous, Q2 Min PRN **OR** naloxone (NARCAN) injection 0.2 mg, 0.2 mg, Intramuscular, Q2 Min PRN **OR** naloxone (NARCAN) injection 0.4 mg, 0.4 mg, Intramuscular, Q2 Min PRN, Jack Dumont MD    olopatadine (PATANOL) 0.1 % ophthalmic solution 1 drop, 1 drop, Both Eyes, BID, Consuelo Coelho PA-C, 1 drop at 08/21/24 2706    ondansetron (ZOFRAN ODT) ODT tab 4 mg, 4 mg, Oral, Q6H PRN **OR** ondansetron (ZOFRAN) injection 4 mg, 4 mg, Intravenous, Q6H PRN, Bishop PARISH Spence PA-C, 4 mg at 08/21/24 1949    pantoprazole (PROTONIX) EC tablet 40 mg, 40 mg, Oral, QAM AC, Consuelo Coelho PA-C, 40 mg at 08/22/24 0844    polyethylene glycol (MIRALAX) Packet 17 g, 17 g, Oral, Daily, Bishop PARISH Spence PA-C, 17 g at 08/22/24 0843    prochlorperazine (COMPAZINE) injection 10 mg, 10 mg, Intravenous, Q6H PRN, 10 mg at 08/21/24 2046 **OR** prochlorperazine (COMPAZINE) tablet 10 mg, 10 mg, Oral, Q6H PRN, Bishop PARISH Spence PA-C    scopolamine (TRANSDERM) 72 hr patch 1 patch, 1 patch, Transdermal, Q72H **AND** scopolamine (TRANSDERM-SCOP) Patch in Place, , Transdermal, Q8H, Reinaldo Armstrong MD    senna-docusate (SENOKOT-S/PERICOLACE) 8.6-50 MG per tablet 1 tablet, 1 tablet, Oral, BID, Bishop PARISH Spence PA-C, 1 tablet at 08/22/24 0842    sodium chloride (PF) 0.9% PF flush 3 mL, 3 mL, Intracatheter, Q8H, Bishop PARISH Spence PA-C    sodium chloride (PF) 0.9% PF flush 3 mL, 3 mL, Intracatheter, q1 min prn, Bishop PARISH Spence, SANJANA    Physical Exam:    /61   Pulse 88   Temp 98.2  F (36.8  C) (Oral)   Resp 18   Ht 1.727 m (5' 8\")   Wt 89.6 kg (197 lb 8.5 oz)   SpO2 98%   BMI 30.03 kg/m       GENERAL: Alert and oriented x 3. NAD.   HEENT: Anicteric sclera. Mucous membranes moist.   CV: RRR. S1, S2. No murmurs appreciated.   RESPIRATORY: Effort normal on RA. Lungs CTAB with no wheezing, rales, rhonchi. "   GI: Abdomen soft and non distended with normoactive bowel sounds present in all quadrants. No tenderness, rebound, guarding.   NEUROLOGICAL: No focal deficits. Moves all extremities.    EXTREMITIES: Hemovac drain from the Lumbar surgical incision site   No peripheral edema. Intact bilateral pedal pulses.   SKIN: No jaundice. No rashes.     Labs & Studies of Note: I personally reviewed the following studies:  CBC RESULTS:   Recent Labs   Lab Test 08/02/24  1155   WBC 5.3   RBC 4.33   HGB 13.5   HCT 40.0   MCV 92   MCH 31.2   MCHC 33.8   RDW 13.2        Last Comprehensive Metabolic Panel:  Lab Results   Component Value Date     08/02/2024    POTASSIUM 4.7 08/02/2024    CHLORIDE 98 08/02/2024    CO2 28 08/02/2024    ANIONGAP 9 08/02/2024     (H) 08/21/2024    BUN 12.7 08/02/2024    CR 0.97 (H) 08/02/2024    GFRESTIMATED 65 08/02/2024    ADILENE 9.6 08/02/2024

## 2024-08-22 NOTE — PROGRESS NOTES
"   08/22/24 1500   Appointment Info   Signing Clinician's Name / Credentials (PT) Leigh Alex, PT, DPT   Living Environment   People in Home spouse;child(sara), adult   Current Living Arrangements house   Home Accessibility stairs to enter home   Number of Stairs, Main Entrance 3   Transportation Anticipated family or friend will provide   Self-Care   Usual Activity Tolerance good   Current Activity Tolerance moderate   Equipment Currently Used at Home none  (Does have FWW available at home)   Fall history within last six months no   Activity/Exercise/Self-Care Comment Patient reports previous IND with mobility and ADLs.   General Information   Onset of Illness/Injury or Date of Surgery 08/21/24   Referring Physician Jack Dumont MD   Patient/Family Therapy Goals Statement (PT) To go home   Pertinent History of Current Problem (include personal factors and/or comorbidities that impact the POC) Per chart review \"Zaira Naranjo is a 64 year old female with PMHx of HTN, HLD, pulmonary sarcoidosis, obesity, hepatic steatosis, CKD stage II, anxiety, gout, GERD and chronic low back pain with lumbosacral spondylosis with radiculopathy and flatback syndrome admitted on 8/21/2024, following an elective transforaminal lumbar interbody fusion L5-S1. \"   Existing Precautions/Restrictions fall;spinal   Weight-Bearing Status - LLE weight-bearing as tolerated   Weight-Bearing Status - RLE weight-bearing as tolerated   Cognition   Affect/Mental Status (Cognition) WNL   Orientation Status (Cognition) oriented x 3   Follows Commands (Cognition) WNL   Integumentary/Edema   Integumentary/Edema Comments Spinal incision with dressing covering actual incision   Range of Motion (ROM)   Range of Motion ROM is WFL   Strength (Manual Muscle Testing)   Strength (Manual Muscle Testing) strength is WFL   Bed Mobility   Bed Mobility no deficits identified   Comment, (Bed Mobility) IND, able to maintain spinal precautions throughout "   Transfers   Transfers no deficits identified   Comment, (Transfers) SBA without AD   Gait/Stairs (Locomotion)   Oliver Level (Gait) supervision   Distance in Feet (Gait) 20   Comment, (Gait/Stairs) SBA without AD, patient declined stairs this session due to increased pain   Balance   Balance Comments No overt LOB noted during transfers and gait today   Clinical Impression   Criteria for Skilled Therapeutic Intervention Yes, treatment indicated   PT Diagnosis (PT) Impaired functional mobility   Influenced by the following impairments weakness, pain   Functional limitations due to impairments bed mobility, transfers, gait, stairs   Clinical Presentation (PT Evaluation Complexity) stable   Clinical Presentation Rationale Per clinical judgement   Clinical Decision Making (Complexity) low complexity   Planned Therapy Interventions (PT) bed mobility training;gait training;home exercise program;neuromuscular re-education;patient/family education;stair training;strengthening;transfer training   Risk & Benefits of therapy have been explained evaluation/treatment results reviewed;care plan/treatment goals reviewed;participants included;patient   PT Total Evaluation Time   PT Eval, Low Complexity Minutes (77813) 12   Physical Therapy Goals   PT Frequency One time eval and treatment only   PT Predicted Duration/Target Date for Goal Attainment 08/22/24   PT Goals Bed Mobility;Transfers;Gait;PT Goal 1   PT: Bed Mobility Independent;Supine to/from sit;Within precautions;Goal Met   PT: Transfers Supervision/stand-by assist;Sit to/from stand;Bed to/from chair;Within precautions;Goal Met   PT: Gait Supervision/stand-by assist;Within precautions;Goal Met   PT: Goal 1 Patient will be IND with HEP in order to improve strength and overall IND with mobility   Interventions   Interventions Quick Adds Therapeutic Procedure   Therapeutic Procedure/Exercise   Ther. Procedure: strength, endurance, ROM, flexibillity Minutes (30124) 12    Symptoms Noted During/After Treatment fatigue   Treatment Detail/Skilled Intervention Patient with questions regarding HEP exercises she can perform while maintaining her spinal precautions. Educated patient on standing exercises and safe technique when performing them, patient verbalized understanding. Addressed patient's questions regarding previous exercises she had performed following back pain, including stretches. Educated patient and demonstrated safe gastroc/hamstring stretch to perform within spinal precautions, patient verbalized understanding. Patient supine in bed with call light within reach at end of session.   PT Discharge Planning   PT Plan D/c from PT, OT to follow and address mobility needs   PT Discharge Recommendation (DC Rec) home;home with assist   PT Rationale for DC Rec Patient is IND with bed mobility, SBA for transfers and gait. Anticipate patient will be safe to discharge home with assist from family once medically cleared to do so.   PT Brief overview of current status IND bed mobility, SBA transfers and gait; OT to follow and address additional mobility needs.   Total Session Time   Timed Code Treatment Minutes 12   Total Session Time (sum of timed and untimed services) 24     Physical Therapy Discharge Summary    Reason for therapy discharge:    All goals and outcomes met, no further needs identified.    Progress towards therapy goal(s). See goals on [facility] Plan [facility] record for goal details.  Goals met    Therapy recommendation(s):    Patient is currently IND with bed mobility and SBA for transfers and gait. She does have stairs to navigate at home; however, anticipate OT to address mobility needs while inpatient. No further skilled physical therapy services identified. Will complete orders. Defer additional recommendations to OT and medical team.

## 2024-08-22 NOTE — PROGRESS NOTES
"VS: /68 (BP Location: Right arm)   Pulse 81   Temp 99.2  F (37.3  C) (Oral)   Resp 16   Ht 1.727 m (5' 8\")   Wt 89.6 kg (197 lb 8.5 oz)   SpO2 96%   BMI 30.03 kg/m      O2: SpO2 > 96. On 2 liters NC at night when sleeping LS clear and equal bilaterally. Denies chest pain and SOB.    Output: Rodriguez catheter in place and patent    Last BM: Denies abd discomfort / c/plains of intermittent nausea    Activity:    Skin: WDL except, lower thoraxic and posterior back incision    Pain: Pain managed with morphine , Lidocaine infusion    CMS: Intact, AOx4. Denies numbness and tingling.   Dressing: CDI   Diet:  C/plains nausea/vomiting.    LDA: L/t and r/t PIV , drain , rodriguez catheter    Equipment: IV pole, personal belongings, SCDS   Plan: precautions maintained / Continue with plan of care. Call light within reach, pt able to make needs known.    Additional Info: Per patient oxycodone  makes her feel sick        "

## 2024-08-22 NOTE — PHARMACY-ADMISSION MEDICATION HISTORY
Pharmacist Admission Medication History    Admission medication history is complete. The information provided in this note is only as accurate as the sources available at the time of the update.    Information Source(s): Patient via in-person    Pertinent Information: n/a    Changes made to PTA medication list:  Added: None  Deleted: None  Changed: None    Allergies reviewed with patient and updates made in EHR: no    Medication History Completed By: Ruben Castro [facility] 8/21/2024 7:17 PM    Prior to Admission medications    Medication Sig Last Dose Taking? Auth Provider Long Term End Date   albuterol (PROAIR HFA/PROVENTIL HFA/VENTOLIN HFA) 108 (90 Base) MCG/ACT inhaler Inhale 2 puffs into the lungs every 6 hours as needed for shortness of breath, wheezing or cough More than a month Yes Wolfgang Marroquin,  Yes    allopurinol (ZYLOPRIM) 100 MG tablet Take 2 tablets (200 mg) by mouth daily 8/20/2024 at 2230 Yes Evy Diaz APRN CNP     amitriptyline (ELAVIL) 25 MG tablet Take 1 tablet (25 mg) by mouth at bedtime 8/20/2024 at 2230 Yes Evy Diaz APRN CNP Yes    amLODIPine (NORVASC) 10 MG tablet Take 1 tablet (10 mg) by mouth daily 8/21/2024 at 0500 Yes Evy Diaz APRN CNP Yes    atorvastatin (LIPITOR) 20 MG tablet TAKE 1 TABLET BY MOUTH DAILY AT BEDTIME 8/20/2024 at 2230 Yes Evy Diaz APRN CNP Yes    cetirizine HCl (ZYRTEC ALLERGY) 10 MG CAPS Take 1 tablet by mouth daily 8/20/2024 at 0700 Yes Reported, Patient     chlorthalidone (HYGROTON) 25 MG tablet 1/2 tablet daily  Patient taking differently: Take 12.5 mg by mouth every morning. 1/2 tablet daily 8/20/2024 at 0700 Yes Evy Diaz APRN CNP Yes    cyclobenzaprine (FLEXERIL) 10 MG tablet Take 1 tablet (10 mg) by mouth 2 times daily as needed for muscle spasms Unknown Yes Sharri Montalvo, NP     fluticasone (FLONASE) 50 MCG/ACT nasal spray Spray 2 sprays in nostril daily 8/20/2024 at 0700 Yes  Evy Diaz APRN CNP     hydrOXYzine HCl (ATARAX) 25 MG tablet TAKE 1 TABLET BY MOUTH 3 TIMES DAILY AS NEEDED FOR ANXIETY Past Month Yes Evy Diaz APRN CNP     ibuprofen (ADVIL/MOTRIN) 200 MG tablet Take 200 mg by mouth every 4 hours as needed for pain 8/20/2024 at 0700 Yes Reported, Patient     lisinopril (ZESTRIL) 40 MG tablet Take 1 tablet (40 mg) by mouth daily 8/20/2024 at 0700 Yes Evy Diaz APRN CNP Yes    metoprolol succinate ER (TOPROL XL) 100 MG 24 hr tablet TAKE 1 TABLET BY MOUTH DAILY. DO NOT CRUSH OR CHEW  Patient taking differently: 100 mg every morning. TAKE 1 TABLET BY MOUTH DAILY. DO NOT CRUSH OR CHEW 8/21/2024 at 0500 Yes Evy Diaz APRN CNP Yes    olopatadine (PATANOL) 0.1 % ophthalmic solution Place 1 drop into both eyes 2 times daily Past Week Yes Evy Diaz APRN CNP     omeprazole (PRILOSEC) 20 MG DR capsule TAKE 1 CAPSULE BY MOUTH DAILY BEFORE A MEAL. DO NOT CRUSH.  Patient taking differently: 20 mg every morning. TAKE 1 CAPSULE BY MOUTH DAILY BEFORE A MEAL. DO NOT CRUSH. 8/21/2024 at 0500 Yes Evy Diaz APRN CNP

## 2024-08-23 ENCOUNTER — APPOINTMENT (OUTPATIENT)
Dept: OCCUPATIONAL THERAPY | Facility: CLINIC | Age: 64
DRG: 455 | End: 2024-08-23
Attending: ORTHOPAEDIC SURGERY
Payer: COMMERCIAL

## 2024-08-23 ENCOUNTER — APPOINTMENT (OUTPATIENT)
Dept: GENERAL RADIOLOGY | Facility: CLINIC | Age: 64
DRG: 455 | End: 2024-08-23
Attending: NURSE PRACTITIONER
Payer: COMMERCIAL

## 2024-08-23 VITALS
TEMPERATURE: 99.5 F | BODY MASS INDEX: 29.94 KG/M2 | HEIGHT: 68 IN | RESPIRATION RATE: 18 BRPM | OXYGEN SATURATION: 96 % | SYSTOLIC BLOOD PRESSURE: 126 MMHG | WEIGHT: 197.53 LBS | HEART RATE: 76 BPM | DIASTOLIC BLOOD PRESSURE: 68 MMHG

## 2024-08-23 PROCEDURE — 250N000013 HC RX MED GY IP 250 OP 250 PS 637: Performed by: PHYSICIAN ASSISTANT

## 2024-08-23 PROCEDURE — 999N000065 XR LUMBAR SPINE 2/3 VIEWS

## 2024-08-23 PROCEDURE — 97535 SELF CARE MNGMENT TRAINING: CPT | Mod: GO

## 2024-08-23 PROCEDURE — 250N000013 HC RX MED GY IP 250 OP 250 PS 637: Performed by: NURSE PRACTITIONER

## 2024-08-23 PROCEDURE — 99232 SBSQ HOSP IP/OBS MODERATE 35: CPT | Performed by: INTERNAL MEDICINE

## 2024-08-23 RX ORDER — MORPHINE SULFATE 15 MG/1
7.5-15 TABLET ORAL EVERY 4 HOURS PRN
Qty: 32 TABLET | Refills: 0 | Status: SHIPPED | OUTPATIENT
Start: 2024-08-23 | End: 2024-08-27

## 2024-08-23 RX ORDER — ACETAMINOPHEN 325 MG/1
975 TABLET ORAL EVERY 8 HOURS
COMMUNITY
Start: 2024-08-23 | End: 2024-08-27

## 2024-08-23 RX ORDER — METHOCARBAMOL 750 MG/1
750 TABLET, FILM COATED ORAL EVERY 6 HOURS PRN
Qty: 35 TABLET | Refills: 0 | Status: SHIPPED | OUTPATIENT
Start: 2024-08-23 | End: 2024-08-27

## 2024-08-23 RX ORDER — CYCLOBENZAPRINE HCL 10 MG
10 TABLET ORAL EVERY 8 HOURS PRN
Qty: 30 TABLET | Refills: 0 | Status: SHIPPED | OUTPATIENT
Start: 2024-08-23 | End: 2024-08-27

## 2024-08-23 RX ORDER — POLYETHYLENE GLYCOL 3350 17 G/17G
17 POWDER, FOR SOLUTION ORAL DAILY
Qty: 510 G | Refills: 0 | Status: SHIPPED | OUTPATIENT
Start: 2024-08-23 | End: 2024-09-18

## 2024-08-23 RX ORDER — AMOXICILLIN 250 MG
1 CAPSULE ORAL 2 TIMES DAILY
COMMUNITY
Start: 2024-08-23 | End: 2024-08-26

## 2024-08-23 RX ADMIN — ALLOPURINOL 200 MG: 100 TABLET ORAL at 09:17

## 2024-08-23 RX ADMIN — Medication 15 MG: at 01:49

## 2024-08-23 RX ADMIN — OLOPATADINE HYDROCHLORIDE 1 DROP: 1 SOLUTION OPHTHALMIC at 09:21

## 2024-08-23 RX ADMIN — AMLODIPINE BESYLATE 10 MG: 5 TABLET ORAL at 09:16

## 2024-08-23 RX ADMIN — POLYETHYLENE GLYCOL 3350 17 G: 17 POWDER, FOR SOLUTION ORAL at 09:17

## 2024-08-23 RX ADMIN — ACETAMINOPHEN 975 MG: 325 TABLET ORAL at 09:16

## 2024-08-23 RX ADMIN — ACETAMINOPHEN 975 MG: 325 TABLET ORAL at 00:47

## 2024-08-23 RX ADMIN — METOPROLOL SUCCINATE 100 MG: 50 TABLET, EXTENDED RELEASE ORAL at 09:17

## 2024-08-23 RX ADMIN — SENNOSIDES AND DOCUSATE SODIUM 1 TABLET: 50; 8.6 TABLET ORAL at 09:16

## 2024-08-23 RX ADMIN — Medication 15 MG: at 10:58

## 2024-08-23 RX ADMIN — Medication 15 MG: at 05:54

## 2024-08-23 RX ADMIN — METHOCARBAMOL 750 MG: 750 TABLET ORAL at 09:16

## 2024-08-23 RX ADMIN — FAMOTIDINE 20 MG: 20 TABLET ORAL at 09:17

## 2024-08-23 RX ADMIN — PANTOPRAZOLE SODIUM 40 MG: 40 TABLET, DELAYED RELEASE ORAL at 09:17

## 2024-08-23 ASSESSMENT — ACTIVITIES OF DAILY LIVING (ADL)
ADLS_ACUITY_SCORE: 31

## 2024-08-23 NOTE — PLAN OF CARE
End of shift Summary: See flowsheet for VS and detail assessments.     Changes this Shift:      Pulmonary: LS clear throughout all lobes, denies shortness of breath, no cough present. Remains on RA.     Output: Continent of bowel and bladder.      Activity: Up SBA w/ walker and gait belt     Skin: No concerns     Pain: C/o moderate pain, see MAR for med admin     Neuro/CMS: A&Ox4, CMS intact, endorses numbness to second R toe    Dressings/Drains: Dressings C/D/I, hemovac outputting very little     IV: L PIV running Lidocaine @ 4mL/hr w/ NS running @ 30mL/hr.    Additional info: Pt is eager to discharge and is planning on discharging tomorrow around 1500.      Plan: Continue POC        Plan of Care Reviewed With: patient    Overall Patient Progress: improving

## 2024-08-23 NOTE — PLAN OF CARE
Goal Outcome Evaluation:      Plan of Care Reviewed With: patient, spouse, child    Overall Patient Progress: improvingOverall Patient Progress: improving    Pt. discharged at  around 1430 to home and left with personal belongings. Spouse and daughter present and will provide transportation. Pt. received complete discharge paperwork and medications as filled by discharge pharmacy. Pt received and signed for the narcotic medication (morphine) .Pt. was given times of last dose for all discharge medications in writing on discharge medication sheets. Discharge teaching included medication, pain management, activity restrictions, dressing changes, and signs and symptoms of infection. Dressing supplies sent home. Pt. to follow up with Dr. Dumont at the clinic on Oct. 3rd. Pt. had no further questions at the time of discharge and no unmet needs were identified.

## 2024-08-23 NOTE — PROGRESS NOTES
"VS: /57   Pulse 80   Temp 98.7  F (37.1  C) (Oral)   Resp 17   Ht 1.727 m (5' 8\")   Wt 89.6 kg (197 lb 8.5 oz)   SpO2 95%   BMI 30.03 kg/m      O2: SpO2 > 96. On  1 liters NC at night when sleeping LS clear and equal bilaterally. Denies chest pain and SOB.    Output: Patient is voiding fine without any issues    Last BM: Denies abd discomfort    Activity: 1 person  assist ,ambulate to the bathroom    Skin: WDL except, lower thoraxic and posterior back incision    Pain: Pain managed with morphine , Lidocaine infusion    CMS: Intact, AOx4. Denies numbness and tingling.   Dressing:  Dressing to the back reinforced , CDI   Diet:  Regular diet    LDA: L/t and r/t PIV    Equipment: IV pole, personal belongings, SCDS   Plan: precautions maintained / Continue with plan of care. Call light within reach, pt able to make needs known.    Additional Info: Hemovac drain got pulled out. Dressing reinforced . MD made aware , Plan to discharge today at 1500pm     "

## 2024-08-23 NOTE — DISCHARGE SUMMARY
ORTHOPAEDIC DISCHARGE SUMMARY     Date of Admission: 8/21/2024  Date of Discharge: 8/23/2024  2:42 PM  Disposition: Home  Staff Physician: No att. providers found  Primary Care Provider: Evy Diaz    DISCHARGE DIAGNOSIS:  Lumbosacral spondylosis with radiculopathy [M47.27]  Flatback syndrome of lumbosacral region [M40.37]    PROCEDURES: Procedure(s):  Transforaminal Lumbar Interbody Fusion with De La Cruz-Miller Osteotomy Lumbar 5-Sacral 1; Use of Infuse Bone Morphogenetic Protein and Allograft on 8/21/2024    HOSPITAL COURSE:    Surgery was uncomplicated. Zaira Naranjo has done well post-operatively. Medicine was consulted post operatively to aid in management of medical comorbidities. See final recommendations below. The patient received routine nursing cares and is medically stable. Vital signs are stable. The patient is tolerating a regular diet without GI distress/nausea or vomiting. Voiding spontaneously. All PT/OT goals have been met for safe mobility. Pain is now controlled on oral medications which will be available on discharge. Stool softeners have been used while taking pain medications to help prevent constipation. Zaira Naranjo is deemed medically safe to discharge.     Antibiotics:  Ancef given periop and 24 hours postop.  DVT prophylaxis:  Ambulatory with SCD's  PT Progress:  Has met PT/OT goals for safe mobility.   Pain Meds:  Weaned off all IV pain meds by discharge.  Inpatient Events: No significant events or complications.     Discharge orders and instructions as below.    FOLLOWUP:    Future Appointments   Date Time Provider Department Center   10/3/2024 11:40 AM Jack Dumont MD [facility]       Appointments on [facility]. Call [phone] if you haven't heard regarding these appointments within 7 days of discharge.    PLANNED DISCHARGE ORDERS:     DVT Prophylaxis: Ambulatory      Discharge Medication List as of 8/23/2024  1:51 PM         START taking these medications    Details   acetaminophen (TYLENOL) 325 MG tablet Take 3 tablets (975 mg) by mouth every 8 hours., OTC      methocarbamol (ROBAXIN) 750 MG tablet Take 1 tablet (750 mg) by mouth every 6 hours as needed for muscle spasms., Disp-35 tablet, R-0, E-Prescribe      morphine (MSIR) 15 MG IR tablet Take 0.5-1 tablets (7.5-15 mg) by mouth every 4 hours as needed for moderate to severe pain., Disp-32 tablet, R-0, E-Prescribe      polyethylene glycol (MIRALAX) 17 GM/Dose powder Take 17 g by mouth daily., Disp-510 g, R-0, E-Prescribe      senna-docusate (SENOKOT-S/PERICOLACE) 8.6-50 MG tablet Take 1 tablet by mouth 2 times daily., OTC           CONTINUE these medications which have CHANGED    Details   cyclobenzaprine (FLEXERIL) 10 MG tablet Take 1 tablet (10 mg) by mouth every 8 hours as needed for muscle spasms., Disp-30 tablet, R-0, E-Prescribe           CONTINUE these medications which have NOT CHANGED    Details   albuterol (PROAIR HFA/PROVENTIL HFA/VENTOLIN HFA) 108 (90 Base) MCG/ACT inhaler Inhale 2 puffs into the lungs every 6 hours as needed for shortness of breath, wheezing or cough, Disp-18 g, R-0, E-PrescribePharmacy may dispense brand covered by insurance (Proair, or proventil or ventolin or generic albuterol inhaler)      allopurinol (ZYLOPRIM) 100 MG tablet Take 2 tablets (200 mg) by mouth daily, Disp-180 tablet, R-3, E-Prescribe      amitriptyline (ELAVIL) 25 MG tablet Take 1 tablet (25 mg) by mouth at bedtime, Disp-90 tablet, R-2, E-Prescribe      amLODIPine (NORVASC) 10 MG tablet Take 1 tablet (10 mg) by mouth daily, Disp-90 tablet, R-3, E-Prescribe      atorvastatin (LIPITOR) 20 MG tablet TAKE 1 TABLET BY MOUTH DAILY AT BEDTIME, Disp-90 tablet, R-1, E-Prescribe      cetirizine HCl (ZYRTEC ALLERGY) 10 MG CAPS Take 1 tablet by mouth daily, Historical      chlorthalidone (HYGROTON) 25 MG tablet 1/2 tablet daily, Disp-45 tablet, R-3, E-Prescribe      fluticasone (FLONASE) 50  "MCG/ACT nasal spray Spray 2 sprays in nostril daily, Disp-18.2 mL, R-11, E-Prescribe      hydrOXYzine HCl (ATARAX) 25 MG tablet TAKE 1 TABLET BY MOUTH 3 TIMES DAILY AS NEEDED FOR ANXIETY, Disp-30 tablet, R-4, E-Prescribe      lisinopril (ZESTRIL) 40 MG tablet Take 1 tablet (40 mg) by mouth daily, Disp-90 tablet, R-3, E-Prescribe      metoprolol succinate ER (TOPROL XL) 100 MG 24 hr tablet TAKE 1 TABLET BY MOUTH DAILY. DO NOT CRUSH OR CHEW, Disp-90 tablet, R-3, E-Prescribe      olopatadine (PATANOL) 0.1 % ophthalmic solution Place 1 drop into both eyes 2 times daily, Disp-5 mL, R-1, E-Prescribe      omeprazole (PRILOSEC) 20 MG DR capsule TAKE 1 CAPSULE BY MOUTH DAILY BEFORE A MEAL. DO NOT CRUSH., Disp-90 capsule, R-3, E-Prescribe           STOP taking these medications       ibuprofen (ADVIL/MOTRIN) 200 MG tablet Comments:   Reason for Stopping:                 Discharge Procedure Orders   Reason for your hospital stay   Order Comments: Lumbar fusion     Brief Discharge Instructions   Order Comments: Post-operative Discharge Instructions:     WOUND CARE:  You have a dressing on your incision which can be worn for up to 7 days, and it can be worn in the shower. After the dressing has been removed, you do not need a dressing. There is \"surgical glue\" directly over the incision. This will fall off on its own, which can take up to 2 weeks. It is okay to shower and wash gently with soap and water. Do not soak in the bath. No pools, hot tubs, or lakes for 6 weeks.      After surgery, you may have a sensitive scar.  When the incision has fully healed, you may massage the scar to decrease sensitivity and help break down scar tissue. Do this up to 4 times per day.   DIET & EXERCISE:  - When you get home, you may resume your normal diet as tolerated. You may not be very hungry but try to eat small healthy meals to help you heal. Remember to drink plenty of water/fluids to help keep you hydrated.     - You will be seen in the " clinic at 6 weeks following surgery. You will not need to attend physical therapy during this time. You can focus on cardiovascular fitness by walking as much as you can tolerate. Avoid bending, lifting, and twisting. Your weight lifting restriction is 10 pounds until your first follow-up appointment.       PAIN MEDICATIONS:  For postoperative pain control, we have prescribed a variety of medications. Tylenol has been prescribed and should be taken as part of the complete pain management regimen. You may also have been prescribed a muscle relaxer to be taken as needed for muscle spasms. Other pain management techniques include icing the surgical area (for 15 minutes on, 15 minutes off) throughout the day to help with inflammation. Avoid NSAIDs (ibuprofen, Aleve, Advil, etc) for the first 12 weeks after surgery, unless approved by your surgeon.     You also received a prescription for a opioid medication.  This should be taken for the first few weeks following surgery.  As pain improves, decrease the amount you take (see tapering plan below). Opioid have numerous side effects including nausea, constipation, and drowsiness. If you experience nausea, this may be relieved by taking the medication with food or a light meal. To avoid constipation, please use an over-the-counter stool softeners and drink lots of water and eat fruits and vegetables. No operating heavy machinery or driving an automobile while on opioid medications.        Tapering opioids: As your pain symptoms improve, focus your efforts on decreasing (tapering) use of opioid medications. The most successful tapering strategy is to first, decrease the number of tablets you take every 4-6 hours to the minimum prescribed. Then, increase the amount of time between doses.  For example:  First, taper to   or 1 tablet every 4-6 hours.  Then, taper to   or 1 tablet every 6-8 hours.  Then, taper to   or 1 tablet every 8-10 hours.  Then, taper to   or 1 tablet every  "10-12 hours.  Then, taper to   or 1 tablet at bedtime.  The bedtime dose can help with comfort during sleep and is typically the last dose to be discontinued after surgery.     Call the orthopedic clinic at [phone] several business days in advance of when you need a refill. Early refills will not be provided, and you may not take more than what is prescribed. Inform the nurse how much of the medication you are taking and how many tablets you have left.     WHEN TO CALL:  Please call or return if you experience the following:  - Fevers (temperature greater than 100.4 degrees Fahrenheit).  - Pain that is getting worse or does not respond to pain medications.  - Drainage from your wound.  - Increasing redness about the wound.  - Changes in strength or sensation to your arms/legs.   - Any other worrisome symptoms.     You may reach the clinic by dialing [phone].  After hours, you may reach the resident on call by dialing [phone].      When to call - Contact Surgeon Team   Order Comments: You may experience symptoms that require follow-up before your scheduled appointment. Refer to the \"Stoplight Tool\" for instructions on when to contact your Surgeon Team if you are concerned about pain control, blood clots, constipation, or if you are unable to urinate.     When to call - Reach out to Urgent Care   Order Comments: If you are not able to reach your Surgeon Team and you need immediate care, go to the Orthopedic Walk-in Clinic or Urgent Care at your Surgeon's office.  Do NOT go to the Emergency Room unless you have shortness of breath, chest pain, or other signs of a medical emergency.     When to call - Reasons to Call 911   Order Comments: Call 911 immediately if you experience sudden-onset chest pain, arm weakness/numbness, slurred speech, or shortness of breath     Discharge Instruction - Breathing exercises   Order Comments: Perform breathing exercises using your Incentive Spirometer 10 times per hour " while awake for 2 weeks.     Symptoms - Fever Management   Order Comments: A low grade fever can be expected after surgery.  Use acetaminophen (TYLENOL) as needed for fever management.  Contact your Surgeon Team if you have a fever greater than 101.5 F, chills, and/or night sweats.     Symptoms - Constipation management   Order Comments: Constipation (hard, dry bowel movements) is expected after surgery due to the combination of being less active, the anesthetic, and the opioid pain medication.  You can do the following to help reduce constipation:  ~  FLUIDS:  Drink clear liquids (water or Gatorade), or juice (apple/prune).  ~  DIET:  Eat a fiber rich diet.    ~  ACTIVITY:  Get up and move around several times a day.  Increase your activity as you are able.  MEDICATIONS:  Reduce the risk of constipation by starting medications before you are constipated.  You can take Miralax   (1 packet as directed) and/or a stool softener (Senokot 1-2 tablets 1-2 times a day).  If you already have constipation and these medications are not working, you can get magnesium citrate and use as directed.  If you continue to have constipation you can try an over the counter suppository or enema.  Call your Surgeon Team if it has been greater than 3 days since your last bowel movement.     Symptoms - Reduced Urine Output   Order Comments: Changes in the amount of fluids you drank before and after surgery may result in problems urinating.  It is important to stay well-hydrated after surgery and drink plenty of water. If it has been greater than 8 hours since you have urinated despite drinking plenty of water, call your Surgeon Team.     Activity - Exercises to prevent blood clots   Order Comments: Unless otherwise directed by your Surgeon team, perform the following exercises at least three times per day for the first four weeks after surgery to prevent blood clots in your legs: 1) Point and flex your feet (Ankle Pumps), 2) Move your  ankle around in big circles, 3) Wiggle your toes, 4) Walk, even for short distances, several times a day, will help decrease the risk of blood clots.     Order Specific Question Answer Comments   Is discharge order? Yes      Comfort and Pain Management - Pain after Surgery   Order Comments: Pain after surgery is normal and expected.  You will have some amount of pain for several weeks after surgery.  Your pain will improve with time.  There are several things you can do to help reduce your pain including: rest, ice, elevation, and using pain medications as needed. Contact your Surgeon Team if you have pain that persists or worsens after surgery despite rest, ice, elevation, and taking your medication(s) as prescribed. Contact your Surgeon Team if you have new numbness, tingling, or weakness in your operative extremity.     Comfort and Pain Management - Swelling after Surgery   Order Comments: Swelling and/or bruising of the surgical extremity is common and may persist for several months after surgery. In addition to frequent icing and elevation, gentle compressive support with an ACE wrap or tubigrip may help with swelling. Apply compression regularly, removing at least twice daily to perform skin checks. Contact your Surgeon Team if your swelling increases and is NOT associated with an increase in your activity level, or if your swelling increases and is associated with redness and pain.     Comfort and Pain Management - Cold therapy   Order Comments: Ice can be used to control swelling and discomfort after surgery. Place a thin towel over your operative site and apply the ice pack overtop. Leave ice pack in place for 20 minutes, then remove for 20 minutes. Repeat this 20 minutes on/20 minutes off routine as often as tolerated.     Medication Instructions - Acetaminophen (TYLENOL) Instructions   Order Comments: You were discharged with acetaminophen (TYLENOL) for pain management after surgery. Acetaminophen most  effectively manages pain symptoms when it is taken on a schedule without missing doses (every four, six, or eight hours). Your Provider will prescribe a safe daily dose between 3000 - 4000 mg.  Do NOT exceed this daily dose. Most patients use acetaminophen for pain control for the first four weeks after surgery.  You can wean from this medication as your pain decreases.     Medication Instructions - NSAID Instructions   Order Comments: Do not use NSAIDs x 12 weeks. Some common anti-inflammatories include: ibuprofen (ADVIL, MOTRIN), naproxen (ALEVE, NAPROSYN), celecoxib (CELEBREX), meloxicam (MOBIC), ketorolac (TORADOL).     Medication Instructions - Muscle relaxant Medication Instructions   Order Comments: You were discharged with a muscle relaxer medication that can be used in conjunction with acetaminophen (TYLENOL) and the opioid medication to manage pain symptoms. Take the muscle relaxant medication exactly as directed.     Follow Up Care   Order Comments: Follow-up with your Surgeon Team in 6 weeks for wound check.     Medication instructions - No pharmacologic VTE prophylaxis prescribed   Order Comments: Your Surgeon did not prescribe medication for anticoagulation.     Opioid Instructions (Less than 65 years)   Order Comments: You were discharged with an opioid medication (hydromorphone, oxycodone, hydrocodone, or tramadol). This medication should only be taken for breakthrough pain that is not controlled with acetaminophen (TYLENOL). If you rate your pain less than 3 you do not need this medication. Pain rating 0-3: You do not need this medication. Pain rating 4-6: Take 1 tablet every 4-6 hours as needed Pain rating 7-10: Take 2 tablets every 4-6 hours as needed. Do not exceed 6 tablets per day     Medication Instructions - Opioids - Tapering Instructions   Order Comments: In the first three days following surgery, your symptoms may warrant use of the narcotic pain medication every four to six hours as  prescribed. This is normal. As your pain symptoms improve, focus your efforts on decreasing (tapering) use of narcotic medications. The most successful tapering strategy is to first, decrease the number of tablets you take every 4-6 hours to the minimum prescribed. Then, increase the amount of time between doses. For example: First, taper to   or 1 tablet every 4-6 hours. Then, taper to   or 1 tablet every 6-8 hours. Then, taper to   or 1 tablet every 8-10 hours. Then, taper to   or 1 tablet every 10-12 hours. Then, taper to   or 1 tablet at bedtime. The bedtime dose can help with comfort during sleep and is typically the last dose to be discontinued after surgery.     Discharge Instruction - Regular Diet Adult   Order Comments: Return to your pre-surgery diet unless instructed otherwise     Order Specific Question Answer Comments   Is discharge order? Yes          Alejo Saba, DO  Spine Fellow

## 2024-08-23 NOTE — PLAN OF CARE
Occupational Therapy Discharge Summary    Reason for therapy discharge:    All goals and outcomes met, no further needs identified.    Progress towards therapy goal(s). See goals on Care Plan in [facility] electronic health record for goal details.  Goals met    Therapy recommendation(s):    Defer to ortho.

## 2024-08-23 NOTE — PROGRESS NOTES
Patient was noticed with hemovac drain pulled out from the incision site with small amount of blood under the dressing on the back . Gauze applied and dressing reinforced . Dr Johnny Mota made aware .

## 2024-08-23 NOTE — PROGRESS NOTES
"        [facility] - Internal Medicine Daily Note   Date of Service: 8/22/2024  Patient: Zaira Naranjo  MRN: [id number]  Admission Date: 8/21/2024  Hospital Day # 2    Assessment & Plan    Zaira Naranjo is a 64 year old female with PMHx of HTN, HLD, pulmonary sarcoidosis, obesity, hepatic steatosis, CKD stage II, anxiety, gout, GERD and chronic low back pain with lumbosacral spondylosis with radiculopathy and flatback syndrome admitted on 8/21/2024, following an elective transforaminal lumbar interbody fusion L5-S1. Medicine consulted for medical co-management.      # Post operative care following transforaminal lumbar interbody fusion L5-S1 on 08/21  Patient underwent the above procedure by Dr. Jack Dumont under [place].  cc. Received perioperative ancef. No reported complications. Hemovac drain in place.   - Pain management per primary team: APAP 975 mg TID, oxycodone 5-10 mg Q4H PRN, IV morphine 1-2 mg Q2H PRN, Robaxin 750 mg QID PRN, flexeril 10 mg Q8H, lidocaine gtt   - Hemovac drain management per primary team   - DVT ppx per primary team: SCDs  - No NSAIDs x3 months.   - Zuñiga catheter in place, to be removed on POD 1 per primary team   - WBAT. Up with assist until independent. No excessive bending or twisting. No lifting >10 lbs x 6 weeks   - PT/OT  - Bowel regimen       # HTN:   - Continue home amlodipine 10 mg daily and metoprolol  mg daily with holding parameters.   - Hold home lisinopril 40 mg daily and Chlorthalidone 12.5 mg daily and resume once BP and creatinine tolerates     # HLD: Continue home atorvastatin 20 mg daily       # Pulmonary sarcoidosis: patient reports having \"inactive\" pulmonary sarcoidosis. Denies any respiratory symptoms. No longer following with pulmonology. See last chest CT from January 2024 in imaging.   - continue home albuterol PRN      # Anxiety, insomnia: Continue home atarax 25 mg TID PRN and amitriptyline 25 mg at bedtime. Patient reports using " cannabis for sleep.      # Gout: Continue home allopurinol 200 mg daily      # CKD stage II: baseline Creatinine 0.8-0.9 with eGFR 65-70mL/min. Check BMP on POD1. Renally dose medications. Avoid nephrotoxic agents.     # GERD: continue home PPI      # Weight loss: Reports unintentional weight loss of approximately 10 pounds. UTD with cancer screenings per patient. Recommend follow up with PCP.            The patient's care was discussed with the Bedside Nurse, Patient, and recommendations relay via note to primary Team. Medicine will continue to follow.          Remi Flores MD  Internal Medicine Staff Hospitalist   [facility]   Pager: [phone]    Team: Teresita Gallagher 18  Page Cross Cover after 5 pm: pager [phone]   ___________________________________________________________________    Subjective & Interval Hx:    No acute over night issues   Pt seen and eval  She is up and working with PT. Reports having cramps in the legs but no calf pain. Has a hemovac drain in place  No pain complaints   No dyspnea      Last 24 hr care team notes reviewed.   ROS:  4 point ROS including Respiratory, CV, GI and , other than that noted in the HPI, is negative.    Medications: Reviewed in EPIC. List below for reference    Current Facility-Administered Medications:     [START ON 8/24/2024] acetaminophen (TYLENOL) tablet 650 mg, 650 mg, Oral, Q4H PRN, Bishop PARISH Spence PA-C    acetaminophen (TYLENOL) tablet 975 mg, 975 mg, Oral, Q8H, Bishop PARISH Spence PA-PARISH, 975 mg at 08/23/24 0916    albuterol (PROVENTIL HFA/VENTOLIN HFA) inhaler, 2 puff, Inhalation, Q6H PRN, Consuelo Coelho PA-C    allopurinol (ZYLOPRIM) tablet 200 mg, 200 mg, Oral, Daily, Consuelo Coelho PA-C, 200 mg at 08/23/24 0917    amitriptyline (ELAVIL) tablet 25 mg, 25 mg, Oral, At Bedtime, Consuelo Coelho PA-C, 25 mg at 08/22/24 9136    amLODIPine (NORVASC) tablet 10 mg, 10 mg, Oral, Daily, Consuelo Coelho PA-C, 10 mg at 08/23/24  0916    atorvastatin (LIPITOR) tablet 20 mg, 20 mg, Oral, At Bedtime, Consuelo Coelho PA-C, 20 mg at 08/22/24 2145    benzocaine-menthol (CHLORASEPTIC) 6-10 MG lozenge 1 lozenge, 1 lozenge, Buccal, Q1H PRN, Bishop PARISH Spence PA-C    [START ON 8/24/2024] bisacodyl (DULCOLAX) suppository 10 mg, 10 mg, Rectal, Daily PRN, Bishop PARISH Spence PA-C    [Held by provider] chlorthalidone (HYGROTON) half-tab 12.5 mg, 12.5 mg, Oral, ROSALIND, Consuelo Coelho PA-C    cyclobenzaprine (FLEXERIL) tablet 10 mg, 10 mg, Oral, Q8H PRN, Bishop PARISH Spence PA-C    famotidine (PEPCID) tablet 20 mg, 20 mg, Oral, BID, 20 mg at 08/23/24 0917 **OR** famotidine (PEPCID) infusion 20 mg, 20 mg, Intravenous, BID, Bishop PARISH Spence PA-C, Last Rate: 200 mL/hr at 08/21/24 1959, 20 mg at 08/21/24 1959    hydrOXYzine HCl (ATARAX) tablet 25 mg, 25 mg, Oral, TID PRN, Consuelo Coelho PA-C    lidocaine (LMX4) cream, , Topical, Q1H PRN, Bishop PARISH Spence PA-C    lidocaine 1 % 0.1-1 mL, 0.1-1 mL, Other, Q1H PRN, Bishop PARISH Spence PA-C    [Held by provider] lisinopril (ZESTRIL) tablet 40 mg, 40 mg, Oral, QADARYL, Consuelo Coelho PA-C    magnesium hydroxide (MILK OF MAGNESIA) suspension 30 mL, 30 mL, Oral, Daily PRN, Bishop PARISH Sepnce PA-C    methocarbamol (ROBAXIN) tablet 750 mg, 750 mg, Oral, Q6H PRN, Bishop PARISH Spence PA-C, 750 mg at 08/23/24 0916    metoprolol succinate ER (TOPROL XL) 24 hr tablet 100 mg, 100 mg, Oral, ROSALIND, Consuelo Coelho PA-C, 100 mg at 08/23/24 0917    morphine (MSIR) IR half-tab 7.5 mg, 7.5 mg, Oral, Q4H PRN **OR** morphine (MSIR) IR half-tab 15 mg, 15 mg, Oral, Q4H PRN, Chiki Lal, NP, 15 mg at 08/23/24 0554    morphine (PF) injection 1 mg, 1 mg, Intravenous, Q2H PRN **OR** morphine (PF) injection 2 mg, 2 mg, Intravenous, Q2H PRN, Reinaldo Armstrong MD, 2 mg at 08/22/24 0835    naloxone (NARCAN) injection 0.2 mg, 0.2 mg, Intravenous, Q2 Min PRN **OR** naloxone (NARCAN) injection 0.4 mg, 0.4 mg, Intravenous, Q2 Min PRN  "**OR** naloxone (NARCAN) injection 0.2 mg, 0.2 mg, Intramuscular, Q2 Min PRN **OR** naloxone (NARCAN) injection 0.4 mg, 0.4 mg, Intramuscular, Q2 Min PRN, Jack Dumont MD    olopatadine (PATANOL) 0.1 % ophthalmic solution 1 drop, 1 drop, Both Eyes, BID, Consuelo Coelho PA-C, 1 drop at 08/23/24 0921    ondansetron (ZOFRAN ODT) ODT tab 4 mg, 4 mg, Oral, Q6H PRN **OR** ondansetron (ZOFRAN) injection 4 mg, 4 mg, Intravenous, Q6H PRN, Bishop PARISH Spence PA-C, 4 mg at 08/21/24 1949    pantoprazole (PROTONIX) EC tablet 40 mg, 40 mg, Oral, QAM AC, Consuelo Coelho PA-C, 40 mg at 08/23/24 0917    polyethylene glycol (MIRALAX) Packet 17 g, 17 g, Oral, Daily, Bishop PARISH Spence PA-C, 17 g at 08/23/24 0917    prochlorperazine (COMPAZINE) injection 10 mg, 10 mg, Intravenous, Q6H PRN, 10 mg at 08/21/24 2046 **OR** prochlorperazine (COMPAZINE) tablet 10 mg, 10 mg, Oral, Q6H PRN, Bishop PARISH Spence PA-C    scopolamine (TRANSDERM) 72 hr patch 1 patch, 1 patch, Transdermal, Q72H **AND** scopolamine (TRANSDERM-SCOP) Patch in Place, , Transdermal, Q8H, Reinaldo Armstrong MD    senna-docusate (SENOKOT-S/PERICOLACE) 8.6-50 MG per tablet 1 tablet, 1 tablet, Oral, BID, Bishop PARISH Spence PA-C, 1 tablet at 08/23/24 0916    sodium chloride (PF) 0.9% PF flush 3 mL, 3 mL, Intracatheter, Q8H, Bishop PARISH Spence PA-C    sodium chloride (PF) 0.9% PF flush 3 mL, 3 mL, Intracatheter, q1 min prn, Anyiwe, Valencia C, PA-C    Physical Exam:    /68 (BP Location: Right arm)   Pulse 76   Temp 99.5  F (37.5  C) (Oral)   Resp 18   Ht 1.727 m (5' 8\")   Wt 89.6 kg (197 lb 8.5 oz)   SpO2 96%   BMI 30.03 kg/m       GENERAL: Alert and oriented x 3. NAD.   HEENT: Anicteric sclera. Mucous membranes moist.   CV: RRR. S1, S2. No murmurs appreciated.   RESPIRATORY: Effort normal on RA. Lungs CTAB with no wheezing, rales, rhonchi.   GI: Abdomen soft and non distended with normoactive bowel sounds present in all quadrants. No tenderness, " rebound, guarding.   NEUROLOGICAL: No focal deficits. Moves all extremities.    EXTREMITIES: Hemovac drain from the Lumbar surgical incision site   No peripheral edema. Intact bilateral pedal pulses.   SKIN: No jaundice. No rashes.     Labs & Studies of Note: I personally reviewed the following studies:  CBC RESULTS:   Recent Labs   Lab Test 08/02/24  1155   WBC 5.3   RBC 4.33   HGB 13.5   HCT 40.0   MCV 92   MCH 31.2   MCHC 33.8   RDW 13.2        Last Comprehensive Metabolic Panel:  Lab Results   Component Value Date     08/22/2024    POTASSIUM 4.6 08/22/2024    CHLORIDE 101 08/22/2024    CO2 28 08/22/2024    ANIONGAP 8 08/22/2024     (H) 08/22/2024    BUN 11.4 08/22/2024    CR 0.95 08/22/2024    GFRESTIMATED 67 08/22/2024    ADILENE 9.4 08/22/2024

## 2024-08-23 NOTE — PROGRESS NOTES
"Orthopaedic Surgery Progress Note:       Subjective:   Patient overall doing well. Voiding spontaneously. Pain well controlled on current regimen.Cleared PT. No new radicular pain. + flatus. Accidentally pulled her drain out overnight       Objective:   /57   Pulse 80   Temp 98.7  F (37.1  C) (Oral)   Resp 17   Ht 1.727 m (5' 8\")   Wt 89.6 kg (197 lb 8.5 oz)   SpO2 95%   BMI 30.03 kg/m    I/O this shift:  In: -   Out: 20 [Drains:20]  Gen: NAD. Resting comfortably in bed  Resp: Breathing comfortably on RA  : Zuñiga in place    Dressing clean and dry  Drain removed        Lumbar Spine:    Appearance - No gross stepoffs or deformities    Motor -     L2-3: Hip flexion 5/5 R and 5/5 L strength          L3/4:  Knee extension R 5/5 and L 5/5 strength         L4/5:  Foot dorsiflexion R 5/5 L 5/5 and       EHL dorsiflexion R 5/5 L 5/5 strength         S1:  Plantarflexion/Peroneal Muscles  R 5/5 and L 5/5 strength    Sensation: intact to light touch L3-S1 distribution BLE        Labs:  Lab Results   Component Value Date    WBC 5.3 08/02/2024    HGB 12.1 08/22/2024     08/02/2024        Assessment & Plan:   Assessment and Plan: Zaira Naranjo is a 64 year old female s/p L5-S1 TLIF on 8/21 with Dr. Dumont.     Today  --progress with OT  --Xrays  --discharge home this afternoon     Dr. Dumont Primary  Activity:   - Up with assist until independent. No excessive bending or twisting. No lifting >10 lbs x 6 weeks.   Weight bearing status: WBAT.  Pain management:   - Transition from IV to PO narcotics as tolerated. No NSAIDs x 3 months.   - IV lidocaine: discontinue at 8am on POD#2 or earlier if complications arise.  Antibiotics: Ancef x 24 hours.  Diet: Begin with clear fluids and progress diet as tolerated.   DVT prophylaxis: SCDs only. No chemical DVT ppx needed.  Imaging: XR Upright Lumbar PTDC - ordered.  Labs: Hgb POD#1.  Bracing/Splinting: None.  Dressings: Keep Aquacel c/d/i x 7 days.  Drains: " Hemovac, Document output per shift, will be discontinued at Orthopedic Surgery discretion.  Zuñiga catheter: Remove POD#1.  Physical Therapy/Occupational Therapy: Eval and treat.  Cultures: none.    Consults: Hospitalist.  Follow-up: Clinic with Dr. Dumont in 6 weeks with repeat x-rays.   Disposition: Pending progress with therapies, pain control on orals, and medical stability, anticipate discharge to home on POD #2-4.    Alejo Saba, DO  Spine Fellow

## 2024-08-26 ENCOUNTER — TELEPHONE (OUTPATIENT)
Dept: ORTHOPEDICS | Facility: CLINIC | Age: 64
End: 2024-08-26
Payer: COMMERCIAL

## 2024-08-26 DIAGNOSIS — Z98.890 STATUS POST LUMBAR SPINE SURGERY FOR DECOMPRESSION OF SPINAL CORD: ICD-10-CM

## 2024-08-26 DIAGNOSIS — K59.03 DRUG-INDUCED CONSTIPATION: Primary | ICD-10-CM

## 2024-08-26 RX ORDER — AMOXICILLIN 250 MG
1 CAPSULE ORAL 2 TIMES DAILY
Qty: 20 TABLET | Refills: 0 | Status: SHIPPED | OUTPATIENT
Start: 2024-08-26 | End: 2024-09-18

## 2024-08-26 RX ORDER — ONDANSETRON 4 MG/1
4 TABLET, ORALLY DISINTEGRATING ORAL EVERY 8 HOURS PRN
Qty: 30 TABLET | Refills: 0 | Status: SHIPPED | OUTPATIENT
Start: 2024-08-26 | End: 2024-09-18

## 2024-08-26 RX ORDER — BISACODYL 10 MG
10 SUPPOSITORY, RECTAL RECTAL DAILY PRN
Qty: 5 SUPPOSITORY | Refills: 0 | Status: SHIPPED | OUTPATIENT
Start: 2024-08-26 | End: 2024-08-31

## 2024-08-26 NOTE — TELEPHONE ENCOUNTER
Other: Patient is calling to speak to care team regarding symptoms DOS was 8/21/24 no fever but not eating and no bowel movement since surgery and as of today around 9:00am patient has been vomiting about every 15-20mins.      Could we [facility] this information to you [email or web address] or would you prefer to receive a phone call?:   Patient would prefer a phone call   Okay to leave a detailed message?: Yes at Home number on file [phone] (home)

## 2024-08-26 NOTE — TELEPHONE ENCOUNTER
RN attempted to call patient to discuss symptoms. Patient's phone went to voicemail, unable to leave VM as mailbox is full. Will attempt to reach patient shortly.     Ayesha Van RN

## 2024-08-26 NOTE — TELEPHONE ENCOUNTER
Called and went over things. She notes a history of diverticulosis. No fevers or chills, bloody stool that would make me concern for diverticulitis or colitis. I educated most likely source is narcotic induced CNS depression vs illeus. Educated liquid diet. She has only been using mirilax sparingly.    Plan for suppository today. I will prescribe Zofran for nausea. Plan for daily milk of magnesia, and senna-docusate. I ordered an enema she can use if she doesn't have a bowel movement tonight. Educated if no return of bowel function tomorrow, or worsening nausea she should be evaluated at the ED.    She is in agreement with plan.  Bishop NEENA Spence PA-C

## 2024-08-27 DIAGNOSIS — Z98.890 STATUS POST LUMBAR SPINE SURGERY FOR DECOMPRESSION OF SPINAL CORD: ICD-10-CM

## 2024-08-27 NOTE — TELEPHONE ENCOUNTER
See phone message from pt this AM & see triage note from Bishop GUEVARA yesterday.    I called pt back late today who stated pain much better now.  Stated was finally sleeping after taking half tab of MS so 7.5mg so Right leg & back pain went from rated 9 to 6-7.    Stated doesn't like the MS so was hesitant to take it but finally did & it helped pain.    Has #21 MS left before weekend.  Stated Nausea & Vomiting is finally controlled after Zofran that  ordered yesterday & had 2 BMs today after taking the meds  ordered & did not need an Enema.     & Daughter are helping.    Taking Tylenol 3 tabs Q8H as ordered.    Only taking Flexeril & Robaxin BID so I advised increasing to TID& QID as ordered & pt agreed.  Has enough stool softeners.    Taking fluids well & trying foods & using ice.    Refilled MS 15mg before weekend, Tylenol, Flexeril & Robaxin.  Call back prn. Pt agreed.    S.O./Lolly Bruce RN.

## 2024-08-27 NOTE — TELEPHONE ENCOUNTER
"[facility] Call Center    Phone Message    May a detailed message be left on voicemail: yes     Reason for Call: Other: Patient stated that she got over the nausea and got her stomach \"going\" however she is still in pain. Pain level rated an 8-10/10. Not sure if she should take morphine again for pain because she can not rest when taking it. Should she just take tylenol? She did take 3 tylenol this morning but does not want to keep doing that. Patient is requesting a call back.     Action Taken: Message routed to:  [facility] Orthopedics    Travel Screening: Not Applicable     Date of Service:                                                                     "

## 2024-08-28 RX ORDER — MORPHINE SULFATE 15 MG/1
7.5 TABLET ORAL EVERY 4 HOURS PRN
Qty: 20 TABLET | Refills: 0 | Status: SHIPPED | OUTPATIENT
Start: 2024-08-30 | End: 2024-09-10

## 2024-08-28 RX ORDER — ACETAMINOPHEN 325 MG/1
975 TABLET ORAL EVERY 8 HOURS
Qty: 100 TABLET | Refills: 1 | Status: SHIPPED | OUTPATIENT
Start: 2024-08-28

## 2024-08-28 RX ORDER — CYCLOBENZAPRINE HCL 10 MG
10 TABLET ORAL EVERY 8 HOURS PRN
Qty: 30 TABLET | Refills: 2 | Status: SHIPPED | OUTPATIENT
Start: 2024-08-28

## 2024-08-28 RX ORDER — METHOCARBAMOL 750 MG/1
750 TABLET, FILM COATED ORAL EVERY 6 HOURS PRN
Qty: 35 TABLET | Refills: 2 | Status: SHIPPED | OUTPATIENT
Start: 2024-08-28

## 2024-09-04 ENCOUNTER — MYC MEDICAL ADVICE (OUTPATIENT)
Dept: ORTHOPEDICS | Facility: CLINIC | Age: 64
End: 2024-09-04
Payer: COMMERCIAL

## 2024-09-05 NOTE — TELEPHONE ENCOUNTER
See My Chart message last night late 9-4-24 & pictures of rashes sent this am 9-5-24 as requested by triage.  DOS 4-.  I called & sp[annalise to pt who stated rashes are not near incision & incision is healing well with no signs of infection. A feb. No drainage or redness.  Pt stated feels the rash is caused by the Morphine & pain is controlled so is decreasing the MS to wean off.  Stated took a Zyrtec pill this am  & is using Eczema oatmeal Lotion which is helping the rash & itching.  I advised can use OTC Bendaryl or Hydrocodone cream prn & pt agreed.  Call back ;prn. Lolly Bruce RN.

## 2024-09-08 ENCOUNTER — MYC MEDICAL ADVICE (OUTPATIENT)
Dept: FAMILY MEDICINE | Facility: OTHER | Age: 64
End: 2024-09-08

## 2024-09-08 ENCOUNTER — MYC MEDICAL ADVICE (OUTPATIENT)
Dept: ORTHOPEDICS | Facility: CLINIC | Age: 64
End: 2024-09-08
Payer: COMMERCIAL

## 2024-09-10 ENCOUNTER — VIRTUAL VISIT (OUTPATIENT)
Dept: FAMILY MEDICINE | Facility: OTHER | Age: 64
End: 2024-09-10
Attending: NURSE PRACTITIONER
Payer: COMMERCIAL

## 2024-09-10 DIAGNOSIS — G89.29 CHRONIC BILATERAL LOW BACK PAIN WITH LEFT-SIDED SCIATICA: ICD-10-CM

## 2024-09-10 DIAGNOSIS — M54.42 CHRONIC BILATERAL LOW BACK PAIN WITH LEFT-SIDED SCIATICA: ICD-10-CM

## 2024-09-10 DIAGNOSIS — Z98.890 STATUS POST LUMBAR SPINE SURGERY FOR DECOMPRESSION OF SPINAL CORD: ICD-10-CM

## 2024-09-10 DIAGNOSIS — M1A.09X0 IDIOPATHIC CHRONIC GOUT OF MULTIPLE SITES WITHOUT TOPHUS: Primary | ICD-10-CM

## 2024-09-10 PROCEDURE — 99441 PR PHYSICIAN TELEPHONE EVALUATION 5-10 MIN: CPT | Mod: 93 | Performed by: NURSE PRACTITIONER

## 2024-09-10 RX ORDER — PREDNISONE 20 MG/1
20 TABLET ORAL 2 TIMES DAILY
Qty: 10 TABLET | Refills: 0 | Status: SHIPPED | OUTPATIENT
Start: 2024-09-10 | End: 2024-09-15

## 2024-09-10 RX ORDER — MORPHINE SULFATE 15 MG/1
7.5 TABLET ORAL EVERY 6 HOURS PRN
Qty: 20 TABLET | Refills: 0 | Status: SHIPPED | OUTPATIENT
Start: 2024-09-10 | End: 2024-09-18

## 2024-09-10 NOTE — PATIENT INSTRUCTIONS
Continue to follow up with surgeon for back pain - which is improving, but slowed due to gout flair up     Prednisone as directed

## 2024-09-10 NOTE — TELEPHONE ENCOUNTER
See phone message from pt today 9-10-24 & see My Chart message from pt on Sun 9-8-`24.  DOS 8-21-24.    I called pt back who stated Primary ordered Prednisone for Gout flare but having trouble walking due to pain in foot so back pain has increased rated 5.  Needs refill of MS last refilled #20 tabs on 8-30-24.  Taking Tylenol, Flexeril & Robaxin & has refills of all.    Having Bms & has enough stool softeners.    Refilled MS & decreased Sig from 4H to 6H prn & pt knows to only take it for severe pain & continue to wean down.  RTC POP appt. 10-3-24.  Call back prn. Pt agreed.    S.O./Lolly Bruce RN.

## 2024-09-10 NOTE — TELEPHONE ENCOUNTER
Other: Sunday morning patient woke up with a flare up of gout in right toe. She was prescribed prednisone from PCP. She can't walk at this time so thinking this might hinder recovery after surgery. If possible she would like to get a one week refill for Morphine Sulfate 15 MG Oral Tablet (MSIR).      Could we send this information to you in [employer] or would you prefer to receive a phone call?:   No preference   Okay to leave a detailed message?: Yes at Cell number on file:    Telephone Information:   Mobile [phone]

## 2024-09-10 NOTE — PROGRESS NOTES
"Zaira is a 64 year old who is being evaluated via a billable telephone visit.    What phone number would you like to be contacted at? [phone]  How would you like to obtain your AVS? Kimmie  Originating Location (pt. Location): Home    Distant Location (provider location):  On-site    Assessment & Plan     Idiopathic chronic gout of multiple sites without tophus  Gout flair - unable to use ibuprofen due to recent back surgery  Trial of pure cherry juice without any improvement. Pain continues to worsen.    Discussed prednisone use   - predniSONE (DELTASONE) 20 MG tablet; Take 1 tablet (20 mg) by mouth 2 times daily for 5 days.    Chronic bilateral low back pain with left-sided sciatica  Follow up with surgeon for surgical pain medication refill           BMI  Estimated body mass index is 30.03 kg/m  as calculated from the following:    Height as of 8/21/24: 1.727 m (5' 8\").    Weight as of 8/21/24: 89.6 kg (197 lb 8.5 oz).         See Patient Instructions    No follow-ups on file.    Subjective   Zaira is a 64 year old, presenting for the following health issues:  Arthritis (Gout )        9/10/2024     9:46 AM   Additional Questions   Roomed by Nehemiah Brown LPN   Accompanied by Self         9/10/2024     9:46 AM   Patient Reported Additional Medications   Patient reports taking the following new medications Cyclobenzaprine, Morphine     HPI     Pain History:  When did you first notice your pain? 09/08/2024   Have you seen anyone else for your pain? No  How has your pain affected your ability to work? On medical leave following surgery  Where in your body do you have pain?  Right foot and great toe  Had back surgery on 8/21/24 doing well  Now a flair up of her gout in her great toe.            Review of Systems  CONSTITUTIONAL: NEGATIVE for fever, chills, change in weight  INTEGUMENTARY/SKIN: right great toe redness and swelling   RESP: NEGATIVE for significant cough or SOB  CV: NEGATIVE for chest pain, " palpitations or peripheral edema  MUSCULOSKELETAL: pain right great toe  NEURO: difficult to walk due to pain in toe       Objective    Vitals - Patient Reported  Pain Score: Extreme Pain (9)  Pain Loc: Foot (Right great toe and top of foot)      Vitals:  No vitals were obtained today due to virtual visit.    Physical Exam   General: Alert and no distress //Respiratory: No audible wheeze, cough, or shortness of breath // Psychiatric:  Appropriate affect, tone, and pace of words      Reviewed labs in EPIC       Phone call duration: 5 minutes  Signed Electronically by: TESFAYE Dietz CNP

## 2024-09-16 DIAGNOSIS — K21.9 GASTROESOPHAGEAL REFLUX DISEASE WITHOUT ESOPHAGITIS: ICD-10-CM

## 2024-09-16 DIAGNOSIS — E78.2 MIXED HYPERLIPIDEMIA: ICD-10-CM

## 2024-09-16 DIAGNOSIS — I10 HYPERTENSION, UNSPECIFIED TYPE: ICD-10-CM

## 2024-09-16 DIAGNOSIS — E78.00 PURE HYPERCHOLESTEROLEMIA: ICD-10-CM

## 2024-09-16 DIAGNOSIS — H10.13 ALLERGIC CONJUNCTIVITIS, BILATERAL: ICD-10-CM

## 2024-09-16 RX ORDER — FLUTICASONE PROPIONATE 50 MCG
SPRAY, SUSPENSION (ML) NASAL
Qty: 16 G | Refills: 0 | Status: SHIPPED | OUTPATIENT
Start: 2024-09-16

## 2024-09-16 RX ORDER — AMLODIPINE BESYLATE 10 MG/1
10 TABLET ORAL DAILY
Qty: 90 TABLET | Refills: 2 | Status: SHIPPED | OUTPATIENT
Start: 2024-09-16

## 2024-09-16 RX ORDER — CHLORTHALIDONE 25 MG/1
TABLET ORAL
Qty: 45 TABLET | Refills: 0 | Status: SHIPPED | OUTPATIENT
Start: 2024-09-16

## 2024-09-16 RX ORDER — LISINOPRIL 40 MG/1
40 TABLET ORAL DAILY
Qty: 90 TABLET | Refills: 2 | Status: SHIPPED | OUTPATIENT
Start: 2024-09-16 | End: 2024-09-18

## 2024-09-16 RX ORDER — ATORVASTATIN CALCIUM 20 MG/1
20 TABLET, FILM COATED ORAL AT BEDTIME
Qty: 90 TABLET | Refills: 0 | Status: SHIPPED | OUTPATIENT
Start: 2024-09-16

## 2024-09-16 RX ORDER — METOPROLOL SUCCINATE 100 MG/1
TABLET, EXTENDED RELEASE ORAL
Qty: 90 TABLET | Refills: 2 | Status: SHIPPED | OUTPATIENT
Start: 2024-09-16

## 2024-09-16 NOTE — TELEPHONE ENCOUNTER
Amitriptyline 25 mg       Last Written Prescription Date:  11/21/23  Last Fill Quantity: 90,   # refills: 2  Last Office Visit: 9/10/24  Future Office visit:    Next 5 appointments (look out 90 days)      Sep 18, 2024 2:00 PM  (Arrive by 1:45 PM)  Adult Preventative Visit with TESFAYE Amos CNP  [facility] [place] ([facility] [place] ) [address]  [place] [address]  [phone]             Routing refill request to provider for review/approval because:  Tricyclic Agents ( Annual appt and no PHQ9) Failed      Medicaiton indicated for associated diagnosis    Medication is associated with one or more of the following diagnoses:               Alcoholism              Anxiety              Attention deficit hyperactivity disorder              Depression              Fibromyalgia              Headache                      Insomnia                       Neurogenic bladder              Neuropathy              Nocturnal enuresis              Pain              Panic Disorder              Smoking cessation              Tinnitus      Lipitor 20 mg       Last Written Prescription Date:  4/2/24  Last Fill Quantity: 90,   # refills: 1  Last Office Visit: 9/10/24  Future Office visit:    Next 5 appointments (look out 90 days)      Sep 18, 2024 2:00 PM  (Arrive by 1:45 PM)  Adult Preventative Visit with TESFAYE Amos CNP  [facility] [place] ([facility] [place] ) [address]  [place] [address]  [phone]             Routing refill request to provider for review/approval because:  Antihyperlipidemic agents Failed      LDL on file in the past 12 months

## 2024-09-18 ENCOUNTER — OFFICE VISIT (OUTPATIENT)
Dept: FAMILY MEDICINE | Facility: OTHER | Age: 64
End: 2024-09-18
Attending: NURSE PRACTITIONER
Payer: COMMERCIAL

## 2024-09-18 VITALS
HEART RATE: 90 BPM | BODY MASS INDEX: 27.77 KG/M2 | OXYGEN SATURATION: 98 % | DIASTOLIC BLOOD PRESSURE: 70 MMHG | SYSTOLIC BLOOD PRESSURE: 116 MMHG | WEIGHT: 187.5 LBS | HEIGHT: 69 IN | TEMPERATURE: 97.9 F

## 2024-09-18 DIAGNOSIS — M47.27 LUMBOSACRAL SPONDYLOSIS WITH RADICULOPATHY: Primary | ICD-10-CM

## 2024-09-18 DIAGNOSIS — F51.01 PRIMARY INSOMNIA: ICD-10-CM

## 2024-09-18 DIAGNOSIS — Z11.51 SPECIAL SCREENING EXAMINATION FOR HUMAN PAPILLOMAVIRUS (HPV): ICD-10-CM

## 2024-09-18 DIAGNOSIS — E78.2 MIXED HYPERLIPIDEMIA: ICD-10-CM

## 2024-09-18 DIAGNOSIS — Z12.31 ENCOUNTER FOR SCREENING MAMMOGRAM FOR BREAST CANCER: ICD-10-CM

## 2024-09-18 DIAGNOSIS — K21.9 GASTROESOPHAGEAL REFLUX DISEASE WITHOUT ESOPHAGITIS: ICD-10-CM

## 2024-09-18 DIAGNOSIS — Z00.00 ROUTINE GENERAL MEDICAL EXAMINATION AT A HEALTH CARE FACILITY: Primary | ICD-10-CM

## 2024-09-18 DIAGNOSIS — M1A.09X0 IDIOPATHIC CHRONIC GOUT OF MULTIPLE SITES WITHOUT TOPHUS: ICD-10-CM

## 2024-09-18 DIAGNOSIS — I10 ESSENTIAL HYPERTENSION: ICD-10-CM

## 2024-09-18 LAB
ALBUMIN SERPL BCG-MCNC: 4.4 G/DL (ref 3.5–5.2)
ALP SERPL-CCNC: 106 U/L (ref 40–150)
ALT SERPL W P-5'-P-CCNC: 51 U/L (ref 0–50)
ANION GAP SERPL CALCULATED.3IONS-SCNC: 17 MMOL/L (ref 7–15)
AST SERPL W P-5'-P-CCNC: 36 U/L (ref 0–45)
BILIRUB SERPL-MCNC: 0.4 MG/DL
BUN SERPL-MCNC: 20.8 MG/DL (ref 8–23)
CALCIUM SERPL-MCNC: 9.7 MG/DL (ref 8.8–10.4)
CHLORIDE SERPL-SCNC: 94 MMOL/L (ref 98–107)
CHOLEST SERPL-MCNC: 136 MG/DL
CREAT SERPL-MCNC: 0.92 MG/DL (ref 0.51–0.95)
EGFRCR SERPLBLD CKD-EPI 2021: 69 ML/MIN/1.73M2
FASTING STATUS PATIENT QL REPORTED: NO
FASTING STATUS PATIENT QL REPORTED: NO
GLUCOSE SERPL-MCNC: 106 MG/DL (ref 70–99)
HCO3 SERPL-SCNC: 23 MMOL/L (ref 22–29)
HDLC SERPL-MCNC: 41 MG/DL
LDLC SERPL CALC-MCNC: 69 MG/DL
NONHDLC SERPL-MCNC: 95 MG/DL
POTASSIUM SERPL-SCNC: 3.2 MMOL/L (ref 3.4–5.3)
PROT SERPL-MCNC: 8 G/DL (ref 6.4–8.3)
SODIUM SERPL-SCNC: 134 MMOL/L (ref 135–145)
TRIGL SERPL-MCNC: 129 MG/DL
URATE SERPL-MCNC: 4.5 MG/DL (ref 2.4–5.7)

## 2024-09-18 PROCEDURE — G0123 SCREEN CERV/VAG THIN LAYER: HCPCS | Performed by: NURSE PRACTITIONER

## 2024-09-18 PROCEDURE — 80061 LIPID PANEL: CPT | Performed by: NURSE PRACTITIONER

## 2024-09-18 PROCEDURE — 80053 COMPREHEN METABOLIC PANEL: CPT | Performed by: NURSE PRACTITIONER

## 2024-09-18 PROCEDURE — 84550 ASSAY OF BLOOD/URIC ACID: CPT | Performed by: NURSE PRACTITIONER

## 2024-09-18 PROCEDURE — 99214 OFFICE O/P EST MOD 30 MIN: CPT | Mod: 25 | Performed by: NURSE PRACTITIONER

## 2024-09-18 PROCEDURE — 36415 COLL VENOUS BLD VENIPUNCTURE: CPT | Performed by: NURSE PRACTITIONER

## 2024-09-18 PROCEDURE — 87624 HPV HI-RISK TYP POOLED RSLT: CPT | Performed by: NURSE PRACTITIONER

## 2024-09-18 PROCEDURE — 99396 PREV VISIT EST AGE 40-64: CPT | Performed by: NURSE PRACTITIONER

## 2024-09-18 RX ORDER — HYDROXYZINE HYDROCHLORIDE 25 MG/1
25 TABLET, FILM COATED ORAL 3 TIMES DAILY PRN
Qty: 30 TABLET | Refills: 4 | Status: SHIPPED | OUTPATIENT
Start: 2024-09-18

## 2024-09-18 RX ORDER — PREDNISONE 10 MG/1
TABLET ORAL
Qty: 20 TABLET | Refills: 0 | Status: SHIPPED | OUTPATIENT
Start: 2024-09-18

## 2024-09-18 ASSESSMENT — PAIN SCALES - GENERAL: PAINLEVEL: MODERATE PAIN (5)

## 2024-09-18 NOTE — PROGRESS NOTES
Preventive Care Visit  [facility]  TESFAYE Dietz CNP, Family Medicine  Sep 18, 2024      Assessment & Plan     Routine general medical examination at a health care facility  Continue yearly physicals     Mixed hyperlipidemia  HDL slightly low - increase fiber intake and continue statin  Tolerating statin therapy  Continue with Lipitor 20 mg daily   - Lipid Profile (Chol, Trig, HDL, LDL calc); Future  - Lipid Profile (Chol, Trig, HDL, LDL calc)    Essential hypertension  Blood pressure is well controlled   Sodium and potassium are both slightly low encouraged to increase in her diet - possible consider decreasing chlorthalidone   Continue with amlodipine, chlorthalidone, metoprolol    - Comprehensive metabolic panel (BMP + Alb, Alk Phos, ALT, AST, Total. Bili, TP); Future  - Comprehensive metabolic panel (BMP + Alb, Alk Phos, ALT, AST, Total. Bili, TP)    Gastroesophageal reflux disease without esophagitis  Symptom control with omeprazole   Continue with current treatment     Idiopathic chronic gout of multiple sites without tophus  Recurrent pain and swelling in right great toe - uric acid controlled with allopurinol   Ok to try prednisone taper for continued right great toe pain   - Uric acid; Future  - predniSONE (DELTASONE) 10 MG tablet; Take 3 tabs by mouth daily x 3 days, then 2 tabs daily x 3 days, then 1 tab daily x 3 days, then 1/2 tab daily x 3 days.  - Uric acid    Special screening examination for human papillomavirus (HPV)  - HPV and Gynecologic Cytology Panel (Ages 30-65)    Primary insomnia  Refilled  Continues to use as needed   - hydrOXYzine HCl (ATARAX) 25 MG tablet; Take 1 tablet (25 mg) by mouth 3 times daily as needed for anxiety.    Encounter for screening mammogram for breast cancer  - MA Screen Bilateral w/Ulises; Future    Patient has been advised of split billing requirements and indicates understanding: Yes        BMI  Estimated body mass index is 28.09 kg/m  as  "calculated from the following:    Height as of this encounter: 1.74 m (5' 8.5\").    Weight as of this encounter: 85 kg (187 lb 8 oz).   Weight management plan: Discussed healthy diet and exercise guidelines    Counseling  Appropriate preventive services were addressed with this patient via screening, questionnaire, or discussion as appropriate for fall prevention, nutrition, physical activity, Tobacco-use cessation, social engagement, weight loss and cognition.  Checklist reviewing preventive services available has been given to the patient.  Reviewed patient's diet, addressing concerns and/or questions.   She is at risk for lack of exercise and has been provided with information to increase physical activity for the benefit of her well-being.   She is at risk for psychosocial distress and has been provided with information to reduce risk.       See Patient Instructions    Return in about 53 weeks (around 9/24/2025) for Annual Wellness Visit.    Saritha Meneses is a 64 year old, presenting for the following:  Physical  Needs all medications renewed- reviewed medication        9/18/2024     1:49 PM   Additional Questions   Roomed by Becca Padron LPN   Accompanied by self        Health Care Directive  Patient does not have a Health Care Directive or Living Will: Discussed advance care planning with patient; however, patient declined at this time.    HPI    Hyperlipidemia Follow-Up    Are you regularly taking any medication or supplement to lower your cholesterol?   Yes- Lipitor 20 mg   Are you having muscle aches or other side effects that you think could be caused by your cholesterol lowering medication?  No    Hypertension Follow-up    Do you check your blood pressure regularly outside of the clinic? Yes   Are you following a low salt diet? Yes  Are your blood pressures ever more than 140 on the top number (systolic) OR more   than 90 on the bottom number (diastolic), for example 140/90? No    Anxiety   How " are you doing with your anxiety since your last visit? Improved Better   Are you having other symptoms that might be associated with anxiety? No  Have you had a significant life event? OTHER: changing jobs, major surgery, thinking of moving     Are you feeling depressed? No  Do you have any concerns with your use of alcohol or other drugs? No    Concern - gout flare   Onset: 2 weeks ago   Description:   Had a flare after surgery was put on prednisone for 5 days- thinks it may have helped but still hurting and keeps her up at night   Intensity: moderate  Progression of Symptoms:  same  Accompanying Signs & Symptoms:  None   Previous history of similar problem:   Yes - one other time   Precipitating factors:   Worsened by: walking   Alleviating factors:  Improved by: putting foot up     Therapies Tried and outcome: prednisone    Post surgical flair due to stress     Social History     Tobacco Use    Smoking status: Former     Current packs/day: 0.00     Types: Cigarettes     Quit date: 1995     Years since quittin.7     Passive exposure: Never    Smokeless tobacco: Never    Tobacco comments:     Quit     Vaping Use    Vaping status: Never Used   Substance Use Topics    Alcohol use: Yes     Comment: 2 beers a week    Drug use: Yes     Types: Marijuana     Comment: THC daily for sleep             2024   General Health   How would you rate your overall physical health? Good   Feel stress (tense, anxious, or unable to sleep) Only a little      (!) STRESS CONCERN      2024   Nutrition   Three or more servings of calcium each day? Yes   Diet: Low salt   How many servings of fruit and vegetables per day? (!) 2-3   How many sweetened beverages each day? 0-1            2024   Exercise   Days per week of moderate/strenous exercise 3 days   Average minutes spent exercising at this level 50 min            2024   Social Factors   Frequency of gathering with friends or relatives Once a week   Worry  food won't last until get money to buy more No   Food not last or not have enough money for food? No   Do you have housing? (Housing is defined as stable permanent housing and does not include staying ouside in a car, in a tent, in an abandoned building, in an overnight shelter, or couch-surfing.) Yes   Are you worried about losing your housing? No   Lack of transportation? No   Unable to get utilities (heat,electricity)? No            2024   Fall Risk   Fallen 2 or more times in the past year? No   Trouble with walking or balance? No             2024   Dental   Dentist two times every year? Yes            2024   TB Screening   Were you born outside of the [place]? No              Today's PHQ-2 Score:       2024     2:37 PM   PHQ-2 (  [employer])   Q1: Little interest or pleasure in doing things 0   Q2: Feeling down, depressed or hopeless 0   PHQ-2 Score 0         2024   Substance Use   Alcohol more than 3/day or more than 7/wk No   Do you use any other substances recreationally? (!) CANNABIS PRODUCTS        Social History     Tobacco Use    Smoking status: Former     Current packs/day: 0.00     Types: Cigarettes     Quit date: 1995     Years since quittin.7     Passive exposure: Never    Smokeless tobacco: Never    Tobacco comments:     Quit     Vaping Use    Vaping status: Never Used   Substance Use Topics    Alcohol use: Yes     Comment: 2 beers a week    Drug use: Yes     Types: Marijuana     Comment: THC daily for sleep           2022   LAST [facility]-7 RESULTS   1st degree relative breast or ovarian cancer No           Mammogram Screening - Mammogram every 1-2 years updated in Health Maintenance based on mutual decision making        2024   STI Screening   New sexual partner(s) since last STI/HIV test? No        History of abnormal Pap smear: No - age 30- 64 PAP with HPV every 5 years recommended       ASCVD Risk   The 10-year ASCVD risk score (Kyle WINTER, et al.,  2019) is: 5.3%    Values used to calculate the score:      Age: 64 years      Sex: Female      Is Non- : No      Diabetic: No      Tobacco smoker: No      Systolic Blood Pressure: 116 mmHg      Is BP treated: Yes      HDL Cholesterol: 52 mg/dL      Total Cholesterol: 175 mg/dL           Reviewed and updated as needed this visit by Provider                    Lab work is in process  BP Readings from Last 3 Encounters:   09/18/24 116/70   08/23/24 126/68   08/02/24 113/76    Wt Readings from Last 3 Encounters:   09/18/24 85 kg (187 lb 8 oz)   08/21/24 89.6 kg (197 lb 8.5 oz)   08/02/24 92.5 kg (204 lb)                  Patient Active Problem List   Diagnosis    Amplified musculoskeletal pain, localized    Anxiety    Cervical radiculopathy    Contact dermatitis and other eczema, due to unspecified cause    Diffuse cystic mastopathy    Diverticulosis of colon    Essential hypertension    Gastroesophageal reflux disease    Generalized hyperhidrosis    Gout, unspecified cause, unspecified chronicity, unspecified site    Hemangioma of skin and subcutaneous tissue    Hyperlipidemia    Insomnia, unspecified    Irritable bowel syndrome    Mediastinal lymphadenopathy due to sarcoidosis    Nonrheumatic aortic valve insufficiency    Obesity, Class II, BMI 35-39.9    Pulmonary sarcoidosis (H24)    Pure hypercholesterolemia    DDD (degenerative disc disease), lumbar    Status post lumbar spine surgery for decompression of spinal cord     Past Surgical History:   Procedure Laterality Date    EXCIS BARTHOLIN GLAND/CYST      fallopian tube reconstruction      x2    LAPAROSCOPY      OPTICAL TRACKING SYSTEM FUSION POSTERIOR SPINE LUMBAR N/A 8/21/2024    Procedure: Transforaminal Lumbar Interbody Fusion with De La Cruz-Miller Osteotomy Lumbar 5-Sacral 1; Use of Infuse Bone Morphogenetic Protein and Allograft;  Surgeon: Jack Dumont MD;  Location: [facility] OR    SPINE SURGERY  2000    lumbar discectomy     SPINE SURGERY  2006    cervical fusion       Social History     Tobacco Use    Smoking status: Former     Current packs/day: 0.00     Types: Cigarettes     Quit date: 1995     Years since quittin.7     Passive exposure: Never    Smokeless tobacco: Never    Tobacco comments:     Quit     Substance Use Topics    Alcohol use: Yes     Comment: 2 beers a week     Family History   Problem Relation Age of Onset    No Known Problems Mother     Lung Cancer Father     Anesthesia Reaction No family hx of     Thrombosis No family hx of          Current Outpatient Medications   Medication Sig Dispense Refill    acetaminophen (TYLENOL) 325 MG tablet Take 3 tablets (975 mg) by mouth every 8 hours. 100 tablet 1    allopurinol (ZYLOPRIM) 100 MG tablet Take 2 tablets (200 mg) by mouth daily 180 tablet 3    amitriptyline (ELAVIL) 25 MG tablet TAKE 1 TABLET BY MOUTH DAILY AT BEDTIME 90 tablet 0    amLODIPine (NORVASC) 10 MG tablet TAKE 1 TABLET BY MOUTH DAILY 90 tablet 2    atorvastatin (LIPITOR) 20 MG tablet TAKE 1 TABLET BY MOUTH DAILY AT BEDTIME 90 tablet 0    cetirizine HCl (ZYRTEC ALLERGY) 10 MG CAPS Take 1 tablet by mouth daily      chlorthalidone (HYGROTON) 25 MG tablet TAKE 1/2 TABLET (12.5MG) BY MOUTH DAILY 45 tablet 0    cyclobenzaprine (FLEXERIL) 10 MG tablet Take 1 tablet (10 mg) by mouth every 8 hours as needed for muscle spasms (do not take within 6 hours of robaxin). 30 tablet 2    fluticasone (FLONASE) 50 MCG/ACT nasal spray SPRAY 2 SPRAYS IN NOSTRIL(S) DAILY 16 g 0    hydrOXYzine HCl (ATARAX) 25 MG tablet TAKE 1 TABLET BY MOUTH 3 TIMES DAILY AS NEEDED FOR ANXIETY 30 tablet 4    methocarbamol (ROBAXIN) 750 MG tablet Take 1 tablet (750 mg) by mouth every 6 hours as needed for muscle spasms (do not take within 6 hours of flexeril). 35 tablet 2    metoprolol succinate ER (TOPROL XL) 100 MG 24 hr tablet TAKE 1 TABLET BY MOUTH DAILY. DO NOT CRUSH OR CHEW 90 tablet 2    olopatadine (PATANOL) 0.1 % ophthalmic  "solution Place 1 drop into both eyes 2 times daily 5 mL 1    omeprazole (PRILOSEC) 20 MG DR capsule TAKE 1 CAPSULE BY MOUTHDAILY BEFORE A MEAL. DO NOT CRUSH. 90 capsule 2     Allergies   Allergen Reactions    Cats     Other Environmental Allergy     Seasonal Allergies     Thimerosal (Thiomersal)     Hydromorphone Nausea    Ketorolac Tromethamine Nausea         Review of Systems  CONSTITUTIONAL: NEGATIVE for fever, chills, change in weight  INTEGUMENTARY/SKIN: had a reaction to morphine   EYES: NEGATIVE for vision changes or irritation  ENT/MOUTH: NEGATIVE for ear, mouth and throat problems  RESP: NEGATIVE for significant cough or SOB  CV: NEGATIVE for chest pain, palpitations or peripheral edema  GI: NEGATIVE for nausea, abdominal pain, heartburn, or change in bowel habits  : denies dysuria   MUSCULOSKELETAL: gout right great toe and back pain   NEURO: radiating pain to buttocks   ENDOCRINE: NEGATIVE for temperature intolerance, skin/hair changes  PSYCHIATRIC: NEGATIVE for changes in mood or affect     Objective    Exam  /70 (BP Location: Left arm, Patient Position: Sitting, Cuff Size: Adult Regular)   Pulse 90   Temp 97.9  F (36.6  C) (Tympanic)   Ht 1.74 m (5' 8.5\")   Wt 85 kg (187 lb 8 oz)   SpO2 98%   BMI 28.09 kg/m     Estimated body mass index is 28.09 kg/m  as calculated from the following:    Height as of this encounter: 1.74 m (5' 8.5\").    Weight as of this encounter: 85 kg (187 lb 8 oz).    Physical Exam  GENERAL: alert and no distress  EYES: Eyes grossly normal to inspection, PERRL and conjunctivae and sclerae normal  HENT: ear canals and TM's normal, nose and mouth without ulcers or lesions  NECK: no adenopathy, no asymmetry, masses, or scars  RESP: lungs clear to auscultation - no rales, rhonchi or wheezes  CV: regular rate and rhythm, normal S1 S2, no S3 or S4, no murmur, click or rub, no peripheral edema  ABDOMEN: soft, nontender, no hepatosplenomegaly, no masses and bowel sounds " normal   (female): normal female external genitalia, normal urethral meatus, normal vaginal mucosa  MS: tenderness to palpation lower back, swelling right great toe and tender to touch   SKIN: healing incision to lower back   NEURO: sensory exam grossly normal, mentation intact, and slow guarded movement   PSYCH: mentation appears normal, affect normal/bright  LYMPH: no cervical, supraclavicular, axillary, or inguinal adenopathy        Signed Electronically by: TESFAYE Dietz CNP

## 2024-09-18 NOTE — PATIENT INSTRUCTIONS
Patient Education   Preventive Care Advice   This is general advice given by our system to help you stay healthy. However, your care team may have specific advice just for you. Please talk to your care team about your preventive care needs.  Nutrition  Eat 5 or more servings of fruits and vegetables each day.  Try wheat bread, brown rice and whole grain pasta (instead of white bread, rice, and pasta).  Get enough calcium and vitamin D. Check the label on foods and aim for 100% of the RDA (recommended daily allowance).  Lifestyle  Exercise at least 150 minutes each week  (30 minutes a day, 5 days a week).  Do muscle strengthening activities 2 days a week. These help control your weight and prevent disease.  No smoking.  Wear sunscreen to prevent skin cancer.  Have a dental exam and cleaning every 6 months.  Yearly exams  See your health care team every year to talk about:  Any changes in your health.  Any medicines your care team has prescribed.  Preventive care, family planning, and ways to prevent chronic diseases.  Shots (vaccines)   HPV shots (up to age 26), if you've never had them before.  Hepatitis B shots (up to age 59), if you've never had them before.  COVID-19 shot: Get this shot when it's due.  Flu shot: Get a flu shot every year.  Tetanus shot: Get a tetanus shot every 10 years.  Pneumococcal, hepatitis A, and RSV shots: Ask your care team if you need these based on your risk.  Shingles shot (for age 50 and up)  General health tests  Diabetes screening:  Starting at age 35, Get screened for diabetes at least every 3 years.  If you are younger than age 35, ask your care team if you should be screened for diabetes.  Cholesterol test: At age 39, start having a cholesterol test every 5 years, or more often if advised.  Bone density scan (DEXA): At age 50, ask your care team if you should have this scan for osteoporosis (brittle bones).  Hepatitis C: Get tested at least once in your life.  STIs (sexually  transmitted infections)  Before age 24: Ask your care team if you should be screened for STIs.  After age 24: Get screened for STIs if you're at risk. You are at risk for STIs (including HIV) if:  You are sexually active with more than one person.  You don't use condoms every time.  You or a partner was diagnosed with a sexually transmitted infection.  If you are at risk for HIV, ask about PrEP medicine to prevent HIV.  Get tested for HIV at least once in your life, whether you are at risk for HIV or not.  Cancer screening tests  Cervical cancer screening: If you have a cervix, begin getting regular cervical cancer screening tests starting at age 21.  Breast cancer scan (mammogram): If you've ever had breasts, begin having regular mammograms starting at age 40. This is a scan to check for breast cancer.  Colon cancer screening: It is important to start screening for colon cancer at age 45.  Have a colonoscopy test every 10 years (or more often if you're at risk) Or, ask your provider about stool tests like a FIT test every year or Cologuard test every 3 years.  To learn more about your testing options, visit:   .  For help making a decision, visit:   https://bit.ly/hr28434.  Prostate cancer screening test: If you have a prostate, ask your care team if a prostate cancer screening test (PSA) at age 55 is right for you.  Lung cancer screening: If you are a current or former smoker ages 50 to 80, ask your care team if ongoing lung cancer screenings are right for you.  For informational purposes only. Not to replace the advice of your health care provider. Copyright   2023 [facility]. All rights reserved. Clinically reviewed by the [facility] Transitions Program. [employer] 845244 - REV 01/24.  Substance Use Disorder: Care Instructions  Overview     You can improve your life and health by stopping your use of alcohol or drugs. When you don't drink or use drugs, you may feel and sleep better. You may  get along better with your family, friends, and coworkers. There are medicines and programs that can help with substance use disorder.  How can you care for yourself at home?  Here are some ways to help you stay sober and prevent relapse.  If you have been given medicine to help keep you sober or reduce your cravings, be sure to take it exactly as prescribed.  Talk to your doctor about programs that can help you stop using drugs or drinking alcohol.  Do not keep alcohol or drugs in your home.  Plan ahead. Think about what you'll say if other people ask you to drink or use drugs. Try not to spend time with people who drink or use drugs.  Use the time and money spent on drinking or drugs to do something that's important to you.  Preventing a relapse  Have a plan to deal with relapse. Learn to recognize changes in your thinking that lead you to drink or use drugs. Get help before you start to drink or use drugs again.  Try to stay away from situations, friends, or places that may lead you to drink or use drugs.  If you feel the need to drink alcohol or use drugs again, seek help right away. Call a trusted friend or family member. Some people get support from organizations such as Narcotics Anonymous or [employer] or from treatment facilities.  If you relapse, get help as soon as you can. Some people make a plan with another person that outlines what they want that person to do for them if they relapse. The plan usually includes how to handle the relapse and who to notify in case of relapse.  Don't give up. Remember that a relapse doesn't mean that you have failed. Use the experience to learn the triggers that lead you to drink or use drugs. Then quit again. Recovery is a lifelong process. Many people have several relapses before they are able to quit for good.  Follow-up care is a key part of your treatment and safety. Be sure to make and go to all appointments, and call your doctor if you are having problems. It's  "also a good idea to know your test results and keep a list of the medicines you take.  When should you call for help?   Call 911  anytime you think you may need emergency care. For example, call if you or someone else:    Has overdosed or has withdrawal signs. Be sure to tell the emergency workers that you are or someone else is using or trying to quit using drugs. Overdose or withdrawal signs may include:  Losing consciousness.  Seizure.  Seeing or hearing things that aren't there (hallucinations).     Is thinking or talking about suicide or harming others.   Where to get help 24 hours a day, 7 days a week   If you or someone you know talks about suicide, self-harm, a mental health crisis, a substance use crisis, or any other kind of emotional distress, get help right away. You can:    Call the Suicide and Crisis Lifeline at 988.     Call 9-148-334-TALK (1-121.350.7886).     Text HOME to 143635 to access the Crisis Text Line.   Consider saving these numbers in your phone.  Go to [employer] for more information or to chat online.  Call your doctor now or seek immediate medical care if:    You are having withdrawal symptoms. These may include nausea or vomiting, sweating, shakiness, and anxiety.   Watch closely for changes in your health, and be sure to contact your doctor if:    You have a relapse.     You need more help or support to stop.   Where can you learn more?  Go to https://www.[employer].net/patiented  Enter H573 in the search box to learn more about \"Substance Use Disorder: Care Instructions.\"  Current as of: November 15, 2023               Content Version: 14.0    1167-8974 [employer].   Care instructions adapted under license by your healthcare professional. If you have questions about a medical condition or this instruction, always ask your healthcare professional. [employer] disclaims any warranty or liability for your use of this information.         "

## 2024-09-20 LAB
HPV HR 12 DNA CVX QL NAA+PROBE: NEGATIVE
HPV16 DNA CVX QL NAA+PROBE: NEGATIVE
HPV18 DNA CVX QL NAA+PROBE: NEGATIVE
HUMAN PAPILLOMA VIRUS FINAL DIAGNOSIS: NORMAL

## 2024-09-25 LAB
BKR AP ASSOCIATED HPV REPORT: NORMAL
BKR LAB AP GYN ADEQUACY: NORMAL
BKR LAB AP GYN INTERPRETATION: NORMAL
BKR LAB AP PREVIOUS ABNORMAL: NORMAL
PATH REPORT.COMMENTS IMP SPEC: NORMAL
PATH REPORT.COMMENTS IMP SPEC: NORMAL
PATH REPORT.RELEVANT HX SPEC: NORMAL

## 2024-10-03 ENCOUNTER — OFFICE VISIT (OUTPATIENT)
Dept: ORTHOPEDICS | Facility: CLINIC | Age: 64
End: 2024-10-03
Payer: COMMERCIAL

## 2024-10-03 ENCOUNTER — ANCILLARY PROCEDURE (OUTPATIENT)
Dept: GENERAL RADIOLOGY | Facility: CLINIC | Age: 64
End: 2024-10-03
Attending: ORTHOPAEDIC SURGERY
Payer: COMMERCIAL

## 2024-10-03 DIAGNOSIS — Z98.1 S/P LUMBAR FUSION: Primary | ICD-10-CM

## 2024-10-03 DIAGNOSIS — M47.27 LUMBOSACRAL SPONDYLOSIS WITH RADICULOPATHY: ICD-10-CM

## 2024-10-03 PROCEDURE — 72082 X-RAY EXAM ENTIRE SPI 2/3 VW: CPT | Performed by: STUDENT IN AN ORGANIZED HEALTH CARE EDUCATION/TRAINING PROGRAM

## 2024-10-03 PROCEDURE — 77073 BONE LENGTH STUDIES: CPT | Performed by: STUDENT IN AN ORGANIZED HEALTH CARE EDUCATION/TRAINING PROGRAM

## 2024-10-03 PROCEDURE — 99024 POSTOP FOLLOW-UP VISIT: CPT | Performed by: ORTHOPAEDIC SURGERY

## 2024-10-03 RX ORDER — GABAPENTIN 100 MG/1
100 CAPSULE ORAL 3 TIMES DAILY
Qty: 90 CAPSULE | Refills: 1 | Status: SHIPPED | OUTPATIENT
Start: 2024-10-03 | End: 2024-12-02

## 2024-10-03 NOTE — LETTER
10/3/2024      Zaira Naranjo  [address] [place] [id number]      Dear Colleague,    Thank you for referring your patient, Zaira Naranjo, to the [facility] [place]. Please see a copy of my visit note below.    In-Person Visit    Spine Surgical Hx:  2000 - Left hemilaminectomy-diskectomy L5-S1 ([place], [place]).  2006 - ACDF with plate fixation C6-7; ant ICBG harvest (Dr. Donnie Salmon, [place], [place]).  08/21/2024 - TLIF-SPO L5-S1; use of local autograft (Sembrano) for spondylosis with Left S1 radiculopathy.  [Implants: [employer] Solera screw system, 5.5 mm titanium rods x2; Capstone Control PTC cages].      Chief Complaint   Patient presents with     RECHECK     POST-OP SPINE - DOS: 8/21/24       S>  64 year old female, 6 wks postop; 1st postop visit.    Patient states her preoperative symptoms have significantly improved.  She does have some discomfort elicited with bearing weight on the left leg or with resisted movements on the left side, starting at the left PSIS and radiating into the gluteal region.  Pain is controlled with 2 500mg Tylenol BID.  Primary concern is left foot pain which originated 9/8/24.  Patient has a history of an isolated gout flare years ago.  She experienced intense pain and inflammation at the 1st MTP joint consistent with her previous flare.  Patient states her PCP prescribed a 5 day course of prednisone.  As the swelling decreased, patient experienced redness and exquisite tenderness which spread laterally over the 2nd-4th metatarsal region and up the lateral aspect of the lower leg to roughly mid-shin.  Following the initial 5 day course of prednisone, patient then completed an additional 10 day course.    While redness has remitted, pain has remained.  There is also a sharp stabbing pain between the 1st and 2nd metatarsal heads.  Pain is exacerbated by even light tough, including putting on a sock or shoe.  It is also worsened with icing.  Patient  notes no effective alleviating factors at this time.  Patient denies any LE numbness or tingling.      Oswestry (TONO) Questionnaire        10/3/2024    11:21 AM   OSWESTRY DISABILITY INDEX   Count 10   Sum 23   Oswestry Score (%) 46 %      S/p TLIF-SPO L5-S1 (8/21/24):  TONO preop 50%  6wk  46%    Neck Disability Index (NDI) Questionnaire        7/30/2024     2:58 PM   Neck Disability Index (NDI)   Neck Disability Index: Count 10   NDI: Total Score = SUM (points for all 10 findings) 24   Neck Disability in Percent = (Total Score) / 50 * 100 48 (%)        Visual Analog Pain Scale  Back Pain Scale 0-10: 6 (low back pain)  Right leg pain: 0  Left leg pain: 5 (pain in back of thigh to knee)  Neck Pain Scale 0-10: 0  Right arm pain: 0  Left arm pain: 0    PROMIS-10 Scores  Global Mental Health Score: (P) 15  Global Physical Health Score: (P) 10  PROMIS TOTAL - SUBSCORES: (P) 25    O>   Alert, oriented x 3, cooperative.  Not in CP distress.  There were no vitals taken for this visit.  Surgical incision(s) well-healed, no sign of infection.  Ambulates independently.  Grossly neurologically intact.    PE:  Exquisite TTP originating between 1st and 2nd MT heads and along L5 distribution  Dermatomes: L1-S1 intact and equal bilaterally, with exception of hypersensitivity in primarily L5 distribution on right leg.  Myotomes: L2-S1 intact, with left lumbar and gluteal discomfort primarily with testing of L2 and L3.  EHL testing 5/5 on left, 4+/5 on right.    Imaging:    EOS Total Body x-ray obtained and interpreted, with results discussed with patient.  Imaging demonstrates appropriate L5-S1 TLIF, with no evidence of failure or migration of hardware.  When compared to pre-operative imaging, fusion demonstrates improved height with cage placement.    A>   64 year-old female 6 weeks s/p L5-S1 TLIF, demonstrating overall clinical improvement and radiographic stability, with hypersensitivity present in L5 distribution of right  foot.    P>    Had good discussion with patient.  Her L foot pain may be a combination of gout flare up and postoperative nerve root inflammation, particularly the L5 nerve root.  At this time, it is best to give it more time, and try to nurse her symptoms along while waiting for the inflammation to settle down.    - Start 100 mg gabapentin 3x daily to improve nerve stabilization in hopes of alleviating some of current right foot pain.  - Referred for initiation of physical therapy.  - Advised she may start taking OTC Ibuprofen.    RTC in 6 wks (3 mos postop) with rpt lumbar AP-lat x-rays.  If pain in L5 distribution has not improved or resolved, we will consider further advanced imaging.    This visit and documentation were initiated by Barb Alvarado, Medical Student.  All statements and findings were reviewed for accuracy and confirmed by me through direct patient evaluation.    Attestation:  I (Dr. Jack Dumont - Spine Surgeon) have personally evaluated patient with MS Alvarado, who acted as scribe for this note.  I agree with findings and plan outlined in the note, which I also edited.  I discussed at length with the patient/family, explained the nature of spinal condition, and formulated workup and/or treatment plan together.  All questions were answered to the best of my ability and to patient's apparent satisfaction.    Jack Dumont MD    Orthopaedic Spine Surgery  Dept Orthopaedic Surgery, [facility]  [phone] office, [phone] pager  [email or web address]        Again, thank you for allowing me to participate in the care of your patient.        Sincerely,        Jack Dumont MD

## 2024-10-03 NOTE — PROGRESS NOTES
In-Person Visit    Spine Surgical Hx:  2000 - Left hemilamine[facility] [place] [place]  2006 - ACDF with plate fixation C6-7; ant ICBG harvest (Dr. Donnie Salmon, [place], [place]).  08/21/2024 - TLIF-SPO L5-S1; use of local autograft (Sembrano) for spondylosis with Left S1 radiculopathy.  [Implants: Medtr[employer] Solera screw system, 5.5 mm titanium rods x2; Capstone Control PTC cages].      Chief Complaint   Patient presents with    RECHECK     POST-OP SPINE - DOS: 8/21/24       S>  64 year old female, 6 wks postop; 1st postop visit.    Patient states her preoperative symptoms have significantly improved.  She does have some discomfort elicited with bearing weight on the left leg or with resisted movements on the left side, starting at the left PSIS and radiating into the gluteal region.  Pain is controlled with 2 500mg Tylenol BID.  Primary concern is left foot pain which originated 9/8/24.  Patient has a history of an isolated gout flare years ago.  She experienced intense pain and inflammation at the 1st MTP joint consistent with her previous flare.  Patient states her PCP prescribed a 5 day course of prednisone.  As the swelling decreased, patient experienced redness and exquisite tenderness which spread laterally over the 2nd-4th metatarsal region and up the lateral aspect of the lower leg to roughly mid-shin.  Following the initial 5 day course of prednisone, patient then completed an additional 10 day course.    While redness has remitted, pain has remained.  There is also a sharp stabbing pain between the 1st and 2nd metatarsal heads.  Pain is exacerbated by even light tough, including putting on a sock or shoe.  It is also worsened with icing.  Patient notes no effective alleviating factors at this time.  Patient denies any LE numbness or tingling.      Oswestry (TONO) Questionnaire        10/3/2024    11:21 AM   OSWESTRY DISABILITY INDEX   Count 10   Sum 23   Oswestry Score (%) 46 %      S/p  TLIF-SPO L5-S1 (8/21/24):  TONO preop 50%  6wk  46%    Neck Disability Index (NDI) Questionnaire        7/30/2024     2:58 PM   Neck Disability Index (NDI)   Neck Disability Index: Count 10   NDI: Total Score = SUM (points for all 10 findings) 24   Neck Disability in Percent = (Total Score) / 50 * 100 48 (%)        Visual Analog Pain Scale  Back Pain Scale 0-10: 6 (low back pain)  Right leg pain: 0  Left leg pain: 5 (pain in back of thigh to knee)  Neck Pain Scale 0-10: 0  Right arm pain: 0  Left arm pain: 0    PROMIS-10 Scores  Global Mental Health Score: (P) 15  Global Physical Health Score: (P) 10  PROMIS TOTAL - SUBSCORES: (P) 25    O>   Alert, oriented x 3, cooperative.  Not in CP distress.  There were no vitals taken for this visit.  Surgical incision(s) well-healed, no sign of infection.  Ambulates independently.  Grossly neurologically intact.    PE:  Exquisite TTP originating between 1st and 2nd MT heads and along L5 distribution  Dermatomes: L1-S1 intact and equal bilaterally, with exception of hypersensitivity in primarily L5 distribution on right leg.  Myotomes: L2-S1 intact, with left lumbar and gluteal discomfort primarily with testing of L2 and L3.  EHL testing 5/5 on left, 4+/5 on right.    Imaging:    EOS Total Body x-ray obtained and interpreted, with results discussed with patient.  Imaging demonstrates appropriate L5-S1 TLIF, with no evidence of failure or migration of hardware.  When compared to pre-operative imaging, fusion demonstrates improved height with cage placement.    A>   64 year-old female 6 weeks s/p L5-S1 TLIF, demonstrating overall clinical improvement and radiographic stability, with hypersensitivity present in L5 distribution of right foot.    P>    Had good discussion with patient.  Her L foot pain may be a combination of gout flare up and postoperative nerve root inflammation, particularly the L5 nerve root.  At this time, it is best to give it more time, and try to nurse her  symptoms along while waiting for the inflammation to settle down.    - Start 100 mg gabapentin 3x daily to improve nerve stabilization in hopes of alleviating some of current right foot pain.  - Referred for initiation of physical therapy.  - Advised she may start taking OTC Ibuprofen.    RTC in 6 wks (3 mos postop) with rpt lumbar AP-lat x-rays.  If pain in L5 distribution has not improved or resolved, we will consider further advanced imaging.    This visit and documentation were initiated by Barb Alvarado, Medical Student.  All statements and findings were reviewed for accuracy and confirmed by me through direct patient evaluation.    Attestation:  I (Dr. Jack Dumont - Spine Surgeon) have personally evaluated patient with MS Alvarado, who acted as scribe for this note.  I agree with findings and plan outlined in the note, which I also edited.  I discussed at length with the patient/family, explained the nature of spinal condition, and formulated workup and/or treatment plan together.  All questions were answered to the best of my ability and to patient's apparent satisfaction.    Jack Dumont MD    Orthopaedic Spine Surgery  Dept Orthopaedic Surgery, [facility]  [phone] office, [phone] pager  [email or web address]

## 2024-10-10 ENCOUNTER — MYC MEDICAL ADVICE (OUTPATIENT)
Dept: FAMILY MEDICINE | Facility: OTHER | Age: 64
End: 2024-10-10

## 2024-10-11 ENCOUNTER — VIRTUAL VISIT (OUTPATIENT)
Dept: FAMILY MEDICINE | Facility: OTHER | Age: 64
End: 2024-10-11
Attending: NURSE PRACTITIONER
Payer: COMMERCIAL

## 2024-10-11 DIAGNOSIS — R22.41 LOCALIZED SWELLING OF RIGHT FOOT: Primary | ICD-10-CM

## 2024-10-11 PROCEDURE — 99441 PR PHYSICIAN TELEPHONE EVALUATION 5-10 MIN: CPT | Mod: 93 | Performed by: NURSE PRACTITIONER

## 2024-10-11 NOTE — PATIENT INSTRUCTIONS
Compression socks on during the day and off at night  Try to move every 30-60 minutes - at least change positions  Elevated legs while sitting if able

## 2024-10-11 NOTE — PROGRESS NOTES
Zaira is a 64 year old who is being evaluated via a billable telephone visit.    What phone number would you like to be contacted at? [phone]  How would you like to obtain your AVS? Kimmie  Originating Location (pt. Location): Home    Distant Location (provider location):  On-site    Assessment & Plan     Localized swelling of right foot  Possible related to her back surgery   Will be starting PT coming up soon.  Discussed self care   Follow up if no improvement    Does not seem to be heart related - not in both legs  Amlodipine she has used for over 5 years.      Follow up with any questions or concerns           See Patient Instructions    No follow-ups on file.    Subjective   Zaira is a 64 year old, presenting for the following health issues:  Edema      10/11/2024     4:20 PM   Additional Questions   Roomed by Sathya Montalvo   Accompanied by None         10/11/2024     4:20 PM   Patient Reported Additional Medications   Patient reports taking the following new medications None     HPI     Concern - Follow up Right foot edema   Onset: Ongoing   Description: Patient states that it is stable right now but not as been has it been in the past   Intensity: moderate  Progression of Symptoms:  waxing and waning  Accompanying Signs & Symptoms: Swelling in the foot   Previous history of similar problem: Yes   Precipitating factors:        Worsened by: Sitting   Alleviating factors:        Improved by: Elevation   Therapies tried and outcome: Elevations           Review of Systems  CONSTITUTIONAL: NEGATIVE for fever, chills, change in weight  RESP: NEGATIVE for significant cough or SOB  CV: NEGATIVE for chest pain, palpitations or peripheral edema  MUSCULOSKELETAL: back pain is improving - some swelling into the right foot   NEURO: swelling to right foot       Objective    Vitals - Patient Reported  Pain Score: No Pain (0)        Physical Exam   General: Alert and no distress //Respiratory: No audible wheeze,  cough, or shortness of breath // Psychiatric:  Appropriate affect, tone, and pace of words      Office Visit on 09/18/2024   Component Date Value Ref Range Status    Human Papilloma Virus 16 DNA 09/18/2024 Negative  Negative Final    Human Papilloma Virus 18 DNA 09/18/2024 Negative  Negative Final    Human Papilloma Virus Other 09/18/2024 Negative  Negative Final    FINAL DIAGNOSIS 09/18/2024    Final                    Value:This result contains rich text formatting which cannot be displayed here.    Cholesterol 09/18/2024 136  <200 mg/dL Final    Triglycerides 09/18/2024 129  <150 mg/dL Final    Direct Measure HDL 09/18/2024 41 (L)  >=50 mg/dL Final    LDL Cholesterol Calculated 09/18/2024 69  <100 mg/dL Final    Non HDL Cholesterol 09/18/2024 95  <130 mg/dL Final    Patient Fasting > 8hrs? 09/18/2024 No   Final    Uric Acid 09/18/2024 4.5  2.4 - 5.7 mg/dL Final    Sodium 09/18/2024 134 (L)  135 - 145 mmol/L Final    Potassium 09/18/2024 3.2 (L)  3.4 - 5.3 mmol/L Final    Carbon Dioxide (CO2) 09/18/2024 23  22 - 29 mmol/L Final    Anion Gap 09/18/2024 17 (H)  7 - 15 mmol/L Final    Urea Nitrogen 09/18/2024 20.8  8.0 - 23.0 mg/dL Final    Creatinine 09/18/2024 0.92  0.51 - 0.95 mg/dL Final    GFR Estimate 09/18/2024 69  >60 mL/min/1.73m2 Final    eGFR calculated using 2021 CKD-EPI equation.    Calcium 09/18/2024 9.7  8.8 - 10.4 mg/dL Final    Reference intervals for this test were updated on 7/16/2024 to reflect our healthy population more accurately. There may be differences in the flagging of prior results with similar values performed with this method. Those prior results can be interpreted in the context of the updated reference intervals.    Chloride 09/18/2024 94 (L)  98 - 107 mmol/L Final    Glucose 09/18/2024 106 (H)  70 - 99 mg/dL Final    Alkaline Phosphatase 09/18/2024 106  40 - 150 U/L Final    AST 09/18/2024 36  0 - 45 U/L Final    ALT 09/18/2024 51 (H)  0 - 50 U/L Final    Protein Total 09/18/2024 8.0   6.4 - 8.3 g/dL Final    Albumin 09/18/2024 4.4  3.5 - 5.2 g/dL Final    Bilirubin Total 09/18/2024 0.4  <=1.2 mg/dL Final    Patient Fasting > 8hrs? 09/18/2024 No   Final    Interpretation 09/18/2024 Negative for Intraepithelial Lesion or Malignancy (NILM)    Final    Comment 09/18/2024    Final                    Value:This result contains rich text formatting which cannot be displayed here.    Specimen Adequacy 09/18/2024 Satisfactory for evaluation, endocerv/transformation zone component absent, atrophy   Final    Clinical Information 09/18/2024    Final                    Value:This result contains rich text formatting which cannot be displayed here.    Previous Abnormal? 09/18/2024    Final                    Value:This result contains rich text formatting which cannot be displayed here.    Performing Labs 09/18/2024    Final                    Value:This result contains rich text formatting which cannot be displayed here.    Associated HPV Report 09/18/2024    Final                    Value:This result contains rich text formatting which cannot be displayed here.         Phone call duration: 6 minutes  Signed Electronically by: TESFAYE Dietz CNP

## 2024-10-18 DIAGNOSIS — M47.27 LUMBOSACRAL SPONDYLOSIS WITH RADICULOPATHY: Primary | ICD-10-CM

## 2024-10-18 DIAGNOSIS — Z98.1 S/P LUMBAR FUSION: ICD-10-CM

## 2024-10-21 DIAGNOSIS — E78.00 PURE HYPERCHOLESTEROLEMIA: ICD-10-CM

## 2024-10-21 DIAGNOSIS — E78.2 MIXED HYPERLIPIDEMIA: ICD-10-CM

## 2024-10-21 RX ORDER — ATORVASTATIN CALCIUM 20 MG/1
20 TABLET, FILM COATED ORAL AT BEDTIME
Qty: 90 TABLET | Refills: 1 | Status: SHIPPED | OUTPATIENT
Start: 2024-10-21

## 2024-11-05 ENCOUNTER — MYC MEDICAL ADVICE (OUTPATIENT)
Dept: ORTHOPEDICS | Facility: CLINIC | Age: 64
End: 2024-11-05
Payer: COMMERCIAL

## 2024-11-05 NOTE — TELEPHONE ENCOUNTER
See My Chart message with pics of feet attached from pt. & see separate same My CHart message pt sent to Primary provider & they replied might be Gout?    I called pt who will F/U with Primary provider about poss Gout workup & also stated can't come to appt 11-15-24 due to work & wants to see  one more time before Insurance changes in Jan & can't come here anymore.  Made appt with .    Call back prn.  Pt agreed.  Lolly Bruce RN.

## 2024-11-14 ENCOUNTER — OFFICE VISIT (OUTPATIENT)
Dept: ORTHOPEDICS | Facility: CLINIC | Age: 64
End: 2024-11-14
Payer: COMMERCIAL

## 2024-11-14 ENCOUNTER — ANCILLARY PROCEDURE (OUTPATIENT)
Dept: GENERAL RADIOLOGY | Facility: CLINIC | Age: 64
End: 2024-11-14
Attending: PHYSICIAN ASSISTANT
Payer: COMMERCIAL

## 2024-11-14 DIAGNOSIS — Z98.1 S/P LUMBAR FUSION: ICD-10-CM

## 2024-11-14 DIAGNOSIS — M54.17 LUMBOSACRAL RADICULOPATHY AT L5: ICD-10-CM

## 2024-11-14 DIAGNOSIS — Z98.1 S/P LUMBAR FUSION: Primary | ICD-10-CM

## 2024-11-14 PROCEDURE — 99024 POSTOP FOLLOW-UP VISIT: CPT | Performed by: ORTHOPAEDIC SURGERY

## 2024-11-14 PROCEDURE — 72100 X-RAY EXAM L-S SPINE 2/3 VWS: CPT | Performed by: STUDENT IN AN ORGANIZED HEALTH CARE EDUCATION/TRAINING PROGRAM

## 2024-11-14 NOTE — PROGRESS NOTES
In-Person Visit    Spine Surgical Hx:  2000 - Left hemilaminectomy-diskectomy L5-S1 ([facility], [place]).  2006 - ACDF with plate fixation C6-7; ant ICBG harvest (Dr. Donnie Salmon, [place], [place]).  08/21/2024 - TLIF-SPO L5-S1; use of local autograft (Sembrano) for spondylosis with Left S1 radiculopathy.  [Implants: [employer]tr[employer] Solera screw system, 5.5 mm titanium rods x2; Capstone Control PTC cages].      Chief Complaint   Patient presents with    RECHECK     DOS: 8/21/24 //Transforaminal lumbar interbody fusion with De La Cruz-Miller osteotomy lumbar 5-sacral 1; Use of Infuse bone morphogenetic protein and allograft.       S>  64 year old female, 3 mos postop; last seen at 6 wks.  Reported improved preop sxs; however, also developed Right leg/foot redness and discomfort.  Stabbing pain between 1st and 2nd MT heads.  P> Start gabapentin 100 mg PO TID; PT.    Unaccompanied.  Preop pain still better.  HOwever, still with R foot pain that was not present preop, but ahd been present since surgery.  Per patient, it moves around.  Earlier this week, it was her 4th toe that was red and swollen.  Then it moved back to the 1st MTP joint area.  When she puts weight on her Left foot, she also experiences some pain over her Left lower back (lumbosacral paraspinal) region.    Ibuprofen.  Gabapentin 100 mg PO at hs.  Before her 6 wk visit, was given a short course of prednisone.    Doing PT for her foot, in [place].    Oswestry (TONO) Questionnaire        10/3/2024    11:21 AM   OSWESTRY DISABILITY INDEX   Count 10   Sum 23   Oswestry Score (%) 46 %      S/p TLIF-SPO L5-S1 (8/21/24):  TONO preop 50%  6wk                    46%  3mo  NR      Neck Disability Index (NDI) Questionnaire        7/30/2024     2:58 PM   Neck Disability Index (NDI)   Neck Disability Index: Count 10   NDI: Total Score = SUM (points for all 10 findings) 24   Neck Disability in Percent = (Total Score) / 50 * 100 48 (%)        Visual Analog Pain Scale  Back Pain  Scale 0-10: 3 (left low back pain)  Right leg pain: 0  Left leg pain: 4 (pain while walking)  Neck Pain Scale 0-10: 3  Right arm pain: 0  Left arm pain: 0      O>   Alert, oriented x 3, cooperative.  Not in CP distress.  There were no vitals taken for this visit.  Surgical incision(s) well-healed, no sign of infection.  Ambulates independently.  She says when she puts weight on her left foot, she feels some discomfort in her left lower back (lumbosacral).  Grossly neurologically intact.    I inspected her feet.  There may be some mild swelling of her right foot and ankle.  However, not that bad.  Also, does not appear that red nor significantly inflamed/infected.  She is also able to actively move her toes and feet well.  Although her active big toe extension on the right is somewhat more limited compared to the left, she has good strength on both sides 5/5.    Neurologic exam:  5/5 motor strength for all muscle groups in both lower extremities.  Reports mild sensory deficit to light touch over right foot dorsal and plantar aspects.    Imaging:    Lumbar AP lateral standing x-rays taken today show stable interbody cages and bilateral pedicle screw fixation L5-S1.  No sign of loosening or failure.  Overall, reassuring x-rays.    A>   1.  3 mos s/p TLIF-SPO L5-S1, with improved preoperative low back pain.  2.  Right foot postsurgical neuropathic pain, rule out complex regional pain syndrome.    P>    Had good discussion with the patient.  Even though her preoperative pain is improved, her ongoing right foot pain, which only came about after surgery, is still very limiting to her.  She is not happy to simply live with ongoing symptoms, and would prefer that we continue to work this up and treated.  It is quite odd, as it does not seem to strictly follow a dermatomal pattern either L5 or S1; the report of redness and swelling also makes me consider possible complex regional pain syndrome.  Of note, she actually has good  strength in all myotomes.  I recommended that we increase her gabapentin as this is a nerve stabilizing medication.  I also would like to get a lumbar CT scan to make sure there is no bony compressive lesion against the nerve roots.  I also offered to send her for an EMG, which may provide some valuable insight as to the nature of her symptoms.  Lastly, if this is complex regional pain syndrome, the pain clinic may also be able to help with treatment.    - may increase gabapentin to 100 mg PO TID.  - Lumbar CT to rule out extrinsic nerve root compression; will call patient re results.  - Bilateral LE EMG.  - Pain clinic referral for postop neuropathic pain, possible CRPS.    RTC in 3 mos (6 mos postop) with rpt lumbar ap-lat standing x-rays.    Jack Dumont MD    Orthopaedic Spine Surgery  Dept Orthopaedic Surgery, [facility]  [phone] office, [phone] pager  [email or web address]

## 2024-11-14 NOTE — LETTER
11/14/2024      Zaira Naranjo  [address] [place] [id number]      Dear Colleague,    Thank you for referring your patient, Zaira Naranjo, to the [facility] [place]. Please see a copy of my visit note below.    In-Person Visit    Spine Surgical Hx:  2000 - Left hemilaminectomy-diskectomy L5-S1 (, [place]).  2006 - ACDF with plate fixation C6-7; ant ICBG harvest (Dr. Donnie Salmon, [place], [place]).  08/21/2024 - TLIF-SPO L5-S1; use of local autograft (Sembrano) for spondylosis with Left S1 radiculopathy.  [Implants: [employer] Solera screw system, 5.5 mm titanium rods x2; [employer] Control PTC cages].      Chief Complaint   Patient presents with     RECHECK     DOS: 8/21/24 //Transforaminal lumbar interbody fusion with De La Cruz-Miller osteotomy lumbar 5-sacral 1; Use of Infuse bone morphogenetic protein and allograft.       S>  64 year old female, 3 mos postop; last seen at 6 wks.  Reported improved preop sxs; however, also developed Right leg/foot redness and discomfort.  Stabbing pain between 1st and 2nd MT heads.  P> Start gabapentin 100 mg PO TID; PT.    Unaccompanied.  Preop pain still better.  HOwever, still with R foot pain that was not present preop, but ahd been present since surgery.  Per patient, it moves around.  Earlier this week, it was her 4th toe that was red and swollen.  Then it moved back to the 1st MTP joint area.  When she puts weight on her Left foot, she also experiences some pain over her Left lower back (lumbosacral paraspinal) region.    Ibuprofen.  Gabapentin 100 mg PO at hs.  Before her 6 wk visit, was given a short course of prednisone.    Doing PT for her foot, in [place].    Oswestry (TONO) Questionnaire        10/3/2024    11:21 AM   OSWESTRY DISABILITY INDEX   Count 10   Sum 23   Oswestry Score (%) 46 %      S/p TLIF-SPO L5-S1 (8/21/24):  TONO preop 50%  6wk                    46%  3mo  NR      Neck Disability Index (NDI) Questionnaire         7/30/2024     2:58 PM   Neck Disability Index (NDI)   Neck Disability Index: Count 10   NDI: Total Score = SUM (points for all 10 findings) 24   Neck Disability in Percent = (Total Score) / 50 * 100 48 (%)        Visual Analog Pain Scale  Back Pain Scale 0-10: 3 (left low back pain)  Right leg pain: 0  Left leg pain: 4 (pain while walking)  Neck Pain Scale 0-10: 3  Right arm pain: 0  Left arm pain: 0      O>   Alert, oriented x 3, cooperative.  Not in CP distress.  There were no vitals taken for this visit.  Surgical incision(s) well-healed, no sign of infection.  Ambulates independently.  She says when she puts weight on her left foot, she feels some discomfort in her left lower back (lumbosacral).  Grossly neurologically intact.    I inspected her feet.  There may be some mild swelling of her right foot and ankle.  However, not that bad.  Also, does not appear that red nor significantly inflamed/infected.  She is also able to actively move her toes and feet well.  Although her active big toe extension on the right is somewhat more limited compared to the left, she has good strength on both sides 5/5.    Neurologic exam:  5/5 motor strength for all muscle groups in both lower extremities.  Reports mild sensory deficit to light touch over right foot dorsal and plantar aspects.    Imaging:    Lumbar AP lateral standing x-rays taken today show stable interbody cages and bilateral pedicle screw fixation L5-S1.  No sign of loosening or failure.  Overall, reassuring x-rays.    A>   1.  3 mos s/p TLIF-SPO L5-S1, with improved preoperative low back pain.  2.  Right foot postsurgical neuropathic pain, rule out complex regional pain syndrome.    P>    Had good discussion with the patient.  Even though her preoperative pain is improved, her ongoing right foot pain, which only came about after surgery, is still very limiting to her.  She is not happy to simply live with ongoing symptoms, and would prefer that we continue to  work this up and treated.  It is quite odd, as it does not seem to strictly follow a dermatomal pattern either L5 or S1; the report of redness and swelling also makes me consider possible complex regional pain syndrome.  Of note, she actually has good strength in all myotomes.  I recommended that we increase her gabapentin as this is a nerve stabilizing medication.  I also would like to get a lumbar CT scan to make sure there is no bony compressive lesion against the nerve roots.  I also offered to send her for an EMG, which may provide some valuable insight as to the nature of her symptoms.  Lastly, if this is complex regional pain syndrome, the pain clinic may also be able to help with treatment.    - may increase gabapentin to 100 mg PO TID.  - Lumbar CT to rule out extrinsic nerve root compression; will call patient re results.  - Bilateral LE EMG.  - Pain clinic referral for postop neuropathic pain, possible CRPS.    RTC in 3 mos (6 mos postop) with rpt lumbar ap-lat standing x-rays.    Jack Dumont MD    Orthopaedic Spine Surgery  Dept Orthopaedic Surgery, [facility]  [phone] office, [phone] pager  [email or web address]        Again, thank you for allowing me to participate in the care of your patient.        Sincerely,        Jack Dumont MD

## 2024-11-26 DIAGNOSIS — H10.13 ALLERGIC CONJUNCTIVITIS, BILATERAL: ICD-10-CM

## 2024-11-26 DIAGNOSIS — I10 HYPERTENSION, UNSPECIFIED TYPE: ICD-10-CM

## 2024-11-26 RX ORDER — FLUTICASONE PROPIONATE 50 MCG
SPRAY, SUSPENSION (ML) NASAL
Qty: 16 G | Refills: 0 | Status: SHIPPED | OUTPATIENT
Start: 2024-11-26

## 2024-11-26 NOTE — TELEPHONE ENCOUNTER
Elavil 25 MG      Last Written Prescription Date:  09/16/24  Last Fill Quantity: 90,   # refills: 0  Last Office Visit: 09/18/24  Future Office visit:       Routing refill request to provider for review/approval because:  Tricyclic Agents Failed    Rerun Protocol (11/26/2024 4:26 PM)    Medicaiton indicated for associated diagnosis    Medication is associated with one or more of the following diagnoses:               Alcoholism              Anxiety              Attention deficit hyperactivity disorder              Depression              Fibromyalgia              Headache                      Insomnia                       Neurogenic bladder              Neuropathy              Nocturnal enuresis              Pain              Panic Disorder              Smoking cessation              Tinnitus

## 2024-12-02 ENCOUNTER — MYC MEDICAL ADVICE (OUTPATIENT)
Dept: ORTHOPEDICS | Facility: CLINIC | Age: 64
End: 2024-12-02
Payer: COMMERCIAL

## 2024-12-03 NOTE — TELEPHONE ENCOUNTER
Called Pt to verify info. Faxed EMG order to Dr Nehemiah Dillon to fax # [phone] as Pt requested.  Moise RANDOLPH

## 2024-12-17 ENCOUNTER — MYC MEDICAL ADVICE (OUTPATIENT)
Dept: FAMILY MEDICINE | Facility: OTHER | Age: 64
End: 2024-12-17

## 2024-12-19 ENCOUNTER — THERAPY VISIT (OUTPATIENT)
Dept: PHYSICAL THERAPY | Facility: HOSPITAL | Age: 64
End: 2024-12-19
Attending: ORTHOPAEDIC SURGERY
Payer: COMMERCIAL

## 2024-12-19 ENCOUNTER — HOSPITAL ENCOUNTER (OUTPATIENT)
Dept: CT IMAGING | Facility: HOSPITAL | Age: 64
Discharge: HOME | End: 2024-12-19
Attending: ORTHOPAEDIC SURGERY
Payer: COMMERCIAL

## 2024-12-19 DIAGNOSIS — M54.17 LUMBOSACRAL RADICULOPATHY AT L5: ICD-10-CM

## 2024-12-19 DIAGNOSIS — Z98.1 S/P LUMBAR FUSION: ICD-10-CM

## 2024-12-19 PROCEDURE — 97110 THERAPEUTIC EXERCISES: CPT | Mod: GP

## 2024-12-19 PROCEDURE — 97161 PT EVAL LOW COMPLEX 20 MIN: CPT | Mod: GP

## 2024-12-19 PROCEDURE — 97530 THERAPEUTIC ACTIVITIES: CPT | Mod: GP

## 2024-12-19 PROCEDURE — 72131 CT LUMBAR SPINE W/O DYE: CPT

## 2024-12-19 NOTE — PROGRESS NOTES
"PHYSICAL THERAPY EVALUATION  Type of Visit: Evaluation       Fall Risk Screen:  Fall screen completed by: PT  Have you fallen 2 or more times in the past year?: No  Have you fallen and had an injury in the past year?: No  Is patient a fall risk?: No    Subjective         Presenting condition or subjective complaint: (Patient-Rptd) recovery from l5s1 surgery  Date of onset: 10/03/24 (Date of referral)    Relevant medical history:     Dates & types of surgery: (Patient-Rptd) August 21 surgery    Prior diagnostic imaging/testing results: (Patient-Rptd) MRI; CT scan; X-ray; Bone scan     Prior therapy history for the same diagnosis, illness or injury: (Patient-Rptd) No          Living Environment  Social support: (Patient-Rptd) With a significant other or spouse   Type of home: (Patient-Rptd) Apartment/condo; 1 level   Stairs to enter the home: (Patient-Rptd) No       Ramp: (Patient-Rptd) No   Stairs inside the home: (Patient-Rptd) No       Help at home: (Patient-Rptd) None  Equipment owned: (Patient-Rptd) Straight Cane; Walker     Employment: (Patient-Rptd) Yes (Patient-Rptd) [employer]   Hobbies/Interests: (Patient-Rptd) swimming outdoors anything    Patient goals for therapy: (Patient-Rptd) walk and sit without pain. Pt reported she wants to get back to doing yoga and wants to be able to walk at least a mile without high pain by March. She is going on a trip to [place] in March.    Pain assessment: Pain present     Objective   LUMBAR SPINE EVALUATION  PAIN: Pt has constant pain and stiffness in left side of back. She reports it feels similar to the pain prior to surgery but more intense.  Some days she isn't able to put any pressure on that area of her back due to the pain. She had swelling and pain in her right foot following surgery, the area of pain moved around. She was diagnosed with gout and symptoms have significantly improved.  The bottom of her foot sometimes still feels like \"it was frozen and " "is thawing out\".  She also reports that her left leg feels weak. She has been taking ibuprofen several times a day and gabapentin at night.  INTEGUMENTARY (edema, incisions): WNL  POSTURE: Sitting Posture: Rounded shoulders, Forward head, Lordosis decreased, Thoracic kyphosis increased  GAIT:   Weightbearing Status: WBAT  Assistive Device(s): None  Gait Deviations: Trendelenberg L, Trendelenberg R  BALANCE/PROPRIOCEPTION: WFL  ROM:   Lumbar AROM as percentage of expected range for age:  Flexion: 80  Extension: 60*  Side Bending Right: 60  Side Bending Left: 60  Rotation Right: 80  Rotation Left: 80  STRENGTH: Diminished bridging and posterior pelvic tilt control with hip hinge and squatting  MYOTOMES: Deferred  DTR S: Deferred  NEURAL TENSION: Lumbar WNL, equivocal SLR (HS versus neural tension)  FLEXIBILITY: WNL  FUNCTIONAL TESTS: Double Leg Squat: Anterior knee translation, Knee valgus, Hip internal rotation, Increased trunk lean, and Improper use of glutes/hips  PALPATION: Markedly hypertonic lumbar paraspinals BL with numerous TrPs  SPINAL SEGMENTAL CONCLUSIONS: Reduced PA mobiliity Mid to low thoracic; Deferred PA and transverse mobs until CT later today    Assessment & Plan   CLINICAL IMPRESSIONS  Medical Diagnosis: S/P lumbar fusion    Treatment Diagnosis: Low back pain   Impression/Assessment: Patient is a 64 year old female with low back pain complaints s/p lumbar fusion surgery with mixed neurogenic presentation with likely postural and mechanical myofascial drivers.  The following significant findings have been identified: Pain, Decreased ROM/flexibility, Decreased joint mobility, Decreased strength, Impaired gait, Impaired muscle performance, Decreased activity tolerance, and Impaired posture. These impairments interfere with their ability to perform self care tasks, work tasks, recreational activities, household chores, driving , household mobility, and community mobility as compared to previous level " of function.     Clinical Decision Making (Complexity):  Clinical Presentation: Stable/Uncomplicated  Clinical Presentation Rationale: based on medical and personal factors listed in PT evaluation  Clinical Decision Making (Complexity): Low complexity    PLAN OF CARE  Treatment Interventions:  Modalities: Cryotherapy, Dry Needling, E-stim, Hot Pack  Interventions: Gait Training, Manual Therapy, Neuromuscular Re-education, Therapeutic Activity, Therapeutic Exercise, Self-Care/Home Management    Long Term Goals     PT Goal 1  Goal Identifier: STG 1  Goal Description: Patient will be independent with a short-term home exercise program.  Target Date: 01/16/25  PT Goal 2  Goal Identifier: STG 2  Goal Description: Patient will understand and demonstrate improved posture and techniques such that patient places less strain over spine and supporting musculature.  Target Date: 01/16/25  PT Goal 3  Goal Identifier: LTG 1  Goal Description: Improve score on utilized outcome measures to correlate with clinically significant change.  Target Date: 03/13/25  PT Goal 4  Goal Identifier: LTG 2  Goal Description: Patient will endorse confidence in ability to manage home exercise program independently to promote continued progression and management of back pain symptoms following discharge from PT services.  Target Date: 03/13/25      Frequency of Treatment: 1x/week tapered to discharge  Duration of Treatment: 12 weeks    Education Assessment:   Learner/Method: Patient;Listening;Reading;Demonstration;Pictures/Video;No Barriers to Learning    Risks and benefits of evaluation/treatment have been explained.   Patient/Family/caregiver agrees with Plan of Care.     Evaluation Time:     PT Eval, Low Complexity Minutes (15686): 20     Signing Clinician: Nahomi Esparza, PT

## (undated) DEVICE — SOL ISOPROPYL RUBBING ALCOHOL USP 70% 4OZ HDX-20 I0020

## (undated) DEVICE — SOL NACL 0.9% IRRIG 1000ML BOTTLE 2F7124

## (undated) DEVICE — DRAPE MAYO STAND 23X54 8337

## (undated) DEVICE — SUTURE VICRYL+ 2-0 CT-2 27" UND VCP269H

## (undated) DEVICE — SU VICRYL 0 CT-1 27" UND J260H

## (undated) DEVICE — DRAIN ROUND W/RESERV KIT JACKSON PRATT 10FR 400ML SU130-402D

## (undated) DEVICE — LINEN TOWEL PACK X30 5481

## (undated) DEVICE — MARKER SPHERES PASSIVE MEDT PACK 5 8801075

## (undated) DEVICE — POSITIONER ARMBOARD FOAM 1PAIR LF FP-ARMB1

## (undated) DEVICE — DRSG TEGADERM 4X4 3/4" 1626W

## (undated) DEVICE — SUTURE VICRYL+ 1 CT-1 CR 8X18" VIO VCP741D

## (undated) DEVICE — SU DERMABOND ADVANCED .7ML DNX12

## (undated) DEVICE — ESU GROUND PAD UNIVERSAL W/O CORD

## (undated) DEVICE — TUBING SUCTION MEDI-VAC SOFT 3/16"X20' N520A

## (undated) DEVICE — KIT PATIENT CARE PROAXIS SYSTEM 6988-PV-ACP

## (undated) DEVICE — GLOVE BIOGEL PI MICRO SZ 8.0 48580

## (undated) DEVICE — DRSG TEGADERM 2 3/8X2 3/4" 1624W

## (undated) DEVICE — RX SURGIFLO HEMOSTATIC MATRIX W/THROMBIN 8ML 2994

## (undated) DEVICE — LINEN BACK PACK 5440

## (undated) DEVICE — SU MONOCRYL 3-0 PS-2 18" UND Y497G

## (undated) DEVICE — NEEDLE SPINAL DISP 18GA X 3.5" QUINCKE 333350

## (undated) DEVICE — SU VICRYL 2-0 CT-1 27" UND J259H

## (undated) DEVICE — PREP CHLORAPREP 26ML TINTED HI-LITE ORANGE 930815

## (undated) DEVICE — SYR 03ML LL W/O NDL 309657

## (undated) DEVICE — BLADE BONE MILL STRK MED 5420-MED-000

## (undated) DEVICE — TOOL DISSECT MIDAS MR8 14CM MATCH HEAD 3MM MR8-14MH30

## (undated) DEVICE — DRSG AQUACEL AG HYDROFIBER 3.5X6" 422604

## (undated) DEVICE — SYR 10ML FINGER CONTROL W/O NDL 309695

## (undated) DEVICE — DRAPE O ARM TUBE 9732722

## (undated) DEVICE — DRAPE LAP W/ARMBOARD 29410

## (undated) DEVICE — SOL WATER IRRIG 1000ML BOTTLE 2F7114

## (undated) DEVICE — Device

## (undated) DEVICE — GLOVE BIOGEL PI MICRO INDICATOR UNDERGLOVE SZ 8.5 48985

## (undated) RX ORDER — DEXAMETHASONE SODIUM PHOSPHATE 10 MG/ML
INJECTION, SOLUTION INTRAMUSCULAR; INTRAVENOUS
Status: DISPENSED
Start: 2024-02-27

## (undated) RX ORDER — MORPHINE SULFATE 2 MG/ML
INJECTION, SOLUTION INTRAMUSCULAR; INTRAVENOUS
Status: DISPENSED
Start: 2024-08-21

## (undated) RX ORDER — APREPITANT 40 MG/1
CAPSULE ORAL
Status: DISPENSED
Start: 2024-08-21

## (undated) RX ORDER — PROPOFOL 10 MG/ML
INJECTION, EMULSION INTRAVENOUS
Status: DISPENSED
Start: 2024-08-21

## (undated) RX ORDER — FENTANYL CITRATE 50 UG/ML
INJECTION, SOLUTION INTRAMUSCULAR; INTRAVENOUS
Status: DISPENSED
Start: 2024-08-21

## (undated) RX ORDER — HYDROMORPHONE HYDROCHLORIDE 1 MG/ML
INJECTION, SOLUTION INTRAMUSCULAR; INTRAVENOUS; SUBCUTANEOUS
Status: DISPENSED
Start: 2024-08-21

## (undated) RX ORDER — ONDANSETRON 2 MG/ML
INJECTION INTRAMUSCULAR; INTRAVENOUS
Status: DISPENSED
Start: 2024-08-21

## (undated) RX ORDER — CEFAZOLIN SODIUM/WATER 2 G/20 ML
SYRINGE (ML) INTRAVENOUS
Status: DISPENSED
Start: 2024-08-21

## (undated) RX ORDER — LIDOCAINE HYDROCHLORIDE 10 MG/ML
INJECTION, SOLUTION EPIDURAL; INFILTRATION; INTRACAUDAL; PERINEURAL
Status: DISPENSED
Start: 2024-02-27

## (undated) RX ORDER — SCOLOPAMINE TRANSDERMAL SYSTEM 1 MG/1
PATCH, EXTENDED RELEASE TRANSDERMAL
Status: DISPENSED
Start: 2024-08-21

## (undated) RX ORDER — ACETAMINOPHEN 325 MG/1
TABLET ORAL
Status: DISPENSED
Start: 2024-08-21

## (undated) RX ORDER — GABAPENTIN 300 MG/1
CAPSULE ORAL
Status: DISPENSED
Start: 2024-08-21

## (undated) RX ORDER — SODIUM CHLORIDE, SODIUM LACTATE, POTASSIUM CHLORIDE, CALCIUM CHLORIDE 600; 310; 30; 20 MG/100ML; MG/100ML; MG/100ML; MG/100ML
INJECTION, SOLUTION INTRAVENOUS
Status: DISPENSED
Start: 2024-08-21

## (undated) RX ORDER — EPHEDRINE SULFATE 50 MG/ML
INJECTION, SOLUTION INTRAMUSCULAR; INTRAVENOUS; SUBCUTANEOUS
Status: DISPENSED
Start: 2024-08-21